# Patient Record
Sex: MALE | Race: BLACK OR AFRICAN AMERICAN | NOT HISPANIC OR LATINO
[De-identification: names, ages, dates, MRNs, and addresses within clinical notes are randomized per-mention and may not be internally consistent; named-entity substitution may affect disease eponyms.]

---

## 2017-06-13 ENCOUNTER — APPOINTMENT (OUTPATIENT)
Dept: VASCULAR SURGERY | Facility: CLINIC | Age: 77
End: 2017-06-13

## 2017-06-13 VITALS
BODY MASS INDEX: 23.35 KG/M2 | WEIGHT: 180 LBS | HEIGHT: 73.5 IN | DIASTOLIC BLOOD PRESSURE: 68 MMHG | SYSTOLIC BLOOD PRESSURE: 106 MMHG | HEART RATE: 77 BPM | TEMPERATURE: 97.7 F

## 2017-06-13 RX ORDER — PNV NO.95/FERROUS FUM/FOLIC AC 28MG-0.8MG
TABLET ORAL
Refills: 0 | Status: ACTIVE | COMMUNITY

## 2017-08-18 ENCOUNTER — APPOINTMENT (OUTPATIENT)
Dept: VASCULAR SURGERY | Facility: CLINIC | Age: 77
End: 2017-08-18
Payer: MEDICARE

## 2017-08-18 VITALS
WEIGHT: 180 LBS | DIASTOLIC BLOOD PRESSURE: 74 MMHG | SYSTOLIC BLOOD PRESSURE: 118 MMHG | BODY MASS INDEX: 23.86 KG/M2 | HEIGHT: 73 IN | TEMPERATURE: 98.3 F | HEART RATE: 80 BPM

## 2017-08-18 PROCEDURE — 99406 BEHAV CHNG SMOKING 3-10 MIN: CPT

## 2017-08-18 PROCEDURE — 99214 OFFICE O/P EST MOD 30 MIN: CPT

## 2018-07-27 ENCOUNTER — APPOINTMENT (OUTPATIENT)
Dept: VASCULAR SURGERY | Facility: CLINIC | Age: 78
End: 2018-07-27
Payer: MEDICARE

## 2018-07-27 VITALS
BODY MASS INDEX: 22.53 KG/M2 | HEART RATE: 87 BPM | HEIGHT: 73 IN | SYSTOLIC BLOOD PRESSURE: 139 MMHG | TEMPERATURE: 97.4 F | DIASTOLIC BLOOD PRESSURE: 75 MMHG | WEIGHT: 170 LBS

## 2018-07-27 DIAGNOSIS — R09.89 OTHER SPECIFIED SYMPTOMS AND SIGNS INVOLVING THE CIRCULATORY AND RESPIRATORY SYSTEMS: ICD-10-CM

## 2018-07-27 PROCEDURE — 93923 UPR/LXTR ART STDY 3+ LVLS: CPT

## 2018-07-27 PROCEDURE — 99406 BEHAV CHNG SMOKING 3-10 MIN: CPT

## 2018-07-27 PROCEDURE — 99214 OFFICE O/P EST MOD 30 MIN: CPT

## 2018-07-27 RX ORDER — COLESEVELAM HYDROCHLORIDE 625 MG/1
625 TABLET, FILM COATED ORAL
Qty: 30 | Refills: 0 | Status: ACTIVE | COMMUNITY
Start: 2017-11-06

## 2019-08-02 ENCOUNTER — APPOINTMENT (OUTPATIENT)
Dept: VASCULAR SURGERY | Facility: CLINIC | Age: 79
End: 2019-08-02

## 2019-10-30 ENCOUNTER — APPOINTMENT (OUTPATIENT)
Dept: SURGERY | Facility: CLINIC | Age: 79
End: 2019-10-30
Payer: MEDICARE

## 2019-10-30 VITALS
OXYGEN SATURATION: 94 % | HEART RATE: 69 BPM | WEIGHT: 164 LBS | DIASTOLIC BLOOD PRESSURE: 84 MMHG | HEIGHT: 72 IN | BODY MASS INDEX: 22.21 KG/M2 | TEMPERATURE: 97.4 F | SYSTOLIC BLOOD PRESSURE: 153 MMHG

## 2019-10-30 DIAGNOSIS — Z87.09 PERSONAL HISTORY OF OTHER DISEASES OF THE RESPIRATORY SYSTEM: ICD-10-CM

## 2019-10-30 DIAGNOSIS — K40.90 UNILATERAL INGUINAL HERNIA, W/OUT OBSTRUCTION OR GANGRENE, NOT SPECIFIED AS RECURRENT: ICD-10-CM

## 2019-10-30 PROCEDURE — 99203 OFFICE O/P NEW LOW 30 MIN: CPT

## 2019-11-21 ENCOUNTER — OUTPATIENT (OUTPATIENT)
Dept: OUTPATIENT SERVICES | Facility: HOSPITAL | Age: 79
LOS: 1 days | Discharge: HOME | End: 2019-11-21
Payer: MEDICARE

## 2019-11-21 VITALS
SYSTOLIC BLOOD PRESSURE: 133 MMHG | WEIGHT: 161.6 LBS | HEIGHT: 72 IN | TEMPERATURE: 98 F | RESPIRATION RATE: 18 BRPM | DIASTOLIC BLOOD PRESSURE: 84 MMHG | OXYGEN SATURATION: 95 % | HEART RATE: 83 BPM

## 2019-11-21 DIAGNOSIS — Z98.890 OTHER SPECIFIED POSTPROCEDURAL STATES: Chronic | ICD-10-CM

## 2019-11-21 DIAGNOSIS — Z98.49 CATARACT EXTRACTION STATUS, UNSPECIFIED EYE: Chronic | ICD-10-CM

## 2019-11-21 DIAGNOSIS — Z29.9 ENCOUNTER FOR PROPHYLACTIC MEASURES, UNSPECIFIED: ICD-10-CM

## 2019-11-21 DIAGNOSIS — Z90.49 ACQUIRED ABSENCE OF OTHER SPECIFIED PARTS OF DIGESTIVE TRACT: Chronic | ICD-10-CM

## 2019-11-21 DIAGNOSIS — Z01.818 ENCOUNTER FOR OTHER PREPROCEDURAL EXAMINATION: ICD-10-CM

## 2019-11-21 DIAGNOSIS — K40.90 UNILATERAL INGUINAL HERNIA, WITHOUT OBSTRUCTION OR GANGRENE, NOT SPECIFIED AS RECURRENT: ICD-10-CM

## 2019-11-21 LAB
ANION GAP SERPL CALC-SCNC: 6 MMOL/L — SIGNIFICANT CHANGE UP (ref 5–17)
APTT BLD: 31.7 SEC — SIGNIFICANT CHANGE UP (ref 27.5–36.3)
BUN SERPL-MCNC: 17 MG/DL — SIGNIFICANT CHANGE UP (ref 7–23)
CALCIUM SERPL-MCNC: 9.7 MG/DL — SIGNIFICANT CHANGE UP (ref 8.5–10.1)
CHLORIDE SERPL-SCNC: 106 MMOL/L — SIGNIFICANT CHANGE UP (ref 96–108)
CO2 SERPL-SCNC: 33 MMOL/L — HIGH (ref 22–31)
CREAT SERPL-MCNC: 1.16 MG/DL — SIGNIFICANT CHANGE UP (ref 0.5–1.3)
GLUCOSE SERPL-MCNC: 70 MG/DL — SIGNIFICANT CHANGE UP (ref 70–99)
HCT VFR BLD CALC: 49.9 % — SIGNIFICANT CHANGE UP (ref 39–50)
HGB BLD-MCNC: 15.4 G/DL — SIGNIFICANT CHANGE UP (ref 13–17)
INR BLD: 1.06 RATIO — SIGNIFICANT CHANGE UP (ref 0.88–1.16)
MCHC RBC-ENTMCNC: 30.6 PG — SIGNIFICANT CHANGE UP (ref 27–34)
MCHC RBC-ENTMCNC: 30.9 GM/DL — LOW (ref 32–36)
MCV RBC AUTO: 99 FL — SIGNIFICANT CHANGE UP (ref 80–100)
NRBC # BLD: 0 /100 WBCS — SIGNIFICANT CHANGE UP (ref 0–0)
PLATELET # BLD AUTO: 215 K/UL — SIGNIFICANT CHANGE UP (ref 150–400)
POTASSIUM SERPL-MCNC: 4.3 MMOL/L — SIGNIFICANT CHANGE UP (ref 3.5–5.3)
POTASSIUM SERPL-SCNC: 4.3 MMOL/L — SIGNIFICANT CHANGE UP (ref 3.5–5.3)
PROTHROM AB SERPL-ACNC: 11.9 SEC — SIGNIFICANT CHANGE UP (ref 10–12.9)
RBC # BLD: 5.04 M/UL — SIGNIFICANT CHANGE UP (ref 4.2–5.8)
RBC # FLD: 15.5 % — HIGH (ref 10.3–14.5)
SODIUM SERPL-SCNC: 145 MMOL/L — SIGNIFICANT CHANGE UP (ref 135–145)
WBC # BLD: 5.35 K/UL — SIGNIFICANT CHANGE UP (ref 3.8–10.5)
WBC # FLD AUTO: 5.35 K/UL — SIGNIFICANT CHANGE UP (ref 3.8–10.5)

## 2019-11-21 PROCEDURE — 93010 ELECTROCARDIOGRAM REPORT: CPT

## 2019-11-21 NOTE — H&P PST ADULT - NSICDXPASTSURGICALHX_GEN_ALL_CORE_FT
PAST SURGICAL HISTORY:  S/P cataract surgery right  eye  9/2019    S/P laparoscopic cholecystectomy     S/P left inguinal hernia repair 1979

## 2019-11-21 NOTE — H&P PST ADULT - NSANTHOSAYNRD_GEN_A_CORE
No. SANTIAGO screening performed.  STOP BANG Legend: 0-2 = LOW Risk; 3-4 = INTERMEDIATE Risk; 5-8 = HIGH Risk

## 2019-11-21 NOTE — H&P PST ADULT - HISTORY OF PRESENT ILLNESS
This is a 79 y/o male c/o right groin swollen, seen PCP for evaluation revealed + right inguinal hernia, he presents today for right inguinal hernia with mesh This is a 79 y/o male c/o right groin swollen, seen PCP for evaluation revealed + right inguinal hernia, he presents today for right inguinal hernia repair  with mesh

## 2019-11-21 NOTE — H&P PST ADULT - NSICDXPROBLEM_GEN_ALL_CORE_FT
PROBLEM DIAGNOSES  Problem: Unilateral inguinal hernia without obstruction or gangrene  Assessment and Plan: right inguinal hernia repair with mesh    Problem: Need for prophylactic measure  Assessment and Plan: The Caprini score indicates this patient is at risk for a VTE event (score 3-5).  Most surgical patients in this group would benefit from pharmacologic prophylaxis.  The surgical team will determine the balance between VTE risk and bleeding risk

## 2019-11-21 NOTE — H&P PST ADULT - NSICDXPASTMEDICALHX_GEN_ALL_CORE_FT
PAST MEDICAL HISTORY:  Emphysema of lung     Hypertension on no med, monitored by MD    Nodule of left lung monitored by MD , had CT and biopsy 7/2019  inconclusived, will have next CT 1/2020 PAST MEDICAL HISTORY:  Emphysema of lung     Hypertension on no med, monitored by MD    Nodule of left lung monitored by MD , had CT and biopsy 7/2019  inconclusive,  will have next CT 1/2020    Vitamin D deficiency

## 2019-11-21 NOTE — H&P PST ADULT - ASSESSMENT
unilateral inguinal hernia , without obstruction or gangrene, not specified as recurrent  CAPRINI VTE 2.0 SCORE [CLOT updated 2019]    AGE RELATED RISK FACTORS                                                       MOBILITY RELATED FACTORS  [ ] Age 41-60 years                                            (1 Point)                    [ ] Bed rest                                                        (1 Point)  [ ] Age: 61-74 years                                           (2 Points)                  [ ] Plaster cast                                                   (2 Points)  [3 ] Age= 75 years                                              (3 Points)                    [ ] Bed bound for more than 72 hours                 (2 Points)    DISEASE RELATED RISK FACTORS                                               GENDER SPECIFIC FACTORS  [ ] Edema in the lower extremities                       (1 Point)              [ ] Pregnancy                                                     (1 Point)  [ ] Varicose veins                                               (1 Point)                     [ ] Post-partum < 6 weeks                                   (1 Point)             [ ] BMI > 25 Kg/m2                                            (1 Point)                     [ ] Hormonal therapy  or oral contraception          (1 Point)                 [ ] Sepsis (in the previous month)                        (1 Point)               [ ] History of pregnancy complications                 (1 point)  [ ] Pneumonia or serious lung disease                                               [ ] Unexplained or recurrent                     (1 Point)           (in the previous month)                               (1 Point)  [ ] Abnormal pulmonary function test                     (1 Point)                 SURGERY RELATED RISK FACTORS  [ ] Acute myocardial infarction                              (1 Point)               [ ]  Section                                             (1 Point)  [ ] Congestive heart failure (in the previous month)  (1 Point)      [ ] Minor surgery                                                  (1 Point)   [ ] Inflammatory bowel disease                             (1 Point)               [ ] Arthroscopic surgery                                        (2 Points)  [ ] Central venous access                                      (2 Points)                [x ] General surgery lasting more than 45 minutes (2 points)  [ ] Malignancy- Present or previous                   (2 Points)                [ ] Elective arthroplasty                                         (5 points)    [ ] Stroke (in the previous month)                          (5 Points)                                                                                                                                                           HEMATOLOGY RELATED FACTORS                                                 TRAUMA RELATED RISK FACTORS  [ ] Prior episodes of VTE                                     (3 Points)                [ ] Fracture of the hip, pelvis, or leg                       (5 Points)  [ ] Positive family history for VTE                         (3 Points)             [ ] Acute spinal cord injury (in the previous month)  (5 Points)  [ ] Prothrombin 20345 A                                     (3 Points)               [ ] Paralysis  (less than 1 month)                             (5 Points)  [ ] Factor V Leiden                                             (3 Points)                  [ ] Multiple Trauma within 1 month                        (5 Points)  [ ] Lupus anticoagulants                                     (3 Points)                                                           [ ] Anticardiolipin antibodies                               (3 Points)                                                       [ ] High homocysteine in the blood                      (3 Points)                                             [ ] Other congenital or acquired thrombophilia      (3 Points)                                                [ ] Heparin induced thrombocytopenia                  (3 Points)                                     Total Score [      5    ]

## 2019-11-27 PROBLEM — E55.9 VITAMIN D DEFICIENCY, UNSPECIFIED: Chronic | Status: ACTIVE | Noted: 2019-11-21

## 2019-12-02 ENCOUNTER — TRANSCRIPTION ENCOUNTER (OUTPATIENT)
Age: 79
End: 2019-12-02

## 2019-12-03 ENCOUNTER — OUTPATIENT (OUTPATIENT)
Dept: OUTPATIENT SERVICES | Facility: HOSPITAL | Age: 79
LOS: 1 days | Discharge: HOME | End: 2019-12-03
Payer: MEDICARE

## 2019-12-03 ENCOUNTER — APPOINTMENT (OUTPATIENT)
Dept: SURGERY | Facility: HOSPITAL | Age: 79
End: 2019-12-03

## 2019-12-03 VITALS
DIASTOLIC BLOOD PRESSURE: 87 MMHG | HEIGHT: 72 IN | HEART RATE: 63 BPM | SYSTOLIC BLOOD PRESSURE: 147 MMHG | RESPIRATION RATE: 18 BRPM | OXYGEN SATURATION: 98 % | WEIGHT: 162.04 LBS | TEMPERATURE: 97 F

## 2019-12-03 VITALS
OXYGEN SATURATION: 96 % | TEMPERATURE: 97 F | RESPIRATION RATE: 918 BRPM | HEART RATE: 61 BPM | DIASTOLIC BLOOD PRESSURE: 73 MMHG | SYSTOLIC BLOOD PRESSURE: 163 MMHG

## 2019-12-03 DIAGNOSIS — Z98.49 CATARACT EXTRACTION STATUS, UNSPECIFIED EYE: Chronic | ICD-10-CM

## 2019-12-03 DIAGNOSIS — Z98.890 OTHER SPECIFIED POSTPROCEDURAL STATES: Chronic | ICD-10-CM

## 2019-12-03 DIAGNOSIS — Z90.49 ACQUIRED ABSENCE OF OTHER SPECIFIED PARTS OF DIGESTIVE TRACT: Chronic | ICD-10-CM

## 2019-12-03 PROCEDURE — 49505 PRP I/HERN INIT REDUC >5 YR: CPT

## 2019-12-03 PROCEDURE — 49505 PRP I/HERN INIT REDUC >5 YR: CPT | Mod: AS

## 2019-12-03 RX ORDER — HYDROMORPHONE HYDROCHLORIDE 2 MG/ML
0.25 INJECTION INTRAMUSCULAR; INTRAVENOUS; SUBCUTANEOUS
Refills: 0 | Status: DISCONTINUED | OUTPATIENT
Start: 2019-12-03 | End: 2019-12-03

## 2019-12-03 RX ORDER — ONDANSETRON 8 MG/1
4 TABLET, FILM COATED ORAL ONCE
Refills: 0 | Status: DISCONTINUED | OUTPATIENT
Start: 2019-12-03 | End: 2019-12-03

## 2019-12-03 RX ORDER — SODIUM CHLORIDE 9 MG/ML
1000 INJECTION, SOLUTION INTRAVENOUS
Refills: 0 | Status: DISCONTINUED | OUTPATIENT
Start: 2019-12-03 | End: 2019-12-03

## 2019-12-03 RX ORDER — ACETAMINOPHEN 500 MG
1000 TABLET ORAL ONCE
Refills: 0 | Status: COMPLETED | OUTPATIENT
Start: 2019-12-03 | End: 2019-12-03

## 2019-12-03 RX ORDER — HYDROMORPHONE HYDROCHLORIDE 2 MG/ML
0.5 INJECTION INTRAMUSCULAR; INTRAVENOUS; SUBCUTANEOUS
Refills: 0 | Status: DISCONTINUED | OUTPATIENT
Start: 2019-12-03 | End: 2019-12-03

## 2019-12-03 RX ADMIN — Medication 1000 MILLIGRAM(S): at 14:33

## 2019-12-03 RX ADMIN — Medication 400 MILLIGRAM(S): at 14:33

## 2019-12-03 RX ADMIN — SODIUM CHLORIDE 100 MILLILITER(S): 9 INJECTION, SOLUTION INTRAVENOUS at 14:33

## 2019-12-03 NOTE — ASU PATIENT PROFILE, ADULT - PSH
S/P cataract surgery  right  eye  9/2019  S/P laparoscopic cholecystectomy    S/P left inguinal hernia repair  1979

## 2019-12-03 NOTE — ASU PATIENT PROFILE, ADULT - PMH
Emphysema of lung    Hypertension  on no med, monitored by MD  Nodule of left lung  monitored by MD , had CT and biopsy 7/2019  inconclusive,  will have next CT 1/2020  Vitamin D deficiency

## 2019-12-03 NOTE — ASU DISCHARGE PLAN (ADULT/PEDIATRIC) - CALL YOUR DOCTOR IF YOU HAVE ANY OF THE FOLLOWING:
Wound/Surgical Site with redness, or foul smelling discharge or pus/Fever greater than (need to indicate Fahrenheit or Celsius)/Bleeding that does not stop/Nausea and vomiting that does not stop/Swelling that gets worse/Unable to urinate

## 2019-12-05 DIAGNOSIS — D17.6 BENIGN LIPOMATOUS NEOPLASM OF SPERMATIC CORD: ICD-10-CM

## 2019-12-05 DIAGNOSIS — Z72.0 TOBACCO USE: ICD-10-CM

## 2019-12-05 DIAGNOSIS — K40.90 UNILATERAL INGUINAL HERNIA, WITHOUT OBSTRUCTION OR GANGRENE, NOT SPECIFIED AS RECURRENT: ICD-10-CM

## 2019-12-05 DIAGNOSIS — J43.9 EMPHYSEMA, UNSPECIFIED: ICD-10-CM

## 2019-12-05 DIAGNOSIS — Z88.0 ALLERGY STATUS TO PENICILLIN: ICD-10-CM

## 2019-12-11 ENCOUNTER — APPOINTMENT (OUTPATIENT)
Dept: SURGERY | Facility: CLINIC | Age: 79
End: 2019-12-11
Payer: MEDICARE

## 2019-12-11 PROCEDURE — 99024 POSTOP FOLLOW-UP VISIT: CPT

## 2020-01-08 ENCOUNTER — APPOINTMENT (OUTPATIENT)
Dept: SURGERY | Facility: CLINIC | Age: 80
End: 2020-01-08
Payer: MEDICARE

## 2020-01-08 VITALS
HEIGHT: 72 IN | HEART RATE: 77 BPM | DIASTOLIC BLOOD PRESSURE: 70 MMHG | SYSTOLIC BLOOD PRESSURE: 137 MMHG | TEMPERATURE: 97.4 F | OXYGEN SATURATION: 90 % | BODY MASS INDEX: 22.25 KG/M2 | WEIGHT: 164.25 LBS

## 2020-01-08 DIAGNOSIS — Z09 ENCOUNTER FOR FOLLOW-UP EXAMINATION AFTER COMPLETED TREATMENT FOR CONDITIONS OTHER THAN MALIGNANT NEOPLASM: ICD-10-CM

## 2020-01-08 PROCEDURE — 99024 POSTOP FOLLOW-UP VISIT: CPT | Mod: PD

## 2020-01-10 ENCOUNTER — INPATIENT (INPATIENT)
Facility: HOSPITAL | Age: 80
LOS: 4 days | Discharge: HOME | End: 2020-01-15
Attending: INTERNAL MEDICINE | Admitting: INTERNAL MEDICINE
Payer: MEDICARE

## 2020-01-10 VITALS
DIASTOLIC BLOOD PRESSURE: 75 MMHG | HEART RATE: 93 BPM | SYSTOLIC BLOOD PRESSURE: 142 MMHG | TEMPERATURE: 99 F | RESPIRATION RATE: 16 BRPM | OXYGEN SATURATION: 91 %

## 2020-01-10 DIAGNOSIS — J93.9 PNEUMOTHORAX, UNSPECIFIED: ICD-10-CM

## 2020-01-10 DIAGNOSIS — Z98.49 CATARACT EXTRACTION STATUS, UNSPECIFIED EYE: Chronic | ICD-10-CM

## 2020-01-10 DIAGNOSIS — Z90.49 ACQUIRED ABSENCE OF OTHER SPECIFIED PARTS OF DIGESTIVE TRACT: Chronic | ICD-10-CM

## 2020-01-10 DIAGNOSIS — Z98.890 OTHER SPECIFIED POSTPROCEDURAL STATES: Chronic | ICD-10-CM

## 2020-01-10 LAB
ALBUMIN SERPL ELPH-MCNC: 4.4 G/DL — SIGNIFICANT CHANGE UP (ref 3.3–5)
ALP SERPL-CCNC: 62 U/L — SIGNIFICANT CHANGE UP (ref 40–120)
ALT FLD-CCNC: 9 U/L — SIGNIFICANT CHANGE UP (ref 4–41)
ANION GAP SERPL CALC-SCNC: 15 MMO/L — HIGH (ref 7–14)
APTT BLD: 35.9 SEC — SIGNIFICANT CHANGE UP (ref 27.5–36.3)
AST SERPL-CCNC: 21 U/L — SIGNIFICANT CHANGE UP (ref 4–40)
BASOPHILS # BLD AUTO: 0.03 K/UL — SIGNIFICANT CHANGE UP (ref 0–0.2)
BASOPHILS NFR BLD AUTO: 0.5 % — SIGNIFICANT CHANGE UP (ref 0–2)
BILIRUB SERPL-MCNC: 0.6 MG/DL — SIGNIFICANT CHANGE UP (ref 0.2–1.2)
BUN SERPL-MCNC: 21 MG/DL — SIGNIFICANT CHANGE UP (ref 7–23)
CALCIUM SERPL-MCNC: 9.8 MG/DL — SIGNIFICANT CHANGE UP (ref 8.4–10.5)
CHLORIDE SERPL-SCNC: 103 MMOL/L — SIGNIFICANT CHANGE UP (ref 98–107)
CO2 SERPL-SCNC: 27 MMOL/L — SIGNIFICANT CHANGE UP (ref 22–31)
CREAT SERPL-MCNC: 1.08 MG/DL — SIGNIFICANT CHANGE UP (ref 0.5–1.3)
EOSINOPHIL # BLD AUTO: 0.08 K/UL — SIGNIFICANT CHANGE UP (ref 0–0.5)
EOSINOPHIL NFR BLD AUTO: 1.3 % — SIGNIFICANT CHANGE UP (ref 0–6)
GLUCOSE SERPL-MCNC: 90 MG/DL — SIGNIFICANT CHANGE UP (ref 70–99)
HCT VFR BLD CALC: 48.7 % — SIGNIFICANT CHANGE UP (ref 39–50)
HGB BLD-MCNC: 14.7 G/DL — SIGNIFICANT CHANGE UP (ref 13–17)
IMM GRANULOCYTES NFR BLD AUTO: 0.2 % — SIGNIFICANT CHANGE UP (ref 0–1.5)
INR BLD: 1.11 — SIGNIFICANT CHANGE UP (ref 0.88–1.17)
LYMPHOCYTES # BLD AUTO: 1.55 K/UL — SIGNIFICANT CHANGE UP (ref 1–3.3)
LYMPHOCYTES # BLD AUTO: 25.9 % — SIGNIFICANT CHANGE UP (ref 13–44)
MAGNESIUM SERPL-MCNC: 2.2 MG/DL — SIGNIFICANT CHANGE UP (ref 1.6–2.6)
MCHC RBC-ENTMCNC: 30.2 % — LOW (ref 32–36)
MCHC RBC-ENTMCNC: 30.6 PG — SIGNIFICANT CHANGE UP (ref 27–34)
MCV RBC AUTO: 101.5 FL — HIGH (ref 80–100)
MONOCYTES # BLD AUTO: 0.83 K/UL — SIGNIFICANT CHANGE UP (ref 0–0.9)
MONOCYTES NFR BLD AUTO: 13.9 % — SIGNIFICANT CHANGE UP (ref 2–14)
NEUTROPHILS # BLD AUTO: 3.49 K/UL — SIGNIFICANT CHANGE UP (ref 1.8–7.4)
NEUTROPHILS NFR BLD AUTO: 58.2 % — SIGNIFICANT CHANGE UP (ref 43–77)
NRBC # FLD: 0 K/UL — SIGNIFICANT CHANGE UP (ref 0–0)
PHOSPHATE SERPL-MCNC: 3 MG/DL — SIGNIFICANT CHANGE UP (ref 2.5–4.5)
PLATELET # BLD AUTO: 199 K/UL — SIGNIFICANT CHANGE UP (ref 150–400)
PMV BLD: 11.6 FL — SIGNIFICANT CHANGE UP (ref 7–13)
POTASSIUM SERPL-MCNC: 4.3 MMOL/L — SIGNIFICANT CHANGE UP (ref 3.5–5.3)
POTASSIUM SERPL-SCNC: 4.3 MMOL/L — SIGNIFICANT CHANGE UP (ref 3.5–5.3)
PROT SERPL-MCNC: 8.7 G/DL — HIGH (ref 6–8.3)
PROTHROM AB SERPL-ACNC: 12.7 SEC — SIGNIFICANT CHANGE UP (ref 9.8–13.1)
RBC # BLD: 4.8 M/UL — SIGNIFICANT CHANGE UP (ref 4.2–5.8)
RBC # FLD: 16 % — HIGH (ref 10.3–14.5)
SODIUM SERPL-SCNC: 145 MMOL/L — SIGNIFICANT CHANGE UP (ref 135–145)
WBC # BLD: 5.99 K/UL — SIGNIFICANT CHANGE UP (ref 3.8–10.5)
WBC # FLD AUTO: 5.99 K/UL — SIGNIFICANT CHANGE UP (ref 3.8–10.5)

## 2020-01-10 PROCEDURE — 71046 X-RAY EXAM CHEST 2 VIEWS: CPT | Mod: 26

## 2020-01-10 RX ORDER — INFLUENZA VIRUS VACCINE 15; 15; 15; 15 UG/.5ML; UG/.5ML; UG/.5ML; UG/.5ML
0.5 SUSPENSION INTRAMUSCULAR ONCE
Refills: 0 | Status: DISCONTINUED | OUTPATIENT
Start: 2020-01-10 | End: 2020-01-11

## 2020-01-10 NOTE — ED ADULT NURSE NOTE - CHIEF COMPLAINT QUOTE
A&OX3 c/o for known left sided pneumothorax, pt had a ct scan yesterday as a follow up for emphysema, reports sob, respirations equal and non labored sating 99% denies n/v cp chills

## 2020-01-10 NOTE — ED PROVIDER NOTE - OBJECTIVE STATEMENT
78yo m pmh COPD, HTN, left lung nodule p/w pneumothorax on outpt CT. PT reports some chronic shortness of breath which is slightly worse than usual. 78yo m pmh COPD, HTN, left lung nodule p/w pneumothorax on outpt CT. PT reports some chronic shortness of breath which is slightly worse than usual. Pt w/ chonic cough. no fevers or chills. Pt follows w/ Ash and was getting CT as part of lung nodule screening

## 2020-01-10 NOTE — ED PROVIDER NOTE - CLINICAL SUMMARY MEDICAL DECISION MAKING FREE TEXT BOX
pt with pneumo CT , stable vitals, saturating 94 on RA, will consult pulm for pigtail cath, reassess.

## 2020-01-10 NOTE — ED ADULT NURSE NOTE - OBJECTIVE STATEMENT
received pt in bed A and OX 3 in NAD resting comfortably, breathing is even and unlabored, pt communicates with full and complete sentences, lung sounds clear B/l but diminished in left lower lobes.  pt is on monitor with NSR noted VS as documented, O2 NC at 2 LPM tolerating well. IV initiated labs sent, reports given to the floor for further care.

## 2020-01-10 NOTE — ED PROVIDER NOTE - NS ED ROS FT
Gen: No fever, normal appetite  Eyes: No eye irritation or discharge  ENT: No ear pain, congestion, sore throat  Resp: cough, SOB, known pneumo   Cardiovascular: No chest pain or palpitation  Gastroenteric: No nausea/vomiting, diarrhea, constipation  :  No change in urine output; no dysuria  MS: No joint or muscle pain  Skin: No rashes  Neuro: No headache; no abnormal movements  Remainder negative, except as per the HPI

## 2020-01-10 NOTE — ED PROVIDER NOTE - PHYSICAL EXAMINATION
GEN - NAD; well appearing; A+O x3   HEAD - NC/AT     EYES - EOMI, no conjunctival pallor, no scleral icterus  ENT -   mucous membranes  moist , no discharge      NECK - Neck supple  PULM - absent breath sounds on right upper lobe.   COR -  RRR, S1 S2, no murmurs  ABD - , ND, NT, soft, no guarding, no rebound, no masses    BACK - no CVA tenderness, nontender spine     EXTREMS - no edema, no deformity, warm and well perfused    SKIN - no rash or bruising      NEUROLOGIC - alert, sensation nl, motor 5/5 RUE/LUE/RLE/LLE

## 2020-01-10 NOTE — ED ADULT NURSE NOTE - NSIMPLEMENTINTERV_GEN_ALL_ED
Implemented All Fall with Harm Risk Interventions:  [place] to call system. Call bell, personal items and telephone within reach. Instruct patient to call for assistance. Room bathroom lighting operational. Non-slip footwear when patient is off stretcher. Physically safe environment: no spills, clutter or unnecessary equipment. Stretcher in lowest position, wheels locked, appropriate side rails in place. Provide visual cue, wrist band, yellow gown, etc. Monitor gait and stability. Monitor for mental status changes and reorient to person, place, and time. Review medications for side effects contributing to fall risk. Reinforce activity limits and safety measures with patient and family. Provide visual clues: red socks.

## 2020-01-10 NOTE — ED ADULT TRIAGE NOTE - CHIEF COMPLAINT QUOTE
A&OX3 c/o for known left sided pneumothorax, pt had a ct scan yesterday as a follow up for emphysema, reports sob, denies n/v cp chills A&OX3 c/o for known left sided pneumothorax, pt had a ct scan yesterday as a follow up for emphysema, reports sob, respirations equal and non labored sating 99% denies n/v cp chills

## 2020-01-11 DIAGNOSIS — J43.9 EMPHYSEMA, UNSPECIFIED: ICD-10-CM

## 2020-01-11 DIAGNOSIS — Z00.00 ENCOUNTER FOR GENERAL ADULT MEDICAL EXAMINATION WITHOUT ABNORMAL FINDINGS: ICD-10-CM

## 2020-01-11 DIAGNOSIS — E53.8 DEFICIENCY OF OTHER SPECIFIED B GROUP VITAMINS: ICD-10-CM

## 2020-01-11 DIAGNOSIS — Z02.9 ENCOUNTER FOR ADMINISTRATIVE EXAMINATIONS, UNSPECIFIED: ICD-10-CM

## 2020-01-11 DIAGNOSIS — I10 ESSENTIAL (PRIMARY) HYPERTENSION: ICD-10-CM

## 2020-01-11 DIAGNOSIS — E13.9 OTHER SPECIFIED DIABETES MELLITUS WITHOUT COMPLICATIONS: ICD-10-CM

## 2020-01-11 DIAGNOSIS — E55.9 VITAMIN D DEFICIENCY, UNSPECIFIED: ICD-10-CM

## 2020-01-11 DIAGNOSIS — R91.1 SOLITARY PULMONARY NODULE: ICD-10-CM

## 2020-01-11 DIAGNOSIS — J93.9 PNEUMOTHORAX, UNSPECIFIED: ICD-10-CM

## 2020-01-11 DIAGNOSIS — N32.89 OTHER SPECIFIED DISORDERS OF BLADDER: ICD-10-CM

## 2020-01-11 DIAGNOSIS — Z29.9 ENCOUNTER FOR PROPHYLACTIC MEASURES, UNSPECIFIED: ICD-10-CM

## 2020-01-11 DIAGNOSIS — F17.200 NICOTINE DEPENDENCE, UNSPECIFIED, UNCOMPLICATED: ICD-10-CM

## 2020-01-11 LAB
ANION GAP SERPL CALC-SCNC: 11 MMO/L — SIGNIFICANT CHANGE UP (ref 7–14)
BASOPHILS # BLD AUTO: 0.03 K/UL — SIGNIFICANT CHANGE UP (ref 0–0.2)
BASOPHILS NFR BLD AUTO: 0.6 % — SIGNIFICANT CHANGE UP (ref 0–2)
BUN SERPL-MCNC: 16 MG/DL — SIGNIFICANT CHANGE UP (ref 7–23)
CALCIUM SERPL-MCNC: 8.8 MG/DL — SIGNIFICANT CHANGE UP (ref 8.4–10.5)
CHLORIDE SERPL-SCNC: 105 MMOL/L — SIGNIFICANT CHANGE UP (ref 98–107)
CO2 SERPL-SCNC: 24 MMOL/L — SIGNIFICANT CHANGE UP (ref 22–31)
CREAT SERPL-MCNC: 0.84 MG/DL — SIGNIFICANT CHANGE UP (ref 0.5–1.3)
EOSINOPHIL # BLD AUTO: 0.15 K/UL — SIGNIFICANT CHANGE UP (ref 0–0.5)
EOSINOPHIL NFR BLD AUTO: 2.9 % — SIGNIFICANT CHANGE UP (ref 0–6)
GLUCOSE BLDC GLUCOMTR-MCNC: 122 MG/DL — HIGH (ref 70–99)
GLUCOSE BLDC GLUCOMTR-MCNC: 82 MG/DL — SIGNIFICANT CHANGE UP (ref 70–99)
GLUCOSE SERPL-MCNC: 66 MG/DL — LOW (ref 70–99)
HCT VFR BLD CALC: 45.7 % — SIGNIFICANT CHANGE UP (ref 39–50)
HGB BLD-MCNC: 14.1 G/DL — SIGNIFICANT CHANGE UP (ref 13–17)
IMM GRANULOCYTES NFR BLD AUTO: 0.2 % — SIGNIFICANT CHANGE UP (ref 0–1.5)
LYMPHOCYTES # BLD AUTO: 1.82 K/UL — SIGNIFICANT CHANGE UP (ref 1–3.3)
LYMPHOCYTES # BLD AUTO: 35 % — SIGNIFICANT CHANGE UP (ref 13–44)
MAGNESIUM SERPL-MCNC: 2 MG/DL — SIGNIFICANT CHANGE UP (ref 1.6–2.6)
MCHC RBC-ENTMCNC: 30.9 % — LOW (ref 32–36)
MCHC RBC-ENTMCNC: 31.1 PG — SIGNIFICANT CHANGE UP (ref 27–34)
MCV RBC AUTO: 100.9 FL — HIGH (ref 80–100)
MONOCYTES # BLD AUTO: 0.71 K/UL — SIGNIFICANT CHANGE UP (ref 0–0.9)
MONOCYTES NFR BLD AUTO: 13.7 % — SIGNIFICANT CHANGE UP (ref 2–14)
NEUTROPHILS # BLD AUTO: 2.48 K/UL — SIGNIFICANT CHANGE UP (ref 1.8–7.4)
NEUTROPHILS NFR BLD AUTO: 47.6 % — SIGNIFICANT CHANGE UP (ref 43–77)
NRBC # FLD: 0 K/UL — SIGNIFICANT CHANGE UP (ref 0–0)
PLATELET # BLD AUTO: 162 K/UL — SIGNIFICANT CHANGE UP (ref 150–400)
PMV BLD: 12.5 FL — SIGNIFICANT CHANGE UP (ref 7–13)
POTASSIUM SERPL-MCNC: 3.7 MMOL/L — SIGNIFICANT CHANGE UP (ref 3.5–5.3)
POTASSIUM SERPL-SCNC: 3.7 MMOL/L — SIGNIFICANT CHANGE UP (ref 3.5–5.3)
RBC # BLD: 4.53 M/UL — SIGNIFICANT CHANGE UP (ref 4.2–5.8)
RBC # FLD: 15.7 % — HIGH (ref 10.3–14.5)
SODIUM SERPL-SCNC: 140 MMOL/L — SIGNIFICANT CHANGE UP (ref 135–145)
VIT B12 SERPL-MCNC: 465 PG/ML — SIGNIFICANT CHANGE UP (ref 200–900)
WBC # BLD: 5.2 K/UL — SIGNIFICANT CHANGE UP (ref 3.8–10.5)
WBC # FLD AUTO: 5.2 K/UL — SIGNIFICANT CHANGE UP (ref 3.8–10.5)

## 2020-01-11 PROCEDURE — 99223 1ST HOSP IP/OBS HIGH 75: CPT

## 2020-01-11 PROCEDURE — 71045 X-RAY EXAM CHEST 1 VIEW: CPT | Mod: 26

## 2020-01-11 PROCEDURE — 32551 INSERTION OF CHEST TUBE: CPT | Mod: GC

## 2020-01-11 PROCEDURE — 99233 SBSQ HOSP IP/OBS HIGH 50: CPT | Mod: GC,25

## 2020-01-11 PROCEDURE — 71260 CT THORAX DX C+: CPT | Mod: 26

## 2020-01-11 PROCEDURE — 74177 CT ABD & PELVIS W/CONTRAST: CPT | Mod: 26

## 2020-01-11 PROCEDURE — 93010 ELECTROCARDIOGRAM REPORT: CPT

## 2020-01-11 RX ORDER — CHOLECALCIFEROL (VITAMIN D3) 125 MCG
2000 CAPSULE ORAL DAILY
Refills: 0 | Status: DISCONTINUED | OUTPATIENT
Start: 2020-01-11 | End: 2020-01-15

## 2020-01-11 RX ORDER — LIDOCAINE HCL 20 MG/ML
30 VIAL (ML) INJECTION ONCE
Refills: 0 | Status: DISCONTINUED | OUTPATIENT
Start: 2020-01-11 | End: 2020-01-15

## 2020-01-11 RX ORDER — SODIUM CHLORIDE 9 MG/ML
1000 INJECTION INTRAMUSCULAR; INTRAVENOUS; SUBCUTANEOUS
Refills: 0 | Status: DISCONTINUED | OUTPATIENT
Start: 2020-01-11 | End: 2020-01-11

## 2020-01-11 RX ORDER — NICOTINE POLACRILEX 2 MG
1 GUM BUCCAL DAILY
Refills: 0 | Status: DISCONTINUED | OUTPATIENT
Start: 2020-01-11 | End: 2020-01-15

## 2020-01-11 RX ORDER — AMLODIPINE BESYLATE 2.5 MG/1
5 TABLET ORAL DAILY
Refills: 0 | Status: DISCONTINUED | OUTPATIENT
Start: 2020-01-11 | End: 2020-01-15

## 2020-01-11 RX ORDER — PREGABALIN 225 MG/1
1000 CAPSULE ORAL DAILY
Refills: 0 | Status: DISCONTINUED | OUTPATIENT
Start: 2020-01-11 | End: 2020-01-15

## 2020-01-11 RX ORDER — ENOXAPARIN SODIUM 100 MG/ML
40 INJECTION SUBCUTANEOUS DAILY
Refills: 0 | Status: DISCONTINUED | OUTPATIENT
Start: 2020-01-11 | End: 2020-01-15

## 2020-01-11 RX ORDER — BUDESONIDE AND FORMOTEROL FUMARATE DIHYDRATE 160; 4.5 UG/1; UG/1
2 AEROSOL RESPIRATORY (INHALATION)
Refills: 0 | Status: DISCONTINUED | OUTPATIENT
Start: 2020-01-11 | End: 2020-01-15

## 2020-01-11 RX ORDER — IPRATROPIUM/ALBUTEROL SULFATE 18-103MCG
3 AEROSOL WITH ADAPTER (GRAM) INHALATION EVERY 6 HOURS
Refills: 0 | Status: DISCONTINUED | OUTPATIENT
Start: 2020-01-11 | End: 2020-01-14

## 2020-01-11 RX ORDER — INFLUENZA VIRUS VACCINE 15; 15; 15; 15 UG/.5ML; UG/.5ML; UG/.5ML; UG/.5ML
0.5 SUSPENSION INTRAMUSCULAR ONCE
Refills: 0 | Status: COMPLETED | OUTPATIENT
Start: 2020-01-11 | End: 2020-01-11

## 2020-01-11 RX ADMIN — SODIUM CHLORIDE 100 MILLILITER(S): 9 INJECTION INTRAMUSCULAR; INTRAVENOUS; SUBCUTANEOUS at 01:04

## 2020-01-11 RX ADMIN — Medication 3 MILLILITER(S): at 23:03

## 2020-01-11 RX ADMIN — AMLODIPINE BESYLATE 5 MILLIGRAM(S): 2.5 TABLET ORAL at 05:54

## 2020-01-11 RX ADMIN — Medication 3 MILLILITER(S): at 16:13

## 2020-01-11 RX ADMIN — Medication 3 MILLILITER(S): at 10:18

## 2020-01-11 RX ADMIN — Medication 2000 UNIT(S): at 14:59

## 2020-01-11 RX ADMIN — Medication 1 PATCH: at 22:19

## 2020-01-11 RX ADMIN — PREGABALIN 1000 MICROGRAM(S): 225 CAPSULE ORAL at 14:59

## 2020-01-11 RX ADMIN — BUDESONIDE AND FORMOTEROL FUMARATE DIHYDRATE 2 PUFF(S): 160; 4.5 AEROSOL RESPIRATORY (INHALATION) at 23:03

## 2020-01-11 RX ADMIN — Medication 3 MILLILITER(S): at 03:22

## 2020-01-11 NOTE — H&P ADULT - NSHPLABSRESULTS_GEN_ALL_CORE
LABS:                        14.7   5.99  )-----------( 199      ( 10 Robert 2020 19:56 )             48.7     01-10    145  |  103  |  21  ----------------------------<  90  4.3   |  27  |  1.08    Ca    9.8      10 Robert 2020 19:56  Phos  3.0     01-10  Mg     2.2     01-10    TPro  8.7<H>  /  Alb  4.4  /  TBili  0.6  /  DBili  x   /  AST  21  /  ALT  9   /  AlkPhos  62  01-10      PT/INR - ( 10 Robert 2020 19:56 )   PT: 12.7 SEC;   INR: 1.11          PTT - ( 10 Robert 2020 19:56 )  PTT:35.9 SEC      EKG:    RADIOLOGY & ADDITIONAL TESTS:  < from: Xray Chest 2 Views PA/Lat (01.10.20 @ 19:43) >    INTERPRETATION:  Multiple lung masses. small right pneumothorax.

## 2020-01-11 NOTE — H&P ADULT - PROBLEM SELECTOR PLAN 1
sating 91% on RA, supplemental O2 2L, titrate to O2 sat>92%  cont to monitor O2 sats and lung exam  currently hemodynamically stable  will repeat CT chest w/ contrast   Pulmonary consult in AM (please call)  if desaturates or develops worsening hypoxia/respiratory distress or tension pneumothorax, then consult CT surgery for chest tube

## 2020-01-11 NOTE — H&P ADULT - NSHPSOCIALHISTORY_GEN_ALL_CORE
Substance abuse: denies  Tobacco: current smoker, 0.2 ppd cigarettes x50 years, not motivated to quit  Alcohol use: 1-2 drinks wine/liquor (vodka) monthly

## 2020-01-11 NOTE — H&P ADULT - PROBLEM SELECTOR PLAN 9
Transition of Care Status:  1. Name of PCP Dr. Michelet Chu [phone]  2. PCP contacted on Admission: [ ] Y    [x ] N office closed  3. PCP contacted at Discharge: [ ] Y      [ ] N     [ ] N/A  4. Post-Discharge Appointment date and location:   5. Summary of Handoff Given to PCP:

## 2020-01-11 NOTE — H&P ADULT - HISTORY OF PRESENT ILLNESS
Patient is a 79 year old male with h/o COPD, HTN, DM-2, hld, left lung nodule s/p CT and lung biopsy 7/2019 (non-diagnostic/inconclusive per pt d/t "not enough tissue"), who had surveillance CT chest on Wed (1/9), and was found incidentally to have pneumothorax. Patient states that he was advised to come to the hospital due to CT findings. He noticed increased dyspnea from baseline. He was taking out his garbage this morning and felt SOB, typically can walk two blocks before getting SOB. In the ED, pt was sating 91% on RA, hemodynamically stable.   Pt has chronic cough, but denies chest pain/fever/chills/purulent sputum.  He follows with pulmonary Dr. Ramírez Aleman and was getting CT as part of lung nodule screening.

## 2020-01-11 NOTE — PROCEDURE NOTE - NSPROCDETAILS_GEN_ALL_CORE
Seldinger technique/secured in place/sterile dressing applied/dressing applied/supine position/thoracostomy tube placed percutaneously

## 2020-01-11 NOTE — H&P ADULT - PROBLEM SELECTOR PLAN 2
h/o left lung nodule, s/p biopsy 7/2019, nondiagnostic/inconclusive per pt d/t not enough tissue, f/u Dr. Aleman (pulmonologist)  CXR  (1/10) showed multiple lungmasses. small right pneumothorax.  Pt is a long time smoker, concern for malignancy, will get CT chest and try to get outpt CT chest film/reading (?[facility]Rad[facility] [phone])  Pulmonary consult in AM

## 2020-01-11 NOTE — PROGRESS NOTE ADULT - SUBJECTIVE AND OBJECTIVE BOX
CHIEF COMPLAINT:    SUBJECTIVE:     [ ] All other systems negative  [ ] Unable to assess ROS because ________    OBJECTIVE:  ICU Vital Signs Last 24 Hrs  T(C): 36.8 (11 Jan 2020 05:49), Max: 37 (10 Robert 2020 18:02)  T(F): 98.3 (11 Jan 2020 05:49), Max: 98.6 (10 Robert 2020 18:02)  HR: 71 (11 Jan 2020 05:49) (65 - 93)  BP: 133/77 (11 Jan 2020 05:49) (133/77 - 166/79)  BP(mean): --  ABP: --  ABP(mean): --  RR: 16 (11 Jan 2020 05:49) (16 - 18)  SpO2: 93% (11 Jan 2020 05:49) (91% - 96%)        01-10 @ 07:01  -  01-11 @ 07:00  --------------------------------------------------------  IN: 700 mL / OUT: 300 mL / NET: 400 mL      CAPILLARY BLOOD GLUCOSE          PHYSICAL EXAM:  General:   HEENT:   Lymph Nodes:  Neck:   Respiratory:   Cardiovascular:   Abdomen:   Extremities:   Skin:   Neurological:  Psychiatry:    HOSPITAL MEDICATIONS:  MEDICATIONS  (STANDING):  albuterol/ipratropium for Nebulization 3 milliLiter(s) Nebulizer every 6 hours  amLODIPine   Tablet 5 milliGRAM(s) Oral daily  cholecalciferol 2000 Unit(s) Oral daily  cyanocobalamin 1000 MICROGram(s) Oral daily  enoxaparin Injectable 40 milliGRAM(s) SubCutaneous daily  influenza   Vaccine 0.5 milliLiter(s) IntraMuscular once    MEDICATIONS  (PRN):      LABS:                        14.7   5.99  )-----------( 199      ( 10 Robert 2020 19:56 )             48.7     01-11    140  |  105  |  16  ----------------------------<  66<L>  3.7   |  24  |  0.84    Ca    8.8      11 Jan 2020 06:45  Phos  3.0     01-10  Mg     2.0     01-11    TPro  8.7<H>  /  Alb  4.4  /  TBili  0.6  /  DBili  x   /  AST  21  /  ALT  9   /  AlkPhos  62  01-10    PT/INR - ( 10 Robert 2020 19:56 )   PT: 12.7 SEC;   INR: 1.11          PTT - ( 10 Robert 2020 19:56 )  PTT:35.9 SEC          MICROBIOLOGY:     RADIOLOGY:  [ ] Reviewed and interpreted by me    PULMONARY FUNCTION TESTS:    EKG: CHIEF COMPLAINT: Patient is a 79y old  Male who presents with a chief complaint of SOB f/w PTX (11 Jan 2020 08:04)    SUBJECTIVE:   No interval events overnight. Patient notes mild SOB, otherwise denies fever, chills, chest pain, palpitation, wheezing, dyspnea, abdominal pain, N/V/D/C.   [ x] All other systems negative    OBJECTIVE:  ICU Vital Signs Last 24 Hrs  T(C): 36.8 (11 Jan 2020 05:49), Max: 37 (10 Robert 2020 18:02)  T(F): 98.3 (11 Jan 2020 05:49), Max: 98.6 (10 Robert 2020 18:02)  HR: 71 (11 Jan 2020 05:49) (65 - 93)  BP: 133/77 (11 Jan 2020 05:49) (133/77 - 166/79)  BP(mean): --  ABP: --  ABP(mean): --  RR: 16 (11 Jan 2020 05:49) (16 - 18)  SpO2: 93% (11 Jan 2020 05:49) (91% - 96%)    01-10 @ 07:01  -  01-11 @ 07:00  --------------------------------------------------------  IN: 700 mL / OUT: 300 mL / NET: 400 mL    CAPILLARY BLOOD GLUCOSE    PHYSICAL EXAM:  General: NAD   Cards: S1/S2, no murmurs   Pulm: Diminished right sided lung sounds. CTA left sided lung sounds.   Abdomen: Soft, NTND. BS (+)   Extremities: No pedal edema. AV of BL upper and lower extremities.  Neurology: AOx3, CN 2-12 intact. Sensation intact.     HOSPITAL MEDICATIONS:  MEDICATIONS  (STANDING):  albuterol/ipratropium for Nebulization 3 milliLiter(s) Nebulizer every 6 hours  amLODIPine   Tablet 5 milliGRAM(s) Oral daily  cholecalciferol 2000 Unit(s) Oral daily  cyanocobalamin 1000 MICROGram(s) Oral daily  enoxaparin Injectable 40 milliGRAM(s) SubCutaneous daily  influenza   Vaccine 0.5 milliLiter(s) IntraMuscular once    MEDICATIONS  (PRN):    LABS:                        14.7   5.99  )-----------( 199      ( 10 Robert 2020 19:56 )             48.7     01-11    140  |  105  |  16  ----------------------------<  66<L>  3.7   |  24  |  0.84    Ca    8.8      11 Jan 2020 06:45  Phos  3.0     01-10  Mg     2.0     01-11    TPro  8.7<H>  /  Alb  4.4  /  TBili  0.6  /  DBili  x   /  AST  21  /  ALT  9   /  AlkPhos  62  01-10    PT/INR - ( 10 Robert 2020 19:56 )   PT: 12.7 SEC;   INR: 1.11        PTT - ( 10 Robert 2020 19:56 )  PTT:35.9 SEC    MICROBIOLOGY:     RADIOLOGY:  [ ] Reviewed and interpreted by me    PULMONARY FUNCTION TESTS:    EKG:

## 2020-01-11 NOTE — H&P ADULT - NSICDXPASTMEDICALHX_GEN_ALL_CORE_FT
PAST MEDICAL HISTORY:  Emphysema of lung     Hypertension on no med, monitored by MD    Nodule of left lung monitored by MD , had CT and biopsy 7/2019  inconclusive,  will have next CT 1/2020    Vitamin D deficiency

## 2020-01-11 NOTE — H&P ADULT - NSHPPHYSICALEXAM_GEN_ALL_CORE
Vital Signs Last 24 Hrs  T(C): 36.6 (10 Roebrt 2020 21:38), Max: 37 (10 Robert 2020 18:02)  T(F): 97.9 (10 Robert 2020 21:38), Max: 98.6 (10 Robert 2020 18:02)  HR: 72 (10 Robert 2020 21:38) (71 - 93)  BP: 158/76 (10 Robert 2020 21:38) (142/75 - 166/79)  BP(mean): --  RR: 18 (10 Robert 2020 19:58) (16 - 18)  SpO2: 91% (10 Robert 2020 21:38) (91% - 94%)  CAPILLARY BLOOD GLUCOSE        I&O's Summary  PHYSICAL EXAM:  GENERAL: NAD, well-developed  HEAD:  Atraumatic, Normocephalic  EYES: EOMI, PERRLA, conjunctiva and sclera clear  NECK: Supple, No JVD  CHEST/LUNG: mainly clear, decreased BS left lung  HEART: Regular rate and rhythm; No murmurs, rubs, or gallops  ABDOMEN: Soft, Nontender, Nondistended; Bowel sounds present  EXTREMITIES:  2+ Peripheral Pulses, No clubbing, cyanosis, or edema  PSYCH: AAOx3  NEUROLOGY: non-focal  SKIN: No rashes or lesions

## 2020-01-11 NOTE — H&P ADULT - PROBLEM SELECTOR PLAN 5
check A1c, humalog ISS  Not on meds check A1c, appears controlled, hold off ISS for now  Not on meds

## 2020-01-11 NOTE — H&P ADULT - NSHPOUTPATIENTPROVIDERS_GEN_ALL_CORE
PCP Dr. Michelet Chu [phone], pulmonary Dr. Ramírez Aleman [phone] PCP Dr. Michelet Chu, pulmonologist Dr. Ramírez Aleman

## 2020-01-11 NOTE — PROGRESS NOTE ADULT - ASSESSMENT
Mr. Gomez is a 80 YO M with PMHx of COPD, HTN, DM2, HLD, Left Lung Nodule s/p CT and Lung Biopsy 7/2019 (non-diagnostic/ inconclusive per patient d/t "not enough tissue"), s/p surveillance CT CHEST on Wednesday (1/9) and was found incidentally to have R PTX. Patient states that he was advised to come to the hospital due to CT findings. He noticed increased dyspnea from baseline. In the ED, patient was sating 91% on RA, hemodynamically stable; admitted to RCU. Mr. Gomez is a 78 YO M with PMHx of COPD, HTN, DM2, HLD, Left Lung Nodule s/p CT and Lung Biopsy 7/2019 (non-diagnostic/ inconclusive per patient d/t "not enough tissue"), s/p surveillance CT CHEST on Wednesday (1/9) and was found incidentally to have R PTX. Patient states that he was advised to come to the hospital due to CT findings. He noticed increased dyspnea from baseline. In the ED, patient was sating 91% on RA, hemodynamically stable; admitted to RCU. CT CHEST found with unchanged right sided PTX in house. s/p R PTC placement 1/11 to LWS for now.   # R Spontaneous PTX   - Outpatient CT CHEST on 1/9 with R PTX and bullous emphysematous changes.   - CT CHEST inpatient unchanged R PTX. No signs of tension.   - s/p R PTC 1/11 to LWS for now.   - Continue on O2 supplementation.   - QD CXR to monitor resolution of PTX     # Pulmonary and Bladder Mass  - CHEST CHEST w/ BL pulmonary nodular opacities/ masses likely with metastatic disease.   - CT AP with hyperattenuating mass within dome of the bladder that protrudes into the blader lumen 3.6 x 3.4 cm consistent for possible bladder neoplasm.   - Patient s/p L Lung Nodule in 7/2019 noted to be inconclusive second to poor tissue sample. Patient is a long time smoker with concern for lung nodule to be cancerous.   - Urology consult recommended outpatient follow up with Private Urologist.     # Current Smoker   - Nicotine patch   - Smoking cessation counselling.     # OTHER HISTORY   - HTN: COntinue on Norvasc in house.   - DM2: Hold off on ISS for now. Monitor as diet modifications outpatient. Check A1C.     # DVT PPX with Lovenox QD     Alva Cotton PA-C   Department of Medicine   [facility]   In House Pager [phone]/ [phone]

## 2020-01-11 NOTE — H&P ADULT - NSHPREVIEWOFSYSTEMS_GEN_ALL_CORE
CONSTITUTIONAL: No fever, weight loss, or fatigue  EYES: No eye pain, visual disturbances, or discharge  ENMT:  No difficulty hearing, tinnitus, vertigo; No sinus or throat pain  NECK: No pain or stiffness  BREASTS: No pain, masses, or nipple discharge  RESPIRATORY: + cough, no wheezing, chills or hemoptysis; + shortness of breath  CARDIOVASCULAR: No chest pain, palpitations, dizziness, or leg swelling  GASTROINTESTINAL: No abdominal or epigastric pain. No nausea, vomiting, or hematemesis; No diarrhea or constipation. No melena or hematochezia.  GENITOURINARY: No dysuria, frequency, hematuria, or incontinence  NEUROLOGICAL: No headaches, memory loss, loss of strength, numbness, or tremors  SKIN: No itching, burning, rashes, or lesions   LYMPH NODES: No enlarged glands  ENDOCRINE: No heat or cold intolerance; No hair loss  MUSCULOSKELETAL: No muscle or back pain  PSYCHIATRIC: No depression, anxiety, mood swings, or difficulty sleeping  HEME/LYMPH: No easy bruising, or bleeding gums  ALLERGY AND IMMUNOLOGIC: No hives or eczema

## 2020-01-12 LAB
AFP-TM SERPL-MCNC: 4.7 NG/ML — SIGNIFICANT CHANGE UP
ANION GAP SERPL CALC-SCNC: 10 MMO/L — SIGNIFICANT CHANGE UP (ref 7–14)
BUN SERPL-MCNC: 12 MG/DL — SIGNIFICANT CHANGE UP (ref 7–23)
CALCIUM SERPL-MCNC: 9.3 MG/DL — SIGNIFICANT CHANGE UP (ref 8.4–10.5)
CEA SERPL-MCNC: 3.7 NG/ML — SIGNIFICANT CHANGE UP (ref 1–3.8)
CHLORIDE SERPL-SCNC: 101 MMOL/L — SIGNIFICANT CHANGE UP (ref 98–107)
CO2 SERPL-SCNC: 24 MMOL/L — SIGNIFICANT CHANGE UP (ref 22–31)
CREAT SERPL-MCNC: 0.82 MG/DL — SIGNIFICANT CHANGE UP (ref 0.5–1.3)
GLUCOSE SERPL-MCNC: 88 MG/DL — SIGNIFICANT CHANGE UP (ref 70–99)
HBA1C BLD-MCNC: 5.3 % — SIGNIFICANT CHANGE UP (ref 4–5.6)
HCT VFR BLD CALC: 48.3 % — SIGNIFICANT CHANGE UP (ref 39–50)
HGB BLD-MCNC: 15.1 G/DL — SIGNIFICANT CHANGE UP (ref 13–17)
MAGNESIUM SERPL-MCNC: 2 MG/DL — SIGNIFICANT CHANGE UP (ref 1.6–2.6)
MCHC RBC-ENTMCNC: 31.3 % — LOW (ref 32–36)
MCHC RBC-ENTMCNC: 31.5 PG — SIGNIFICANT CHANGE UP (ref 27–34)
MCV RBC AUTO: 100.6 FL — HIGH (ref 80–100)
NRBC # FLD: 0 K/UL — SIGNIFICANT CHANGE UP (ref 0–0)
PHOSPHATE SERPL-MCNC: 3.3 MG/DL — SIGNIFICANT CHANGE UP (ref 2.5–4.5)
PLATELET # BLD AUTO: 182 K/UL — SIGNIFICANT CHANGE UP (ref 150–400)
PMV BLD: 11.1 FL — SIGNIFICANT CHANGE UP (ref 7–13)
POTASSIUM SERPL-MCNC: 4.3 MMOL/L — SIGNIFICANT CHANGE UP (ref 3.5–5.3)
POTASSIUM SERPL-SCNC: 4.3 MMOL/L — SIGNIFICANT CHANGE UP (ref 3.5–5.3)
PSA FLD-MCNC: 1.52 NG/ML — SIGNIFICANT CHANGE UP (ref 0–4)
RBC # BLD: 4.8 M/UL — SIGNIFICANT CHANGE UP (ref 4.2–5.8)
RBC # FLD: 15.1 % — HIGH (ref 10.3–14.5)
SODIUM SERPL-SCNC: 135 MMOL/L — SIGNIFICANT CHANGE UP (ref 135–145)
WBC # BLD: 6.33 K/UL — SIGNIFICANT CHANGE UP (ref 3.8–10.5)
WBC # FLD AUTO: 6.33 K/UL — SIGNIFICANT CHANGE UP (ref 3.8–10.5)

## 2020-01-12 PROCEDURE — 71045 X-RAY EXAM CHEST 1 VIEW: CPT | Mod: 26

## 2020-01-12 PROCEDURE — 99233 SBSQ HOSP IP/OBS HIGH 50: CPT | Mod: GC

## 2020-01-12 RX ORDER — ACETAMINOPHEN 500 MG
1000 TABLET ORAL ONCE
Refills: 0 | Status: COMPLETED | OUTPATIENT
Start: 2020-01-12 | End: 2020-01-12

## 2020-01-12 RX ADMIN — Medication 3 MILLILITER(S): at 04:11

## 2020-01-12 RX ADMIN — Medication 1 PATCH: at 22:57

## 2020-01-12 RX ADMIN — Medication 1 PATCH: at 20:39

## 2020-01-12 RX ADMIN — PREGABALIN 1000 MICROGRAM(S): 225 CAPSULE ORAL at 12:37

## 2020-01-12 RX ADMIN — ENOXAPARIN SODIUM 40 MILLIGRAM(S): 100 INJECTION SUBCUTANEOUS at 12:37

## 2020-01-12 RX ADMIN — Medication 1 PATCH: at 08:59

## 2020-01-12 RX ADMIN — Medication 3 MILLILITER(S): at 15:29

## 2020-01-12 RX ADMIN — Medication 400 MILLIGRAM(S): at 21:21

## 2020-01-12 RX ADMIN — Medication 1 PATCH: at 12:37

## 2020-01-12 RX ADMIN — BUDESONIDE AND FORMOTEROL FUMARATE DIHYDRATE 2 PUFF(S): 160; 4.5 AEROSOL RESPIRATORY (INHALATION) at 22:02

## 2020-01-12 RX ADMIN — Medication 1000 MILLIGRAM(S): at 21:51

## 2020-01-12 RX ADMIN — AMLODIPINE BESYLATE 5 MILLIGRAM(S): 2.5 TABLET ORAL at 05:32

## 2020-01-12 RX ADMIN — Medication 2000 UNIT(S): at 12:37

## 2020-01-12 RX ADMIN — BUDESONIDE AND FORMOTEROL FUMARATE DIHYDRATE 2 PUFF(S): 160; 4.5 AEROSOL RESPIRATORY (INHALATION) at 10:23

## 2020-01-12 RX ADMIN — Medication 3 MILLILITER(S): at 22:00

## 2020-01-12 RX ADMIN — Medication 3 MILLILITER(S): at 10:23

## 2020-01-12 NOTE — PROGRESS NOTE ADULT - SUBJECTIVE AND OBJECTIVE BOX
CHIEF COMPLAINT: Patient is a 79y old  Male who presents with a chief complaint of SOB f/w PTX (11 Jan 2020 08:04)    SUBJECTIVE:   No interval events overnight.   [ ] All other systems negative  [ ] Unable to assess ROS because ________    OBJECTIVE:  ICU Vital Signs Last 24 Hrs  T(C): 36.8 (12 Jan 2020 05:31), Max: 36.9 (11 Jan 2020 21:58)  T(F): 98.3 (12 Jan 2020 05:31), Max: 98.5 (11 Jan 2020 21:58)  HR: 75 (12 Jan 2020 05:31) (58 - 75)  BP: 134/66 (12 Jan 2020 05:31) (131/74 - 137/77)  BP(mean): --  ABP: --  ABP(mean): --  RR: 16 (12 Jan 2020 05:31) (16 - 18)  SpO2: 96% (12 Jan 2020 05:31) (96% - 98%)    01-11 @ 07:01  -  01-12 @ 07:00  --------------------------------------------------------  IN: 0 mL / OUT: 200 mL / NET: -200 mL    CAPILLARY BLOOD GLUCOSE  POCT Blood Glucose.: 122 mg/dL (11 Jan 2020 17:28)    PHYSICAL EXAM:  General:   HEENT:   Lymph Nodes:  Neck:   Respiratory:   Cardiovascular:   Abdomen:   Extremities:   Skin:   Neurological:  Psychiatry:    HOSPITAL MEDICATIONS:  MEDICATIONS  (STANDING):  albuterol/ipratropium for Nebulization 3 milliLiter(s) Nebulizer every 6 hours  amLODIPine   Tablet 5 milliGRAM(s) Oral daily  budesonide 160 MICROgram(s)/formoterol 4.5 MICROgram(s) Inhaler 2 Puff(s) Inhalation two times a day  cholecalciferol 2000 Unit(s) Oral daily  cyanocobalamin 1000 MICROGram(s) Oral daily  enoxaparin Injectable 40 milliGRAM(s) SubCutaneous daily  influenza  Vaccine (HIGH DOSE) 0.5 milliLiter(s) IntraMuscular once  lidocaine 1% Injectable 30 milliLiter(s) Local Injection once  nicotine -  14 mG/24Hr(s) Patch 1 patch Transdermal daily    MEDICATIONS  (PRN):    LABS:                        15.1   6.33  )-----------( 182      ( 12 Jan 2020 05:25 )             48.3     01-12    135  |  101  |  12  ----------------------------<  88  4.3   |  24  |  0.82    Ca    9.3      12 Jan 2020 05:25  Phos  3.3     01-12  Mg     2.0     01-12    TPro  8.7<H>  /  Alb  4.4  /  TBili  0.6  /  DBili  x   /  AST  21  /  ALT  9   /  AlkPhos  62  01-10    PT/INR - ( 10 Robert 2020 19:56 )   PT: 12.7 SEC;   INR: 1.11        PTT - ( 10 Robert 2020 19:56 )  PTT:35.9 SEC    MICROBIOLOGY:     RADIOLOGY:  [ ] Reviewed and interpreted by me    PULMONARY FUNCTION TESTS:    EKG: CHIEF COMPLAINT: Patient is a 79y old  Male who presents with a chief complaint of SOB f/w PTX (11 Jan 2020 08:04)    SUBJECTIVE:   No interval events overnight. Seen by bedside during AM rounds with no complaints. He denies fever, chills, chest pain, palpitation, SOB, wheezing, dyspnea, abdominal pain, N/V/D/C.   [ x] All other systems negative    OBJECTIVE:  ICU Vital Signs Last 24 Hrs  T(C): 36.8 (12 Jan 2020 05:31), Max: 36.9 (11 Jan 2020 21:58)  T(F): 98.3 (12 Jan 2020 05:31), Max: 98.5 (11 Jan 2020 21:58)  HR: 75 (12 Jan 2020 05:31) (58 - 75)  BP: 134/66 (12 Jan 2020 05:31) (131/74 - 137/77)  BP(mean): --  ABP: --  ABP(mean): --  RR: 16 (12 Jan 2020 05:31) (16 - 18)  SpO2: 96% (12 Jan 2020 05:31) (96% - 98%)    01-11 @ 07:01  -  01-12 @ 07:00  --------------------------------------------------------  IN: 0 mL / OUT: 200 mL / NET: -200 mL    CAPILLARY BLOOD GLUCOSE  POCT Blood Glucose.: 122 mg/dL (11 Jan 2020 17:28)    PHYSICAL EXAM:  General: NAD   Cards: S1/S2, no murmurs   Pulm: CTA bilaterally. No wheezes. (+) R Anterior PTC to LWS stable. Clean dry and intact.   Abdomen: Soft, NTND. BS (+)   Extremities: No pedal edema. AV of BL upper and lower extremities.  Neurology: AOx3, CN 2-12 intact. Sensation intact.     HOSPITAL MEDICATIONS:  MEDICATIONS  (STANDING):  albuterol/ipratropium for Nebulization 3 milliLiter(s) Nebulizer every 6 hours  amLODIPine   Tablet 5 milliGRAM(s) Oral daily  budesonide 160 MICROgram(s)/formoterol 4.5 MICROgram(s) Inhaler 2 Puff(s) Inhalation two times a day  cholecalciferol 2000 Unit(s) Oral daily  cyanocobalamin 1000 MICROGram(s) Oral daily  enoxaparin Injectable 40 milliGRAM(s) SubCutaneous daily  influenza  Vaccine (HIGH DOSE) 0.5 milliLiter(s) IntraMuscular once  lidocaine 1% Injectable 30 milliLiter(s) Local Injection once  nicotine -  14 mG/24Hr(s) Patch 1 patch Transdermal daily    MEDICATIONS  (PRN):    LABS:                        15.1   6.33  )-----------( 182      ( 12 Jan 2020 05:25 )             48.3     01-12    135  |  101  |  12  ----------------------------<  88  4.3   |  24  |  0.82    Ca    9.3      12 Jan 2020 05:25  Phos  3.3     01-12  Mg     2.0     01-12    TPro  8.7<H>  /  Alb  4.4  /  TBili  0.6  /  DBili  x   /  AST  21  /  ALT  9   /  AlkPhos  62  01-10    PT/INR - ( 10 Robert 2020 19:56 )   PT: 12.7 SEC;   INR: 1.11        PTT - ( 10 Robert 2020 19:56 )  PTT:35.9 SEC    MICROBIOLOGY:     RADIOLOGY:  [ ] Reviewed and interpreted by me    PULMONARY FUNCTION TESTS:    EKG:

## 2020-01-12 NOTE — PROGRESS NOTE ADULT - ASSESSMENT
Mr. Gomez is a 80 YO M with PMHx of COPD, HTN, DM2, HLD, Left Lung Nodule s/p CT and Lung Biopsy 7/2019 (non-diagnostic/ inconclusive per patient d/t "not enough tissue"), s/p surveillance CT CHEST on Wednesday (1/9) and was found incidentally to have R PTX. Patient states that he was advised to come to the hospital due to CT findings. He noticed increased dyspnea from baseline. In the ED, patient was sating 91% on RA, hemodynamically stable; admitted to RCU. CT CHEST found with unchanged right sided PTX in house. s/p R PTC placement 1/11 to LWS for now.   # R Spontaneous PTX   - Outpatient CT CHEST on 1/9 with R PTX and bullous emphysematous changes.   - CT CHEST inpatient unchanged R PTX. No signs of tension.   - s/p R PTC 1/11 to LWS with post CXR showing re-aeration of right lung.   - Continue on O2 supplementation.   - QD CXR to monitor resolution of PTX     # Pulmonary and Bladder Mass  - CHEST CHEST w/ BL pulmonary nodular opacities/ masses likely with metastatic disease.   - CT AP with hyperattenuating mass within dome of the bladder that protrudes into the blader lumen 3.6 x 3.4 cm consistent for possible bladder neoplasm.   - Patient s/p L Lung Nodule in 7/2019 noted to be inconclusive second to poor tissue sample. Patient is a long time smoker with concern for lung nodule to be cancerous.   - No hematuria. Urology consult recommended outpatient follow up with Private Urologist.     # Current Smoker   - Nicotine patch   - Smoking cessation counselling.     # OTHER HISTORY   - HTN: COntinue on Norvasc in house.   - DM2: Hold off on ISS for now. Monitor as diet modifications outpatient. Check A1C.     # DVT PPX with Lovenox QD     Alva Cotton PA-C   Department of Medicine   [facility]   In House Pager [phone]/ [phone] Mr. Gomez is a 80 YO M with PMHx of COPD, HTN, DM2, HLD, Left Lung Nodule s/p CT and Lung Biopsy 7/2019 (non-diagnostic/ inconclusive per patient d/t "not enough tissue"), s/p surveillance CT CHEST on Wednesday (1/9) and was found incidentally to have R PTX. Patient states that he was advised to come to the hospital due to CT findings. He noticed increased dyspnea from baseline. In the ED, patient was sating 91% on RA, hemodynamically stable; admitted to RCU. CT CHEST found with unchanged right sided PTX in house. s/p R PTC placement 1/11 to LWS for now.   # R Spontaneous PTX   - Outpatient CT CHEST on 1/9 with R PTX and bullous emphysematous changes.   - CT CHEST inpatient unchanged R PTX. No signs of tension.   - s/p R PTC 1/11 to LWS changed to H20 seal 1/12.   - Continue on O2 supplementation.   - QD CXR to monitor resolution of PTX     # Pulmonary and Bladder Mass  - CHEST CHEST w/ BL pulmonary nodular opacities/ masses likely with metastatic disease.   - CT AP with hyperattenuating mass within dome of the bladder that protrudes into the blader lumen 3.6 x 3.4 cm consistent for possible bladder neoplasm.   - Patient s/p L Lung Nodule in 7/2019 noted to be inconclusive second to poor tissue sample. Patient is a long time smoker with concern for lung nodule to be cancerous.   - No hematuria. Urology consult recommended outpatient follow up with Private Urologist.     # Current Smoker   - Nicotine patch   - Smoking cessation counselling.     # OTHER HISTORY   - HTN: COntinue on Norvasc in house.   - DM2: Hold off on ISS for now. Monitor as diet modifications outpatient. Check A1C.     # DVT PPX with Lovenox QD     Alva Cotton PA-C   Department of Medicine   [facility]   In House Pager [phone]/ [phone]

## 2020-01-13 DIAGNOSIS — R31.9 HEMATURIA, UNSPECIFIED: ICD-10-CM

## 2020-01-13 PROCEDURE — 99233 SBSQ HOSP IP/OBS HIGH 50: CPT | Mod: GC

## 2020-01-13 PROCEDURE — 71045 X-RAY EXAM CHEST 1 VIEW: CPT | Mod: 26

## 2020-01-13 RX ORDER — ACETAMINOPHEN 500 MG
650 TABLET ORAL EVERY 6 HOURS
Refills: 0 | Status: DISCONTINUED | OUTPATIENT
Start: 2020-01-13 | End: 2020-01-15

## 2020-01-13 RX ADMIN — Medication 3 MILLILITER(S): at 09:44

## 2020-01-13 RX ADMIN — Medication 2000 UNIT(S): at 12:36

## 2020-01-13 RX ADMIN — Medication 3 MILLILITER(S): at 03:13

## 2020-01-13 RX ADMIN — Medication 650 MILLIGRAM(S): at 22:00

## 2020-01-13 RX ADMIN — BUDESONIDE AND FORMOTEROL FUMARATE DIHYDRATE 2 PUFF(S): 160; 4.5 AEROSOL RESPIRATORY (INHALATION) at 22:55

## 2020-01-13 RX ADMIN — Medication 1 PATCH: at 06:48

## 2020-01-13 RX ADMIN — AMLODIPINE BESYLATE 5 MILLIGRAM(S): 2.5 TABLET ORAL at 06:09

## 2020-01-13 RX ADMIN — Medication 1 PATCH: at 12:36

## 2020-01-13 RX ADMIN — Medication 3 MILLILITER(S): at 22:55

## 2020-01-13 RX ADMIN — PREGABALIN 1000 MICROGRAM(S): 225 CAPSULE ORAL at 12:36

## 2020-01-13 RX ADMIN — ENOXAPARIN SODIUM 40 MILLIGRAM(S): 100 INJECTION SUBCUTANEOUS at 12:36

## 2020-01-13 RX ADMIN — Medication 3 MILLILITER(S): at 15:50

## 2020-01-13 RX ADMIN — BUDESONIDE AND FORMOTEROL FUMARATE DIHYDRATE 2 PUFF(S): 160; 4.5 AEROSOL RESPIRATORY (INHALATION) at 09:44

## 2020-01-13 RX ADMIN — Medication 650 MILLIGRAM(S): at 21:25

## 2020-01-13 RX ADMIN — Medication 1 PATCH: at 21:17

## 2020-01-13 RX ADMIN — Medication 1 PATCH: at 12:37

## 2020-01-13 NOTE — PROGRESS NOTE ADULT - SUBJECTIVE AND OBJECTIVE BOX
CHIEF COMPLAINT: Patient is a 79y old  Male who presents with a chief complaint of SOB (12 Jan 2020 07:59)    SUBJECTIVE:   No interval events overnight.   [ ] All other systems negative  [ ] Unable to assess ROS because ________    OBJECTIVE:  ICU Vital Signs Last 24 Hrs  T(C): 36.8 (13 Jan 2020 06:06), Max: 36.8 (12 Jan 2020 21:19)  T(F): 98.3 (13 Jan 2020 06:06), Max: 98.3 (12 Jan 2020 21:19)  HR: 68 (13 Jan 2020 06:06) (65 - 84)  BP: 125/65 (13 Jan 2020 06:06) (117/72 - 136/65)  BP(mean): --  ABP: --  ABP(mean): --  RR: 16 (13 Jan 2020 06:06) (16 - 16)  SpO2: 95% (13 Jan 2020 06:06) (95% - 99%)        01-12 @ 07:01  -  01-13 @ 07:00  --------------------------------------------------------  IN: 960 mL / OUT: 1142 mL / NET: -182 mL    CAPILLARY BLOOD GLUCOSE  POCT Blood Glucose.: 122 mg/dL (11 Jan 2020 17:28)    PHYSICAL EXAM:  General:   HEENT:   Lymph Nodes:  Neck:   Respiratory:   Cardiovascular:   Abdomen:   Extremities:   Skin:   Neurological:  Psychiatry:    HOSPITAL MEDICATIONS:  MEDICATIONS  (STANDING):  albuterol/ipratropium for Nebulization 3 milliLiter(s) Nebulizer every 6 hours  amLODIPine   Tablet 5 milliGRAM(s) Oral daily  budesonide 160 MICROgram(s)/formoterol 4.5 MICROgram(s) Inhaler 2 Puff(s) Inhalation two times a day  cholecalciferol 2000 Unit(s) Oral daily  cyanocobalamin 1000 MICROGram(s) Oral daily  enoxaparin Injectable 40 milliGRAM(s) SubCutaneous daily  influenza  Vaccine (HIGH DOSE) 0.5 milliLiter(s) IntraMuscular once  lidocaine 1% Injectable 30 milliLiter(s) Local Injection once  nicotine -  14 mG/24Hr(s) Patch 1 patch Transdermal daily    MEDICATIONS  (PRN):    LABS:                        15.1   6.33  )-----------( 182      ( 12 Jan 2020 05:25 )             48.3     01-12    135  |  101  |  12  ----------------------------<  88  4.3   |  24  |  0.82    Ca    9.3      12 Jan 2020 05:25  Phos  3.3     01-12  Mg     2.0     01-12    MICROBIOLOGY:     RADIOLOGY:  [ ] Reviewed and interpreted by me    PULMONARY FUNCTION TESTS:    EKG: CHIEF COMPLAINT: Patient is a 79y old  Male who presents with a chief complaint of SOB (12 Jan 2020 07:59)    SUBJECTIVE:   No interval events overnight. Patient seen on AM rounds and denies fever, chills, chest pain, palpitation, SOB, wheezing, dyspnea, abdominal pain, N/V/D/C.   [ x] All other systems negative    OBJECTIVE:  ICU Vital Signs Last 24 Hrs  T(C): 36.8 (13 Jan 2020 06:06), Max: 36.8 (12 Jan 2020 21:19)  T(F): 98.3 (13 Jan 2020 06:06), Max: 98.3 (12 Jan 2020 21:19)  HR: 68 (13 Jan 2020 06:06) (65 - 84)  BP: 125/65 (13 Jan 2020 06:06) (117/72 - 136/65)  BP(mean): --  ABP: --  ABP(mean): --  RR: 16 (13 Jan 2020 06:06) (16 - 16)  SpO2: 95% (13 Jan 2020 06:06) (95% - 99%)    01-12 @ 07:01  -  01-13 @ 07:00  --------------------------------------------------------  IN: 960 mL / OUT: 1142 mL / NET: -182 mL    CAPILLARY BLOOD GLUCOSE  POCT Blood Glucose.: 122 mg/dL (11 Jan 2020 17:28)    PHYSICAL EXAM:  General: NAD   Cards: S1/S2, no murmurs   Pulm: CTA bilaterally. No wheezes. (+) R Anterior PTC to H20 stable. Clean dry and intact.   Abdomen: Soft, NTND. BS (+)   Extremities: No pedal edema. AV of BL upper and lower extremities.  Neurology: AOx3, CN 2-12 intact. Sensation intact.     HOSPITAL MEDICATIONS:  MEDICATIONS  (STANDING):  albuterol/ipratropium for Nebulization 3 milliLiter(s) Nebulizer every 6 hours  amLODIPine   Tablet 5 milliGRAM(s) Oral daily  budesonide 160 MICROgram(s)/formoterol 4.5 MICROgram(s) Inhaler 2 Puff(s) Inhalation two times a day  cholecalciferol 2000 Unit(s) Oral daily  cyanocobalamin 1000 MICROGram(s) Oral daily  enoxaparin Injectable 40 milliGRAM(s) SubCutaneous daily  influenza  Vaccine (HIGH DOSE) 0.5 milliLiter(s) IntraMuscular once  lidocaine 1% Injectable 30 milliLiter(s) Local Injection once  nicotine -  14 mG/24Hr(s) Patch 1 patch Transdermal daily    MEDICATIONS  (PRN):    LABS:                        15.1   6.33  )-----------( 182      ( 12 Jan 2020 05:25 )             48.3     01-12    135  |  101  |  12  ----------------------------<  88  4.3   |  24  |  0.82    Ca    9.3      12 Jan 2020 05:25  Phos  3.3     01-12  Mg     2.0     01-12    MICROBIOLOGY:     RADIOLOGY:  [ ] Reviewed and interpreted by me    PULMONARY FUNCTION TESTS:    EKG:

## 2020-01-13 NOTE — PROGRESS NOTE ADULT - ASSESSMENT
Mr. Gomez is a 78 YO M with PMHx of COPD, HTN, DM2, HLD, Left Lung Nodule s/p CT and Lung Biopsy 7/2019 (non-diagnostic/ inconclusive per patient d/t "not enough tissue"), s/p surveillance CT CHEST on Wednesday (1/9) and was found incidentally to have R PTX. Patient states that he was advised to come to the hospital due to CT findings. He noticed increased dyspnea from baseline. In the ED, patient was sating 91% on RA, hemodynamically stable; admitted to RCU. CT CHEST found with unchanged right sided PTX in house. s/p R PTC placement 1/11 to LWS for now.   # R Spontaneous PTX   - Outpatient CT CHEST on 1/9 with R PTX and bullous emphysematous changes.   - CT CHEST inpatient unchanged R PTX. No signs of tension.   - s/p R PTC 1/11 to LWS changed to H20 seal 1/12.   - Continue on O2 supplementation.   - QD CXR to monitor resolution of PTX     # Pulmonary and Bladder Mass  - CHEST CHEST w/ BL pulmonary nodular opacities/ masses likely with metastatic disease.   - CT AP with hyperattenuating mass within dome of the bladder that protrudes into the blader lumen 3.6 x 3.4 cm consistent for possible bladder neoplasm.   - Patient s/p L Lung Nodule in 7/2019 noted to be inconclusive second to poor tissue sample. Patient is a long time smoker with concern for lung nodule to be cancerous.   - No hematuria. Urology consult recommended outpatient follow up with Private Urologist.     # Current Smoker   - Nicotine patch   - Smoking cessation counselling.     # OTHER HISTORY   - HTN: COntinue on Norvasc in house.   - DM2: Hold off on ISS for now. Monitor as diet modifications outpatient. Check A1C.     # DVT PPX with Lovenox QD     Alva Cotton PA-C   Department of Medicine   [facility]   In House Pager [phone]/ [phone] Mr. Gomez is a 78 YO M with PMHx of COPD, HTN, DM2, HLD, Left Lung Nodule s/p CT and Lung Biopsy 7/2019 (non-diagnostic/ inconclusive per patient d/t "not enough tissue"), s/p surveillance CT CHEST on Wednesday (1/9) and was found incidentally to have R PTX. Patient states that he was advised to come to the hospital due to CT findings. He noticed increased dyspnea from baseline. In the ED, patient was sating 91% on RA, hemodynamically stable; admitted to RCU. CT CHEST found with unchanged right sided PTX in house. s/p R PTC placement 1/11 to LWS for now.   # R Spontaneous PTX   - Outpatient CT CHEST on 1/9 with R PTX and bullous emphysematous changes.   - CT CHEST inpatient unchanged R PTX. No signs of tension.   - s/p R PTC 1/11 to LWS changed to H20 seal 1/12 then clamped 1/13.   - Continue on O2 supplementation.   - QD CXR to monitor resolution of PTX     # Hematuria with Bladder Mass   - CT AP with hyperattenuating mass within dome of the bladder that protrudes into the blader lumen 3.6 x 3.4 cm consistent for possible bladder neoplasm.   - Initially no hematuria and Urology consult recommended outpatient follow up with Private Urologist.   - Monitor hematuria. IF continues and large quantity/ unresolved, then reconsult Urology.     # BL Lung Mass  - CHEST CHEST w/ BL pulmonary nodular opacities/ masses likely with metastatic disease.   - Patient s/p L Lung Nodule in 7/2019 noted to be inconclusive second to poor tissue sample. Patient is a long time smoker with concern for lung nodule to be cancerous.   - Wife to bring in old CT LUNG imaging for comparison. Unlikely for biopsy during this is admission, but bx likely to be planned for outpatient.     # Current Smoker   - Nicotine patch   - Smoking cessation counselling.     # OTHER HISTORY   - HTN: COntinue on Norvasc in house.   - DM2: Hold off on ISS for now. Monitor as diet modifications outpatient. Check A1C.     # DVT PPX with Lovenox QD     Alva Cotton PA-C   Department of Medicine   [facility]   In House Pager [phone]/ [phone]

## 2020-01-14 ENCOUNTER — TRANSCRIPTION ENCOUNTER (OUTPATIENT)
Age: 80
End: 2020-01-14

## 2020-01-14 PROCEDURE — 99233 SBSQ HOSP IP/OBS HIGH 50: CPT | Mod: GC

## 2020-01-14 PROCEDURE — 71045 X-RAY EXAM CHEST 1 VIEW: CPT | Mod: 26,77

## 2020-01-14 PROCEDURE — 71045 X-RAY EXAM CHEST 1 VIEW: CPT | Mod: 26

## 2020-01-14 RX ORDER — ALBUTEROL 90 UG/1
2.5 AEROSOL, METERED ORAL EVERY 6 HOURS
Refills: 0 | Status: DISCONTINUED | OUTPATIENT
Start: 2020-01-14 | End: 2020-01-15

## 2020-01-14 RX ORDER — TIOTROPIUM BROMIDE 18 UG/1
1 CAPSULE ORAL; RESPIRATORY (INHALATION) DAILY
Refills: 0 | Status: DISCONTINUED | OUTPATIENT
Start: 2020-01-14 | End: 2020-01-15

## 2020-01-14 RX ORDER — CHLORHEXIDINE GLUCONATE 213 G/1000ML
1 SOLUTION TOPICAL DAILY
Refills: 0 | Status: DISCONTINUED | OUTPATIENT
Start: 2020-01-14 | End: 2020-01-15

## 2020-01-14 RX ADMIN — BUDESONIDE AND FORMOTEROL FUMARATE DIHYDRATE 2 PUFF(S): 160; 4.5 AEROSOL RESPIRATORY (INHALATION) at 09:50

## 2020-01-14 RX ADMIN — BUDESONIDE AND FORMOTEROL FUMARATE DIHYDRATE 2 PUFF(S): 160; 4.5 AEROSOL RESPIRATORY (INHALATION) at 21:17

## 2020-01-14 RX ADMIN — TIOTROPIUM BROMIDE 1 CAPSULE(S): 18 CAPSULE ORAL; RESPIRATORY (INHALATION) at 16:17

## 2020-01-14 RX ADMIN — Medication 2000 UNIT(S): at 11:54

## 2020-01-14 RX ADMIN — Medication 1 PATCH: at 07:00

## 2020-01-14 RX ADMIN — Medication 1 PATCH: at 19:00

## 2020-01-14 RX ADMIN — Medication 3 MILLILITER(S): at 04:02

## 2020-01-14 RX ADMIN — AMLODIPINE BESYLATE 5 MILLIGRAM(S): 2.5 TABLET ORAL at 05:12

## 2020-01-14 RX ADMIN — ENOXAPARIN SODIUM 40 MILLIGRAM(S): 100 INJECTION SUBCUTANEOUS at 11:54

## 2020-01-14 RX ADMIN — Medication 1 PATCH: at 11:57

## 2020-01-14 RX ADMIN — PREGABALIN 1000 MICROGRAM(S): 225 CAPSULE ORAL at 11:54

## 2020-01-14 RX ADMIN — Medication 1 PATCH: at 11:54

## 2020-01-14 NOTE — DISCHARGE NOTE PROVIDER - HOSPITAL COURSE
Mr. Gomez is a 80 YO M with PMHx of COPD, HTN, DM2, HLD, Left Lung Nodule s/p CT and Lung Biopsy 7/2019 (non-diagnostic/ inconclusive per patient d/t "not enough tissue"), s/p surveillance CT CHEST on Wednesday (1/9) and was found incidentally to have R PTX. Patient states that he was advised to come to the hospital due to CT findings. He noticed increased dyspnea from baseline. In the ED, patient was sating 91% on RA, hemodynamically stable; admitted to RCU. CT CHEST found with unchanged right sided PTX in house. s/p R PTC placement 1/11 to LWS for now.         # R Spontaneous PTX     - Outpatient CT CHEST on 1/9 with R PTX and bullous emphysematous changes.     - CT CHEST inpatient unchanged R PTX. No signs of tension.     - s/p R PTC 1/11 to LWS changed to H20 seal 1/12 then clamped 1/13.     - Chest tube removed 1/14        # Hematuria with Bladder Mass     - CT AP with hyperattenuating mass within dome of the bladder that protrudes into the blader lumen 3.6 x 3.4 cm consistent for possible bladder neoplasm.     - Initially no hematuria and Urology consult recommended outpatient follow up with Private Urologist.         # BL Lung Mass    - CHEST CHEST w/ BL pulmonary nodular opacities/masses likely with metastatic disease.     - Patient s/p L Lung Nodule in 7/2019 noted to be inconclusive second to poor tissue sample. - Patient is a long time smoker with concern for lung nodule to be cancerous.         # Current Smoker     - Nicotine patch     - Smoking cessation counselling.         - HTN: Continue on Norvasc in house.     - DM2: Hold off on ISS for now. Monitor as diet modifications outpatient.        Discussed case with  __________________, patient medically stable for discharge to home. Mr. Gomez is a 80 YO M with PMHx of COPD, HTN, DM2, HLD, Left Lung Nodule s/p CT and Lung Biopsy 7/2019 (non-diagnostic/ inconclusive per patient d/t "not enough tissue"), s/p surveillance CT CHEST on Wednesday (1/9) and was found incidentally to have R PTX. Patient states that he was advised to come to the hospital due to CT findings.     In the ED chest tube was placed on 1/11- on suction, then weaned to clamped, then removed on 1/14.     Post removal CXR stable.    Bladder mass noted on CT - pt is aware - outpatient follow up.    Lung masses also noted - pt states that he previously had biopsy done, negative - further outpatient follow up.    Current smoker - smoking cessation encouraged.    Pt requires home o2, but is refusing at this time - states that he feels well, and does not go out often to require o2 upon ambulation.    Outpatient follow up with private pulmonary.        Discussed case with Dr. Lisker, patient medically stable for discharge to home.

## 2020-01-14 NOTE — PROGRESS NOTE ADULT - SUBJECTIVE AND OBJECTIVE BOX
PULMONARY PROGRESS NOTE    Chart and meds reviewed.  Patient seen and examined.    CC: Patient is a 79y old  Male who presents with a chief complaint of sob, pneumothorax (14 Jan 2020 09:35)    Interval History: none overnight     Review of systems:  General: denies fevers, chills, or sweats  Cardiovascular: denies chest pain or heart palpitations  Respiratory: denies shortness of breath/increased work of breathing  Abdomen: denies abdominal pain, denies N/V/C/D  Remaining review of systems negative     MEDICATIONS  (STANDING):  amLODIPine   Tablet 5 milliGRAM(s) Oral daily  budesonide 160 MICROgram(s)/formoterol 4.5 MICROgram(s) Inhaler 2 Puff(s) Inhalation two times a day  chlorhexidine 4% Liquid 1 Application(s) Topical daily  cholecalciferol 2000 Unit(s) Oral daily  cyanocobalamin 1000 MICROGram(s) Oral daily  enoxaparin Injectable 40 milliGRAM(s) SubCutaneous daily  influenza  Vaccine (HIGH DOSE) 0.5 milliLiter(s) IntraMuscular once  lidocaine 1% Injectable 30 milliLiter(s) Local Injection once  nicotine -  14 mG/24Hr(s) Patch 1 patch Transdermal daily  tiotropium 18 MICROgram(s) Capsule 1 Capsule(s) Inhalation daily    MEDICATIONS  (PRN):  acetaminophen   Tablet .. 650 milliGRAM(s) Oral every 6 hours PRN Mild Pain (1 - 3), Moderate Pain (4 - 6)  ALBUTerol    0.083% 2.5 milliGRAM(s) Nebulizer every 6 hours PRN Shortness of Breath and/or Wheezing      VITALS SIGNS:  T(F): 98.3 (01-14-20 @ 13:11), Max: 98.3 (01-14-20 @ 05:10)  HR: 90 (01-14-20 @ 13:11) (68 - 90)  BP: 115/62 (01-14-20 @ 13:11) (115/62 - 124/76)  RR: 17 (01-14-20 @ 13:11) (16 - 17)  SpO2: 95% (01-14-20 @ 13:11) (95% - 98%)  Wt(kg): --    I&O's Summary    13 Jan 2020 07:01  -  14 Jan 2020 07:00  --------------------------------------------------------  IN: 760 mL / OUT: 1058 mL / NET: -298 mL        CAPILLARY BLOOD GLUCOSE            LABS:                                                      CULTURES:

## 2020-01-14 NOTE — DISCHARGE NOTE PROVIDER - NSFOLLOWUPCLINICS_GEN_ALL_ED_FT
[facility] Pulmonolgy and Sleep Medicine  Pulmonology  [address], [address]  [place] [address]  Phone: [phone]  Fax:   Follow Up Time:     [facility] Specialty Clinics  Urology  [address] [place] [address] [place] [address]  Phone: [phone]  Fax:   Follow Up Time:

## 2020-01-14 NOTE — DISCHARGE NOTE PROVIDER - NSDCFUADDAPPT_GEN_ALL_CORE_FT
Please follow up with Dr. Aleman within 1 week of discharge from the hospital.     Please follow up with your primary care provider within 1 week of discharge from the hospital. If you do not have a primary care provider please follow up with the [facility], call [phone]

## 2020-01-14 NOTE — DISCHARGE NOTE PROVIDER - CARE PROVIDER_API CALL
Ramírez Aleman)  Internal Medicine; Pulmonary Disease  [address]  [place] [address]  Phone: [phone]  Fax: [phone]  Follow Up Time:

## 2020-01-14 NOTE — DISCHARGE NOTE PROVIDER - NSDCCPCAREPLAN_GEN_ALL_CORE_FT
PRINCIPAL DISCHARGE DIAGNOSIS  Diagnosis: Pneumothorax  Assessment and Plan of Treatment: - Outpatient CT CHEST on 1/9 with R PTX and bullous emphysematous changes.   - CT CHEST inpatient unchanged R PTX. No signs of tension.   - s/p R PTC 1/11 to LWS changed to H20 seal 1/12 then clamped 1/13.   - Chest tube removed 1/14      SECONDARY DISCHARGE DIAGNOSES  Diagnosis: Hematuria  Assessment and Plan of Treatment: Monitor for blood in your urine. Urology conulted in hospital. Please follow up with urology as outpatient for further workup and management.    Diagnosis: Diabetes 1.5, managed as type 2  Assessment and Plan of Treatment: Continue your medication regimen and a consistent carbohydrate diet (Meaning eating the same amount of carbohydrates at the same time each day). Monitor blood glucose levels throughout the day before meals and at bedtime. Record blood sugars and bring to outpatient providers appointment in order to be reviewed by your doctor for management modifications. Monitor for signs/symptoms of low blood glucose, such as, dizziness, altered mental status, or cool/clammy skin. In addition, monitor for signs/symptoms of high blood glucose, such as, feeling hot, dry, fatigued, or with increased thirst/urination. Make regular podiatry appointments in order to have feet checked for wounds and uncontrolled toe nail growth to prevent infections, as well as, appointments with an ophthalmologist to monitor your vision.    Diagnosis: Nodule of left lung  Assessment and Plan of Treatment: - CHEST CHEST w/ BL pulmonary nodular opacities/masses. These finding need further workup.   - Patient s/p L Lung Nodule in 7/2019 noted to be inconclusive second to poor tissue sample.   - Patient is a long time smoker with concern for lung nodule to be cancerous.    Diagnosis: Smoking  Assessment and Plan of Treatment: Please abstain from smoking in order to optimize your health and prevent adverse events.    Diagnosis: Hypertension  Assessment and Plan of Treatment: Continue blood pressure medication regimen as directed. Monitor for any visual changes, headaches or dizziness.  Monitor blood pressure regularly.  Follow up with your PCP for further management for high blood pressure. PRINCIPAL DISCHARGE DIAGNOSIS  Diagnosis: Pneumothorax  Assessment and Plan of Treatment: Required chest tube to right side - now removed. Pneumothorax resolved.  Continue to monitor for any worsening shortness of breath.  Please follow up with your pulmonologist or your primary care doctor within 1 week of discharge.  You may also call the clinic for an appointment if you choose.      SECONDARY DISCHARGE DIAGNOSES  Diagnosis: Nodule of left lung  Assessment and Plan of Treatment: Bilateral pulmonary nodular opacities/masses. These finding need further workup.   You previously reportedly had a biopsy that was negative - you will need further treatment and management.  Please follow up with your pulmonologist/ primary care doctor.    Diagnosis: Smoking  Assessment and Plan of Treatment: Please abstain from smoking in order to optimize your health and prevent adverse events.  Nicotene patches are availalbe over the counter.    Diagnosis: Diabetes 1.5, managed as type 2  Assessment and Plan of Treatment: Your a1c is 5.3 - diabetes controlled.  Continue your consistent carbohydrate diet (Meaning eating the same amount of carbohydrates at the same time each day). Monitor for signs/symptoms of low blood glucose, such as, dizziness, altered mental status, or cool/clammy skin. In addition, monitor for signs/symptoms of high blood glucose, such as, feeling hot, dry, fatigued, or with increased thirst/urination. Make regular podiatry appointments in order to have feet checked for wounds and uncontrolled toe nail growth to prevent infections, as well as, appointments with an ophthalmologist to monitor your vision.    Diagnosis: Hematuria  Assessment and Plan of Treatment: Monitor for blood in your urine.  Also noted with bladder mass - no inpatient workup as per urology at this time.  Please follow up with urology as outpatient for further workup and management.    Diagnosis: Hypertension  Assessment and Plan of Treatment: Continue blood pressure medication regimen as directed. Monitor for any visual changes, headaches or dizziness.  Monitor blood pressure regularly.  Follow up with your PCP for further management for high blood pressure.

## 2020-01-14 NOTE — PROGRESS NOTE ADULT - ASSESSMENT
Mr. Gomez is a 80 YO M with PMHx of COPD, HTN, DM2, HLD, Left Lung Nodule s/p CT and Lung Biopsy 7/2019 (non-diagnostic/ inconclusive per patient d/t "not enough tissue"), s/p surveillance CT CHEST on Wednesday (1/9) and was found incidentally to have R PTX. Patient states that he was advised to come to the hospital due to CT findings. He noticed increased dyspnea from baseline. In the ED, patient was sating 91% on RA, hemodynamically stable; admitted to RCU. CT CHEST found with unchanged right sided PTX in house. s/p R PTC placement 1/11 to LWS for now.   # R Spontaneous PTX   - Outpatient CT CHEST on 1/9 with R PTX and bullous emphysematous changes.   - CT CHEST inpatient unchanged R PTX. No signs of tension.   - s/p R PTC 1/11 to LWS changed to H20 seal 1/12 then clamped 1/13.   - Continue on O2 supplementation.   - QD CXR to monitor resolution of PTX     # Hematuria with Bladder Mass   - CT AP with hyperattenuating mass within dome of the bladder that protrudes into the blader lumen 3.6 x 3.4 cm consistent for possible bladder neoplasm.   - Initially no hematuria and Urology consult recommended outpatient follow up with Private Urologist.   - Monitor hematuria. IF continues and large quantity/ unresolved, then reconsult Urology.     # BL Lung Mass  - CHEST CHEST w/ BL pulmonary nodular opacities/ masses likely with metastatic disease.   - Patient s/p L Lung Nodule in 7/2019 noted to be inconclusive second to poor tissue sample. Patient is a long time smoker with concern for lung nodule to be cancerous.   - Wife to bring in old CT LUNG imaging for comparison. Unlikely for biopsy during this is admission, but bx likely to be planned for outpatient.     # Current Smoker   - Nicotine patch   - Smoking cessation counselling.     # OTHER HISTORY   - HTN: COntinue on Norvasc in house.   - DM2: Hold off on ISS for now. Monitor as diet modifications outpatient. Check A1C.     # DVT PPX with Lovenox QD     Alva Cotton PA-C   Department of Medicine   [facility]   In House Pager [phone]/ [phone] Mr. Gomez is a 78 YO M with PMHx of COPD, HTN, DM2, HLD, Left Lung Nodule s/p CT and Lung Biopsy 7/2019 (non-diagnostic/ inconclusive per patient d/t "not enough tissue"), s/p surveillance CT CHEST on Wednesday (1/9) and was found incidentally to have R PTX. Patient states that he was advised to come to the hospital due to CT findings. He noticed increased dyspnea from baseline. In the ED, patient was sating 91% on RA, hemodynamically stable; admitted to RCU. CT CHEST found with unchanged right sided PTX in house. s/p R PTC placement 1/11 to LWS for now.   # R Spontaneous PTX   - Outpatient CT CHEST on 1/9 with R PTX and bullous emphysematous changes.   - CT CHEST inpatient unchanged R PTX. No signs of tension.   - s/p R PTC 1/11 to LWS changed to H20 seal 1/12 then clamped 1/13.   - Continue on O2 supplementation.   - QD CXR to monitor resolution of PTX   - Chest tube removed 1/14    # Hematuria with Bladder Mass   - CT AP with hyperattenuating mass within dome of the bladder that protrudes into the blader lumen 3.6 x 3.4 cm consistent for possible bladder neoplasm.   - Initially no hematuria and Urology consult recommended outpatient follow up with Private Urologist.   - Monitor hematuria. IF continues and large quantity/ unresolved, then reconsult Urology.     # BL Lung Mass  - CHEST CHEST w/ BL pulmonary nodular opacities/ masses likely with metastatic disease.   - Patient s/p L Lung Nodule in 7/2019 noted to be inconclusive second to poor tissue sample. Patient is a long time smoker with concern for lung nodule to be cancerous.   - Wife to bring in old CT LUNG imaging for comparison. Unlikely for biopsy during this is admission, but bx likely to be planned for outpatient.     # Current Smoker   - Nicotine patch   - Smoking cessation counselling.     # OTHER HISTORY   - HTN: COntinue on Norvasc in house.   - DM2: Hold off on ISS for now. Monitor as diet modifications outpatient. Check A1C.     # DVT PPX with Lovenox QD     Demi Noriega PA-C   Department of Medicine   [facility]   In House Pager [phone]

## 2020-01-14 NOTE — DISCHARGE NOTE PROVIDER - NSDCMRMEDTOKEN_GEN_ALL_CORE_FT
Incruse Ellipta 62.5 mcg/inh inhalation powder: 1  inhaled once a day  oxyCODONE-acetaminophen 5 mg-325 mg oral tablet: 1-2 tab(s) orally every 4 to 6 hours, As Needed -for severe pain MDD:6 tabs  Vitamin B-12 1000 mcg oral tablet: 1 tab(s) orally once a day  Vitamin D3 2000 intl units oral capsule: 1 cap(s) orally once a day  Welchol 625 mg oral tablet: 1 tab(s) orally once a day, As Needed albuterol 90 mcg/inh inhalation aerosol: 2 puff(s) inhaled every 4 hours, As Needed -for shortness of breath and/or wheezing   amLODIPine 5 mg oral tablet: 1 tab(s) orally once a day  budesonide-formoterol 160 mcg-4.5 mcg/inh inhalation aerosol: 2 puff(s) inhaled 2 times a day   Incruse Ellipta 62.5 mcg/inh inhalation powder: 1  inhaled once a day  nicotine 14 mg/24 hr transdermal film, extended release: 1 patch transdermal once a day   Vitamin B-12 1000 mcg oral tablet: 1 tab(s) orally once a day  Vitamin D3 2000 intl units oral capsule: 1 cap(s) orally once a day  Welchol 625 mg oral tablet: 1 tab(s) orally once a day, As Needed

## 2020-01-15 ENCOUNTER — TRANSCRIPTION ENCOUNTER (OUTPATIENT)
Age: 80
End: 2020-01-15

## 2020-01-15 VITALS
TEMPERATURE: 98 F | DIASTOLIC BLOOD PRESSURE: 75 MMHG | SYSTOLIC BLOOD PRESSURE: 111 MMHG | HEART RATE: 91 BPM | OXYGEN SATURATION: 92 % | RESPIRATION RATE: 18 BRPM

## 2020-01-15 PROCEDURE — 99238 HOSP IP/OBS DSCHRG MGMT 30/<: CPT

## 2020-01-15 RX ORDER — NICOTINE POLACRILEX 2 MG
1 GUM BUCCAL
Qty: 1 | Refills: 0
Start: 2020-01-15

## 2020-01-15 RX ORDER — AMLODIPINE BESYLATE 2.5 MG/1
1 TABLET ORAL
Qty: 30 | Refills: 0
Start: 2020-01-15 | End: 2020-02-13

## 2020-01-15 RX ORDER — BUDESONIDE AND FORMOTEROL FUMARATE DIHYDRATE 160; 4.5 UG/1; UG/1
2 AEROSOL RESPIRATORY (INHALATION)
Qty: 1 | Refills: 0
Start: 2020-01-15

## 2020-01-15 RX ORDER — ALBUTEROL 90 UG/1
2 AEROSOL, METERED ORAL
Qty: 1 | Refills: 0
Start: 2020-01-15 | End: 2020-02-13

## 2020-01-15 RX ADMIN — Medication 1 PATCH: at 11:08

## 2020-01-15 RX ADMIN — CHLORHEXIDINE GLUCONATE 1 APPLICATION(S): 213 SOLUTION TOPICAL at 11:07

## 2020-01-15 RX ADMIN — Medication 1 PATCH: at 11:07

## 2020-01-15 RX ADMIN — AMLODIPINE BESYLATE 5 MILLIGRAM(S): 2.5 TABLET ORAL at 06:16

## 2020-01-15 RX ADMIN — PREGABALIN 1000 MICROGRAM(S): 225 CAPSULE ORAL at 11:07

## 2020-01-15 RX ADMIN — ENOXAPARIN SODIUM 40 MILLIGRAM(S): 100 INJECTION SUBCUTANEOUS at 11:07

## 2020-01-15 RX ADMIN — Medication 2000 UNIT(S): at 11:07

## 2020-01-15 RX ADMIN — Medication 1 PATCH: at 07:21

## 2020-01-15 RX ADMIN — TIOTROPIUM BROMIDE 1 CAPSULE(S): 18 CAPSULE ORAL; RESPIRATORY (INHALATION) at 09:46

## 2020-01-15 RX ADMIN — BUDESONIDE AND FORMOTEROL FUMARATE DIHYDRATE 2 PUFF(S): 160; 4.5 AEROSOL RESPIRATORY (INHALATION) at 09:46

## 2020-01-15 NOTE — PROGRESS NOTE ADULT - SUBJECTIVE AND OBJECTIVE BOX
CHIEF COMPLAINT: Patient is a 79y old  Male who presents with a chief complaint of sob, pneumothorax (14 Jan 2020 17:41)    Interval Events:      REVIEW OF SYSTEMS:  Constitutional:   Eyes:  ENT:  CV:  Resp:  GI:  :  MSK:  Integumentary:  Neurological:  Psychiatric:  Endocrine:  Hematologic/Lymphatic:  Allergic/Immunologic:  [ ] All other systems negative  [ ] Unable to assess ROS because ________      OBJECTIVE:  ICU Vital Signs Last 24 Hrs  T(C): 37.1 (15 Robert 2020 06:14), Max: 37.1 (15 Robert 2020 06:14)  T(F): 98.7 (15 Robert 2020 06:14), Max: 98.7 (15 Robert 2020 06:14)  HR: 68 (15 Robert 2020 06:14) (68 - 90)  BP: 138/78 (15 Robert 2020 06:14) (115/62 - 138/78)  BP(mean): --  ABP: --  ABP(mean): --  RR: 16 (15 Robert 2020 06:14) (16 - 17)  SpO2: 96% (15 Robert 2020 06:14) (95% - 97%)    01-14 @ 07:01  -  01-15 @ 07:00  --------------------------------------------------------  IN: 0 mL / OUT: 100 mL / NET: -100 mL    HOSPITAL MEDICATIONS:  MEDICATIONS  (STANDING):  amLODIPine   Tablet 5 milliGRAM(s) Oral daily  budesonide 160 MICROgram(s)/formoterol 4.5 MICROgram(s) Inhaler 2 Puff(s) Inhalation two times a day  chlorhexidine 4% Liquid 1 Application(s) Topical daily  cholecalciferol 2000 Unit(s) Oral daily  cyanocobalamin 1000 MICROGram(s) Oral daily  enoxaparin Injectable 40 milliGRAM(s) SubCutaneous daily  influenza  Vaccine (HIGH DOSE) 0.5 milliLiter(s) IntraMuscular once  lidocaine 1% Injectable 30 milliLiter(s) Local Injection once  nicotine -  14 mG/24Hr(s) Patch 1 patch Transdermal daily  tiotropium 18 MICROgram(s) Capsule 1 Capsule(s) Inhalation daily    MEDICATIONS  (PRN):  acetaminophen   Tablet .. 650 milliGRAM(s) Oral every 6 hours PRN Mild Pain (1 - 3), Moderate Pain (4 - 6)  ALBUTerol    0.083% 2.5 milliGRAM(s) Nebulizer every 6 hours PRN Shortness of Breath and/or Wheezing      LABS:                    MICROBIOLOGY:     RADIOLOGY:  [ ] Reviewed and interpreted by me    PULMONARY FUNCTION TESTS:    EKG: CHIEF COMPLAINT: Patient is a 79y old  Male who presents with a chief complaint of sob, pneumothorax (14 Jan 2020 17:41)    Interval Events: none overnight - chest tube removed yesterday, CXR ok      REVIEW OF SYSTEMS:  Constitutional: no complaints   CV: denies    Resp: denies   GI: denies   [x] All other systems negative  [ ] Unable to assess ROS because ________      OBJECTIVE:  ICU Vital Signs Last 24 Hrs  T(C): 37.1 (15 Robert 2020 06:14), Max: 37.1 (15 Robert 2020 06:14)  T(F): 98.7 (15 Robert 2020 06:14), Max: 98.7 (15 Robert 2020 06:14)  HR: 68 (15 Robert 2020 06:14) (68 - 90)  BP: 138/78 (15 Robert 2020 06:14) (115/62 - 138/78)  BP(mean): --  ABP: --  ABP(mean): --  RR: 16 (15 Robert 2020 06:14) (16 - 17)  SpO2: 96% (15 Robert 2020 06:14) (95% - 97%)    01-14 @ 07:01  -  01-15 @ 07:00  --------------------------------------------------------  IN: 0 mL / OUT: 100 mL / NET: -100 mL    HOSPITAL MEDICATIONS:  MEDICATIONS  (STANDING):  amLODIPine   Tablet 5 milliGRAM(s) Oral daily  budesonide 160 MICROgram(s)/formoterol 4.5 MICROgram(s) Inhaler 2 Puff(s) Inhalation two times a day  chlorhexidine 4% Liquid 1 Application(s) Topical daily  cholecalciferol 2000 Unit(s) Oral daily  cyanocobalamin 1000 MICROGram(s) Oral daily  enoxaparin Injectable 40 milliGRAM(s) SubCutaneous daily  influenza  Vaccine (HIGH DOSE) 0.5 milliLiter(s) IntraMuscular once  lidocaine 1% Injectable 30 milliLiter(s) Local Injection once  nicotine -  14 mG/24Hr(s) Patch 1 patch Transdermal daily  tiotropium 18 MICROgram(s) Capsule 1 Capsule(s) Inhalation daily    MEDICATIONS  (PRN):  acetaminophen   Tablet .. 650 milliGRAM(s) Oral every 6 hours PRN Mild Pain (1 - 3), Moderate Pain (4 - 6)  ALBUTerol    0.083% 2.5 milliGRAM(s) Nebulizer every 6 hours PRN Shortness of Breath and/or Wheezing

## 2020-01-15 NOTE — PROGRESS NOTE ADULT - PROBLEM SELECTOR PLAN 1
- outpatient CT chest on 1/9 with right side ptx and bullous emphysematous changes  - inpatient CT chest unchanged   - s/p right chest tube 1/11, now removed since 1/14  - post CXR stable   - cont to monitor

## 2020-01-15 NOTE — PROGRESS NOTE ADULT - PROBLEM SELECTOR PLAN 3
- CT chest with BL pulmonary nodular opacities/ masses likely with metastatic disease  - s/p L lung nodule in 7/2019 noted to be inconclusive second to poor tissue sample  - outpatient follow up for repeat bx

## 2020-01-15 NOTE — DISCHARGE NOTE NURSING/CASE MANAGEMENT/SOCIAL WORK - PATIENT PORTAL LINK FT
You can access the FollowMyHealth Patient Portal offered by [facility] by registering at the following website: [email or web address]. By joining [employer]’s FollowMyHealth portal, you will also be able to view your health information using other applications (apps) compatible with our system.

## 2020-01-15 NOTE — CHART NOTE - NSCHARTNOTEFT_GEN_A_CORE
Pt with history of COPD - room air at rest saturation 90%.  Room air ambulation 88% - improves to 94% on 2L oxygen - will require home o2.

## 2020-01-15 NOTE — PROGRESS NOTE ADULT - ATTENDING COMMENTS
severe emphysema, smoker, known left sided lung mass (?previous non-diag ct guided bx), sent in for admission for an incidentally noted right sided PTX seen on CT done to follow up the left sided masses. Pt asymptomatic at the time  This am, chest tube to water seal. no leak  Clamped, f/u CXR
CXR stable  will pull chest tube  importance of f/u with his pulm and pmd stressed
Pt seen and examined. 79 M current heavy smoker with extensive medical history as above including severe bullous emphysema, now with acute resp failure with hypoxia and enlarging R pneumothorax with development of tension. Emergent R chest placed at bedside for decompression. Currently with chest tube to low wall suction. FUP CXR. Now with stable hemodynamics. CT chest/ abd/ pelvis with b/l lung masses suspicious for mets along with a large bladder mass. Urology evalv for cystoscopy/ bx. Pt and family updated in detail regarding the plan of care at bedside.
Pt seen and examined. R chest tube placed for tension pneumothorax yesterday. Lung reinflated with residual apical PTX on chest x-ray. Thoracic POCUS showing presence of lung sliding at the lung apex. Place chest tube to water seal today. Repeat CXR in am. B/L lung masses on CT chest. Likely metastatic disease. Seen by urology, no plan for inpt cystoscopy/ bladder mass Bx. Pt and wife updated on plan of care at bedside.
CXR stable  No PTX  stable for d/c  pt desaturates to 88% ra with ambulation  home O2 recommended, pt however refuses  he will follow up with his pulmologist as an outpt

## 2020-01-15 NOTE — PROGRESS NOTE ADULT - PROBLEM SELECTOR PLAN 4
- CTAP with hyperattenuating mass within dome of the bladder that protrudes into the blader lumen 3.6 x 3.4 cm consistent for possible bladder neoplasm  - also with hematuria - cont to monitor

## 2020-01-15 NOTE — DISCHARGE NOTE NURSING/CASE MANAGEMENT/SOCIAL WORK - NSDCFUADDAPPT_GEN_ALL_CORE_FT
Please follow up with Dr. Aleman within 1 week of discharge from the hospital.     Please follow up with your primary care provider within 1 week of discharge from the hospital. If you do not have a primary care provider please follow up with the [facility], call [phone]

## 2020-01-15 NOTE — DISCHARGE NOTE NURSING/CASE MANAGEMENT/SOCIAL WORK - NSDCPEEMAIL_GEN_ALL_CORE
[facility] email [email or web address]

## 2020-01-15 NOTE — PROGRESS NOTE ADULT - PROBLEM SELECTOR PLAN 2
- longtime hx of COPD 2/2 active smoker  - cont nebs, symbicort, spiriva  - supplemental o2 as tolerated - will require home o2

## 2020-01-15 NOTE — PROGRESS NOTE ADULT - ASSESSMENT
Mr. Gomez is a 80 YO M with PMHx of COPD, HTN, DM2, HLD, Left Lung Nodule s/p CT and Lung Biopsy 7/2019 (non-diagnostic/ inconclusive per patient d/t "not enough tissue"), s/p surveillance CT CHEST on Wednesday (1/9) and was found incidentally to have R PTX.

## 2020-01-15 NOTE — DISCHARGE NOTE NURSING/CASE MANAGEMENT/SOCIAL WORK - NSDCPEWEB_GEN_ALL_CORE
[place] website --- [email or web address] website --- [email or web address]

## 2020-03-19 NOTE — DISCHARGE NOTE NURSING/CASE MANAGEMENT/SOCIAL WORK - HAS THE PATIENT USED TOBACCO IN THE PAST 30 DAYS?
Health Maintenance Due   Topic Date Due   • Shingles Vaccine (1 of 2) 08/29/2004   • Colorectal Cancer Screening-Fecal Occult Blood  09/24/2016   • Osteoporosis Screening  08/29/2019   • Medicare Wellness 65+  08/29/2019       Patient is due for topics as listed above but is not proceeding with Immunization(s) Shingles, Colorectal Cancer Screening: iFOBT and MWV (Medicare Wellness Visit) at this time.          Yes

## 2021-04-26 NOTE — H&P ADULT - PROBLEM SELECTOR PLAN 7
2021  EMPLOYEE INFORMATION: EMPLOYER INFORMATION:   NAME: Jt PHILLIPS    : 1999 [phone]    DATE OF INJURY/EVENT: 2021            Location: [facility]   Treating Provider: MARCELO Mendoza  Time In:  8:33 AM Time Out:  9:44 AM      DIAGNOSIS:   1. Crushing injury of finger of left hand      STATUS: This injury is determined to be WORK RELATED.    RETURN TO WORK:Employee may return to work with restrictions.                RESTRICTIONS:  Restrictions are to be followed at work and at home.  Restrictions are in effect until next follow-up visit.    1.  Not to lift above chest height  2.  Lift only close to the body  3.  Not to use Left hand, otherwise may move left arm as tolerated.  4.  Discontinue any activities that increase symptomatology.  5. Keep wound clean, dry, and covered while in the workplace. Do not submerge wound bed in water.        TREATMENT PLAN:   Medications for this injury/condition:  1.  Naproxen (Aleve) 1 tablet every 12 hours by mouth with food as needed for pain.  Patient is advised to use routinely over the next week to gain control of pain and then may transition to as needed.         Referral/Consult: None  Diagnostic Testing: XR HAND 3+ VIEW LEFT       Instructions:  1. Buddy tape is applied to the middle and fourth digits to brace fracture of the distal tip of the fourth finger.  Please keep these on at all times as the area heals to ensure proper realignment. Rest and elevate the affected painful area.   2. Continue to monitor for signs of infection: redness, warmth, tenderness, pain, stiffness in the wounded extremity.  These are signs care must be sought in the clinic.   3. Tetanus booster vaccination is given to patient today at conclusion of today's of  fice visit.       NEXT RETURN VISIT:   May 3rd, 2021 at 1530  Thank you for the privilege of providing medical care for this injury/condition.  If there are  any questions, please call the occupational health clinic at Dept: [phone].    Electronically signed on 4/26/2021 at 9:44 AM by:   MARCELO Mendoza   [facility]    The physician below agrees with the plan and restrictions placed on the patient by the provider above.  Austin Go MD         c/w Vit B12, check B12 level

## 2021-04-28 NOTE — H&P ADULT - ASSESSMENT
CC:  Chandler Lawrence is here today for a complete physical exam office visit.      Medications: currently is not taking any medications    Refills needed today?  NO    denies Latex allergy or sensitivity    Tobacco history: verified    Advance Directives:  discussed and patient declined    Health Maintenance Due   Topic Date Due   • Depression Screening  Never done   • COVID-19 Vaccine (1) Never done       Patient is up to date, no discussion needed.                 79M with COPD, HTN, DM-2, hld, left lung nodule s/p CT and lung biopsy 7/2019 (non-diagnostic/inconclusive per pt d/t "not enough tissue"), who had surveillance CT chest on Wed (1/9), had CT on Wed and found to have pneumothorax, admitted for increasing dyspnea and pneumothorax

## 2021-05-12 ENCOUNTER — APPOINTMENT (OUTPATIENT)
Dept: ORTHOPEDIC SURGERY | Facility: CLINIC | Age: 81
End: 2021-05-12

## 2021-09-17 ENCOUNTER — APPOINTMENT (OUTPATIENT)
Dept: CT IMAGING | Facility: IMAGING CENTER | Age: 81
End: 2021-09-17
Payer: MEDICARE

## 2021-09-17 ENCOUNTER — OUTPATIENT (OUTPATIENT)
Dept: OUTPATIENT SERVICES | Facility: HOSPITAL | Age: 81
LOS: 1 days | End: 2021-09-17
Payer: MEDICARE

## 2021-09-17 DIAGNOSIS — C67.9 MALIGNANT NEOPLASM OF BLADDER, UNSPECIFIED: ICD-10-CM

## 2021-09-17 DIAGNOSIS — Z90.49 ACQUIRED ABSENCE OF OTHER SPECIFIED PARTS OF DIGESTIVE TRACT: Chronic | ICD-10-CM

## 2021-09-17 DIAGNOSIS — K59.00 CONSTIPATION, UNSPECIFIED: ICD-10-CM

## 2021-09-17 DIAGNOSIS — Z98.890 OTHER SPECIFIED POSTPROCEDURAL STATES: Chronic | ICD-10-CM

## 2021-09-17 DIAGNOSIS — Z98.49 CATARACT EXTRACTION STATUS, UNSPECIFIED EYE: Chronic | ICD-10-CM

## 2021-09-17 PROCEDURE — 71260 CT THORAX DX C+: CPT | Mod: 26,MH

## 2021-09-17 PROCEDURE — 71260 CT THORAX DX C+: CPT | Mod: MH

## 2021-09-17 PROCEDURE — 74178 CT ABD&PLV WO CNTR FLWD CNTR: CPT | Mod: 26,MH

## 2021-09-17 PROCEDURE — 82565 ASSAY OF CREATININE: CPT

## 2021-09-17 PROCEDURE — 74178 CT ABD&PLV WO CNTR FLWD CNTR: CPT | Mod: MH

## 2021-11-14 ENCOUNTER — INPATIENT (INPATIENT)
Facility: HOSPITAL | Age: 81
LOS: 3 days | Discharge: HOME HEALTH | End: 2021-11-18
Attending: INTERNAL MEDICINE | Admitting: INTERNAL MEDICINE
Payer: MEDICARE

## 2021-11-14 VITALS
DIASTOLIC BLOOD PRESSURE: 63 MMHG | TEMPERATURE: 99 F | HEART RATE: 119 BPM | HEIGHT: 72 IN | OXYGEN SATURATION: 72 % | RESPIRATION RATE: 24 BRPM | SYSTOLIC BLOOD PRESSURE: 119 MMHG

## 2021-11-14 DIAGNOSIS — Z90.49 ACQUIRED ABSENCE OF OTHER SPECIFIED PARTS OF DIGESTIVE TRACT: Chronic | ICD-10-CM

## 2021-11-14 DIAGNOSIS — Z98.49 CATARACT EXTRACTION STATUS, UNSPECIFIED EYE: Chronic | ICD-10-CM

## 2021-11-14 DIAGNOSIS — Z98.890 OTHER SPECIFIED POSTPROCEDURAL STATES: Chronic | ICD-10-CM

## 2021-11-14 PROCEDURE — 71045 X-RAY EXAM CHEST 1 VIEW: CPT | Mod: 26

## 2021-11-14 PROCEDURE — 99291 CRITICAL CARE FIRST HOUR: CPT | Mod: CS

## 2021-11-14 RX ORDER — IPRATROPIUM/ALBUTEROL SULFATE 18-103MCG
3 AEROSOL WITH ADAPTER (GRAM) INHALATION ONCE
Refills: 0 | Status: COMPLETED | OUTPATIENT
Start: 2021-11-14 | End: 2021-11-14

## 2021-11-14 RX ORDER — SODIUM CHLORIDE 9 MG/ML
500 INJECTION INTRAMUSCULAR; INTRAVENOUS; SUBCUTANEOUS ONCE
Refills: 0 | Status: COMPLETED | OUTPATIENT
Start: 2021-11-14 | End: 2021-11-14

## 2021-11-14 RX ORDER — VANCOMYCIN HCL 1 G
1000 VIAL (EA) INTRAVENOUS ONCE
Refills: 0 | Status: COMPLETED | OUTPATIENT
Start: 2021-11-14 | End: 2021-11-14

## 2021-11-14 RX ORDER — CEFEPIME 1 G/1
2000 INJECTION, POWDER, FOR SOLUTION INTRAMUSCULAR; INTRAVENOUS ONCE
Refills: 0 | Status: COMPLETED | OUTPATIENT
Start: 2021-11-14 | End: 2021-11-14

## 2021-11-14 RX ADMIN — Medication 250 MILLIGRAM(S): at 23:49

## 2021-11-14 RX ADMIN — SODIUM CHLORIDE 500 MILLILITER(S): 9 INJECTION INTRAMUSCULAR; INTRAVENOUS; SUBCUTANEOUS at 23:49

## 2021-11-14 NOTE — ED PROVIDER NOTE - PHYSICAL EXAMINATION
Gen: In mild distress. A&Ox4. Non-toxic appearing.  HEENT: Normocephalic and atraumatic. PERRL, EOMI, no nasal discharge, mucous membranes dry, no scleral icterus.  CV: NSR. Tachycardic to 100s. S1/S2, no M/R/G. No significant lower extremity edema. Radial and DP pulses present and symmetrical. Capillary refill less than 2 seconds.  Resp: Increased effort and rate. CTAB, no rales, rhonchi, or wheezes. O2 sat on RA was 72%. Placed on high flow, now satting 96%.   GI: Abdomen soft, non-distended, non tender to palpation. No masses appreciated. Bowel sounds present.  MSK: No open wounds and no bruising. No CVAT bilaterally.  Neuro: Following commands, speaking in full sentences, moving extremities spontaneously  Psych: Appropriate mood, cooperative

## 2021-11-14 NOTE — ED PROVIDER NOTE - OBJECTIVE STATEMENT
The patient is a 80y Male with pmhx of bladder cancer, HTN, DM2, emphysema, left lung nodule, HLD complaining of sob. In triage, O2 sat on RA was 72%. O2 sat was 83% on 4L NC. Pt switched to NRB at 15L/min 100% O2. Oxygenation improved. Pt's last chemo treatment for bladder cancer was two days ago (11/12/21). Pt states that he didn't notice any adverse reactions or symptoms after his chemo treatment. Pt says he started to feel SOB two days ago, has progressively worsened. Denies any cp, abd pain, leg swelling, n/v/d. Per wife, pt was febrile earlier this week with Tmax of 101F. Respiratory at bedside switched pt to high flow O2 as pt was still desatting on NRB. Wife - Sotero [phone]. Allergic to PCN and Cefobid. The patient is a 80y Male with pmhx of bladder cancer, HTN, DM2, emphysema, left lung nodule, HLD complaining of sob. In triage, O2 sat on RA was 72%. O2 sat was 83% on 4L NC. Pt switched to NRB at 15L/min 100% O2. Oxygenation improved. Pt's last chemo treatment for bladder cancer was two days ago (11/12/21). Pt states that he didn't notice any adverse reactions or symptoms after his chemo treatment. Pt says he started to feel SOB two days ago, has progressively worsened. Denies any cp, abd pain, leg swelling, n/v/d. Per wife, pt was febrile earlier this week with Tmax of 101F. Respiratory at bedside switched pt to high flow O2 as pt was desatting on NRB. Wife - Sotero [phone]. Allergic to PCN and Cefobid.

## 2021-11-14 NOTE — ED PROVIDER NOTE - NS ED ROS FT
GENERAL: +fever  EYES: No change in vision  HEENT: +facial swelling  CARDIAC: No chest pain  PULMONARY: +SOB  GI: No abdominal pain, no nausea or no vomiting, no diarrhea or constipation  : No changes in urination  SKIN: No rashes  NEURO: No headache, no numbness  MSK: No joint pain  Otherwise as HPI or negative.

## 2021-11-14 NOTE — ED PROVIDER NOTE - CLINICAL SUMMARY MEDICAL DECISION MAKING FREE TEXT BOX
Daniel Levin MD (PGY-2): The patient is a 80y Male with pmhx of bladder cancer, HTN, DM2, emphysema, left lung nodule, HLD complaining of sob. SOB and hypoxia most likely due to sepsis, PE, or COPD exacerbation. Sepsis workup. IVF, abx, labs, DuoNeb, ekg, cxr, cta chest. Will continue to monitor pt on high flow, will escalate to bipap as needed. Pt will be admitted to Medicine.

## 2021-11-14 NOTE — ED ADULT NURSE NOTE - CHIEF COMPLAINT QUOTE
hem-onc pt on chemo -sob    pt has hx of bladder cancer.. last chemo was friday.  c/o facial swelling and sob since friday.  diet controlled diabetes (type 2)- no meds.  wife- renate [phone]. o2 sat on ra 72%.. placed on o2 via nc at 4l.. o2 sat 94%

## 2021-11-14 NOTE — ED PROVIDER NOTE - ATTENDING CONTRIBUTION TO CARE
81 YO M with PMHx of COPD, HTN, DM2, HLD, Left Lung Nodule s/p CT and Lung Biopsy 7/2019, bladder ca on chemo, last chemo 2 days ago pw fevers, sob, cough, no chest pain, calin bd pain, no nausea, vomiting,+ leg swelling, ho dvt not on ac  pt arrives in no distress but sats 81% on NC, 100% on NRB 15L, lungs clear, tachy, febrile, will treat empirically, cxr, CTA, high marcel  admit  Upon my evaluation, this patient had a high probability of imminent or life-threatening deterioration due to _hypoxic resp failure________, which required my direct attention, intervention, and personal management.  The patient has a  medical condition that impairs one or more vital organ systems.  Frequent personal assessment and adjustment of medical interventions was performed.      I have personally provided _60__ minutes of critical care time exclusive of time spent on separately billable procedures. Time includes review of laboratory data, radiology results, discussion with consultants, patient and family; monitoring for potential decompensation, as well as time spent retrieving data and reviewing the chart and documenting the visit. Interventions were performed as documented above.

## 2021-11-14 NOTE — ED ADULT TRIAGE NOTE - CHIEF COMPLAINT QUOTE
hem-onc pt on chemo -sob    pt has hx of bladder cancer.. last chemo was friday.  c/o facial swelling and sob since friday.  diet controlled diabetes (type 2)- no meds.  wife- renate [phone]. o2 sat on ra 72%.. placed on o2 via nc at 4l.. o2 sat 94%

## 2021-11-14 NOTE — ED ADULT NURSE NOTE - OBJECTIVE STATEMENT
Patient presenting to  17. Patient complaining of SOB. Breathing labored and even. Patient AAOX4. No pallor or diaphoresis noted. Upon assessment oxygen saturation 83% on 4L NC. Patient switched to NRB at 15L/min 100% O2. Oxygenation improved. Lung sounds clear on auscultation. Patient medical history of bladder cancer. Last chemo treatment was Friday. Patient stated he noted no adverse reactions or symptoms after treatment. Patient explains that his started to feel SOB and that is why he came to the ER. Fever present at home 101- last week. Respiratory at bedside to switch to high flow O2. Patient currently febrile. Swelling noted to left lower extremity. #20g started to R forearm. Labs drawn and sent as per MD order. Medications administered as per MD order. Sinus tachycardia on monitor. Will continue to monitor.

## 2021-11-14 NOTE — ED ADULT NURSE NOTE - NSIMPLEMENTINTERV_GEN_ALL_ED
Implemented All Fall with Harm Risk Interventions:  [place] to call system. Call bell, personal items and telephone within reach. Instruct patient to call for assistance. Room bathroom lighting operational. Non-slip footwear when patient is off stretcher. Physically safe environment: no spills, clutter or unnecessary equipment. Stretcher in lowest position, wheels locked, appropriate side rails in place. Provide visual cue, wrist band, yellow gown, etc. Monitor gait and stability. Monitor for mental status changes and reorient to person, place, and time. Review medications for side effects contributing to fall risk. Reinforce activity limits and safety measures with patient and family. Provide visual clues: red socks.

## 2021-11-15 DIAGNOSIS — A41.9 SEPSIS, UNSPECIFIED ORGANISM: ICD-10-CM

## 2021-11-15 DIAGNOSIS — N17.9 ACUTE KIDNEY FAILURE, UNSPECIFIED: ICD-10-CM

## 2021-11-15 DIAGNOSIS — R06.00 DYSPNEA, UNSPECIFIED: ICD-10-CM

## 2021-11-15 DIAGNOSIS — D53.9 NUTRITIONAL ANEMIA, UNSPECIFIED: ICD-10-CM

## 2021-11-15 DIAGNOSIS — J96.01 ACUTE RESPIRATORY FAILURE WITH HYPOXIA: ICD-10-CM

## 2021-11-15 DIAGNOSIS — R77.8 OTHER SPECIFIED ABNORMALITIES OF PLASMA PROTEINS: ICD-10-CM

## 2021-11-15 DIAGNOSIS — E55.9 VITAMIN D DEFICIENCY, UNSPECIFIED: ICD-10-CM

## 2021-11-15 DIAGNOSIS — R09.89 OTHER SPECIFIED SYMPTOMS AND SIGNS INVOLVING THE CIRCULATORY AND RESPIRATORY SYSTEMS: ICD-10-CM

## 2021-11-15 DIAGNOSIS — Z51.5 ENCOUNTER FOR PALLIATIVE CARE: ICD-10-CM

## 2021-11-15 DIAGNOSIS — J43.9 EMPHYSEMA, UNSPECIFIED: ICD-10-CM

## 2021-11-15 DIAGNOSIS — Z29.9 ENCOUNTER FOR PROPHYLACTIC MEASURES, UNSPECIFIED: ICD-10-CM

## 2021-11-15 DIAGNOSIS — R79.89 OTHER SPECIFIED ABNORMAL FINDINGS OF BLOOD CHEMISTRY: ICD-10-CM

## 2021-11-15 DIAGNOSIS — C67.9 MALIGNANT NEOPLASM OF BLADDER, UNSPECIFIED: ICD-10-CM

## 2021-11-15 DIAGNOSIS — Z71.89 OTHER SPECIFIED COUNSELING: ICD-10-CM

## 2021-11-15 LAB
ALBUMIN SERPL ELPH-MCNC: 3.2 G/DL — LOW (ref 3.3–5)
ALP SERPL-CCNC: 62 U/L — SIGNIFICANT CHANGE UP (ref 40–120)
ALT FLD-CCNC: 18 U/L — SIGNIFICANT CHANGE UP (ref 4–41)
ANION GAP SERPL CALC-SCNC: 11 MMOL/L — SIGNIFICANT CHANGE UP (ref 7–14)
ANISOCYTOSIS BLD QL: SIGNIFICANT CHANGE UP
APPEARANCE UR: ABNORMAL
APTT BLD: 30.5 SEC — SIGNIFICANT CHANGE UP (ref 27–36.3)
AST SERPL-CCNC: 18 U/L — SIGNIFICANT CHANGE UP (ref 4–40)
B PERT DNA SPEC QL NAA+PROBE: SIGNIFICANT CHANGE UP
B PERT+PARAPERT DNA PNL SPEC NAA+PROBE: SIGNIFICANT CHANGE UP
BACTERIA # UR AUTO: NEGATIVE — SIGNIFICANT CHANGE UP
BASE EXCESS BLDV CALC-SCNC: 0.1 MMOL/L — SIGNIFICANT CHANGE UP (ref -2–3)
BASOPHILS # BLD AUTO: 0 K/UL — SIGNIFICANT CHANGE UP (ref 0–0.2)
BASOPHILS NFR BLD AUTO: 0 % — SIGNIFICANT CHANGE UP (ref 0–2)
BILIRUB SERPL-MCNC: 0.7 MG/DL — SIGNIFICANT CHANGE UP (ref 0.2–1.2)
BILIRUB UR-MCNC: NEGATIVE — SIGNIFICANT CHANGE UP
BLOOD GAS ARTERIAL COMPREHENSIVE RESULT: SIGNIFICANT CHANGE UP
BLOOD GAS VENOUS COMPREHENSIVE RESULT: SIGNIFICANT CHANGE UP
BORDETELLA PARAPERTUSSIS (RAPRVP): SIGNIFICANT CHANGE UP
BUN SERPL-MCNC: 27 MG/DL — HIGH (ref 7–23)
C PNEUM DNA SPEC QL NAA+PROBE: SIGNIFICANT CHANGE UP
CALCIUM SERPL-MCNC: 8.7 MG/DL — SIGNIFICANT CHANGE UP (ref 8.4–10.5)
CHLORIDE BLDV-SCNC: 104 MMOL/L — SIGNIFICANT CHANGE UP (ref 96–108)
CHLORIDE SERPL-SCNC: 103 MMOL/L — SIGNIFICANT CHANGE UP (ref 98–107)
CO2 BLDV-SCNC: 29.1 MMOL/L — HIGH (ref 22–26)
CO2 SERPL-SCNC: 25 MMOL/L — SIGNIFICANT CHANGE UP (ref 22–31)
COLOR SPEC: SIGNIFICANT CHANGE UP
CREAT SERPL-MCNC: 1.34 MG/DL — HIGH (ref 0.5–1.3)
DIFF PNL FLD: NEGATIVE — SIGNIFICANT CHANGE UP
EOSINOPHIL # BLD AUTO: 0 K/UL — SIGNIFICANT CHANGE UP (ref 0–0.5)
EOSINOPHIL NFR BLD AUTO: 0 % — SIGNIFICANT CHANGE UP (ref 0–6)
EPI CELLS # UR: 2 /HPF — SIGNIFICANT CHANGE UP (ref 0–5)
FLUAV SUBTYP SPEC NAA+PROBE: SIGNIFICANT CHANGE UP
FLUBV RNA SPEC QL NAA+PROBE: SIGNIFICANT CHANGE UP
GAS PNL BLDV: 136 MMOL/L — SIGNIFICANT CHANGE UP (ref 136–145)
GIANT PLATELETS BLD QL SMEAR: PRESENT — SIGNIFICANT CHANGE UP
GLUCOSE BLDV-MCNC: 105 MG/DL — HIGH (ref 70–99)
GLUCOSE SERPL-MCNC: 110 MG/DL — HIGH (ref 70–99)
GLUCOSE UR QL: NEGATIVE — SIGNIFICANT CHANGE UP
HADV DNA SPEC QL NAA+PROBE: SIGNIFICANT CHANGE UP
HCO3 BLDV-SCNC: 27 MMOL/L — SIGNIFICANT CHANGE UP (ref 22–29)
HCOV 229E RNA SPEC QL NAA+PROBE: SIGNIFICANT CHANGE UP
HCOV HKU1 RNA SPEC QL NAA+PROBE: SIGNIFICANT CHANGE UP
HCOV NL63 RNA SPEC QL NAA+PROBE: SIGNIFICANT CHANGE UP
HCOV OC43 RNA SPEC QL NAA+PROBE: SIGNIFICANT CHANGE UP
HCT VFR BLD CALC: 34.8 % — LOW (ref 39–50)
HCT VFR BLDA CALC: 31 % — LOW (ref 39–51)
HGB BLD CALC-MCNC: 10.4 G/DL — LOW (ref 13–17)
HGB BLD-MCNC: 10.4 G/DL — LOW (ref 13–17)
HMPV RNA SPEC QL NAA+PROBE: SIGNIFICANT CHANGE UP
HPIV1 RNA SPEC QL NAA+PROBE: SIGNIFICANT CHANGE UP
HPIV2 RNA SPEC QL NAA+PROBE: SIGNIFICANT CHANGE UP
HPIV3 RNA SPEC QL NAA+PROBE: SIGNIFICANT CHANGE UP
HPIV4 RNA SPEC QL NAA+PROBE: SIGNIFICANT CHANGE UP
IANC: 6.92 K/UL — SIGNIFICANT CHANGE UP (ref 1.5–8.5)
INR BLD: 1.42 RATIO — HIGH (ref 0.88–1.16)
KETONES UR-MCNC: NEGATIVE — SIGNIFICANT CHANGE UP
LACTATE BLDV-MCNC: 3.1 MMOL/L — HIGH (ref 0.5–2)
LACTATE BLDV-MCNC: 3.2 MMOL/L — HIGH (ref 0.5–2)
LEUKOCYTE ESTERASE UR-ACNC: ABNORMAL
LYMPHOCYTES # BLD AUTO: 0.33 K/UL — LOW (ref 1–3.3)
LYMPHOCYTES # BLD AUTO: 4.5 % — LOW (ref 13–44)
M PNEUMO DNA SPEC QL NAA+PROBE: SIGNIFICANT CHANGE UP
MACROCYTES BLD QL: SLIGHT — SIGNIFICANT CHANGE UP
MAGNESIUM SERPL-MCNC: 2.1 MG/DL — SIGNIFICANT CHANGE UP (ref 1.6–2.6)
MANUAL SMEAR VERIFICATION: SIGNIFICANT CHANGE UP
MCHC RBC-ENTMCNC: 29.9 GM/DL — LOW (ref 32–36)
MCHC RBC-ENTMCNC: 30.1 PG — SIGNIFICANT CHANGE UP (ref 27–34)
MCV RBC AUTO: 100.6 FL — HIGH (ref 80–100)
MONOCYTES # BLD AUTO: 0.07 K/UL — SIGNIFICANT CHANGE UP (ref 0–0.9)
MONOCYTES NFR BLD AUTO: 0.9 % — LOW (ref 2–14)
NEUTROPHILS # BLD AUTO: 6.9 K/UL — SIGNIFICANT CHANGE UP (ref 1.8–7.4)
NEUTROPHILS NFR BLD AUTO: 92.8 % — HIGH (ref 43–77)
NITRITE UR-MCNC: NEGATIVE — SIGNIFICANT CHANGE UP
NRBC # BLD: 3 /100 — HIGH (ref 0–0)
NT-PROBNP SERPL-SCNC: 2751 PG/ML — HIGH
PCO2 BLDV: 57 MMHG — HIGH (ref 42–55)
PH BLDV: 7.29 — LOW (ref 7.32–7.43)
PH UR: 5.5 — SIGNIFICANT CHANGE UP (ref 5–8)
PHOSPHATE SERPL-MCNC: 3.1 MG/DL — SIGNIFICANT CHANGE UP (ref 2.5–4.5)
PLAT MORPH BLD: ABNORMAL
PLATELET # BLD AUTO: 437 K/UL — HIGH (ref 150–400)
PLATELET COUNT - ESTIMATE: NORMAL — SIGNIFICANT CHANGE UP
PO2 BLDV: 36 MMHG — SIGNIFICANT CHANGE UP
POIKILOCYTOSIS BLD QL AUTO: SLIGHT — SIGNIFICANT CHANGE UP
POLYCHROMASIA BLD QL SMEAR: SLIGHT — SIGNIFICANT CHANGE UP
POTASSIUM BLDV-SCNC: 5.2 MMOL/L — HIGH (ref 3.5–5.1)
POTASSIUM SERPL-MCNC: 4.5 MMOL/L — SIGNIFICANT CHANGE UP (ref 3.5–5.3)
POTASSIUM SERPL-SCNC: 4.5 MMOL/L — SIGNIFICANT CHANGE UP (ref 3.5–5.3)
PROCALCITONIN SERPL-MCNC: 0.17 NG/ML — HIGH (ref 0.02–0.1)
PROT SERPL-MCNC: 7 G/DL — SIGNIFICANT CHANGE UP (ref 6–8.3)
PROT UR-MCNC: ABNORMAL
PROTHROM AB SERPL-ACNC: 15.9 SEC — HIGH (ref 10.6–13.6)
RAPID RVP RESULT: SIGNIFICANT CHANGE UP
RBC # BLD: 3.46 M/UL — LOW (ref 4.2–5.8)
RBC # FLD: 20.9 % — HIGH (ref 10.3–14.5)
RBC BLD AUTO: ABNORMAL
RBC CASTS # UR COMP ASSIST: 2 /HPF — SIGNIFICANT CHANGE UP (ref 0–4)
RSV RNA SPEC QL NAA+PROBE: SIGNIFICANT CHANGE UP
RV+EV RNA SPEC QL NAA+PROBE: SIGNIFICANT CHANGE UP
SAO2 % BLDV: 60.1 % — SIGNIFICANT CHANGE UP
SARS-COV-2 RNA SPEC QL NAA+PROBE: SIGNIFICANT CHANGE UP
SODIUM SERPL-SCNC: 139 MMOL/L — SIGNIFICANT CHANGE UP (ref 135–145)
SP GR SPEC: 1.01 — SIGNIFICANT CHANGE UP (ref 1–1.05)
TROPONIN T, HIGH SENSITIVITY RESULT: 42 NG/L — SIGNIFICANT CHANGE UP
TROPONIN T, HIGH SENSITIVITY RESULT: 52 NG/L — HIGH
UROBILINOGEN FLD QL: SIGNIFICANT CHANGE UP
VARIANT LYMPHS # BLD: 1.8 % — SIGNIFICANT CHANGE UP (ref 0–6)
WBC # BLD: 7.44 K/UL — SIGNIFICANT CHANGE UP (ref 3.8–10.5)
WBC # FLD AUTO: 7.44 K/UL — SIGNIFICANT CHANGE UP (ref 3.8–10.5)
WBC UR QL: 33 /HPF — HIGH (ref 0–5)

## 2021-11-15 PROCEDURE — 71275 CT ANGIOGRAPHY CHEST: CPT | Mod: 26,QQ

## 2021-11-15 PROCEDURE — 71045 X-RAY EXAM CHEST 1 VIEW: CPT | Mod: 26

## 2021-11-15 PROCEDURE — 93970 EXTREMITY STUDY: CPT | Mod: 26

## 2021-11-15 PROCEDURE — 99358 PROLONG SERVICE W/O CONTACT: CPT

## 2021-11-15 PROCEDURE — 99223 1ST HOSP IP/OBS HIGH 75: CPT

## 2021-11-15 PROCEDURE — 99223 1ST HOSP IP/OBS HIGH 75: CPT | Mod: GC

## 2021-11-15 RX ORDER — NICOTINE POLACRILEX 2 MG
1 GUM BUCCAL DAILY
Refills: 0 | Status: DISCONTINUED | OUTPATIENT
Start: 2021-11-15 | End: 2021-11-18

## 2021-11-15 RX ORDER — HEPARIN SODIUM 5000 [USP'U]/ML
5000 INJECTION INTRAVENOUS; SUBCUTANEOUS EVERY 8 HOURS
Refills: 0 | Status: DISCONTINUED | OUTPATIENT
Start: 2021-11-15 | End: 2021-11-17

## 2021-11-15 RX ORDER — AZTREONAM 2 G
1000 VIAL (EA) INJECTION ONCE
Refills: 0 | Status: COMPLETED | OUTPATIENT
Start: 2021-11-15 | End: 2021-11-15

## 2021-11-15 RX ORDER — BUDESONIDE AND FORMOTEROL FUMARATE DIHYDRATE 160; 4.5 UG/1; UG/1
2 AEROSOL RESPIRATORY (INHALATION)
Refills: 0 | Status: DISCONTINUED | OUTPATIENT
Start: 2021-11-15 | End: 2021-11-18

## 2021-11-15 RX ORDER — UMECLIDINIUM 62.5 UG/1
1 AEROSOL, POWDER ORAL
Qty: 0 | Refills: 0 | DISCHARGE

## 2021-11-15 RX ORDER — SODIUM CHLORIDE 9 MG/ML
500 INJECTION INTRAMUSCULAR; INTRAVENOUS; SUBCUTANEOUS ONCE
Refills: 0 | Status: COMPLETED | OUTPATIENT
Start: 2021-11-15 | End: 2021-11-15

## 2021-11-15 RX ORDER — IPRATROPIUM/ALBUTEROL SULFATE 18-103MCG
3 AEROSOL WITH ADAPTER (GRAM) INHALATION EVERY 6 HOURS
Refills: 0 | Status: DISCONTINUED | OUTPATIENT
Start: 2021-11-15 | End: 2021-11-16

## 2021-11-15 RX ORDER — FUROSEMIDE 40 MG
80 TABLET ORAL ONCE
Refills: 0 | Status: COMPLETED | OUTPATIENT
Start: 2021-11-15 | End: 2021-11-15

## 2021-11-15 RX ORDER — FUROSEMIDE 40 MG
40 TABLET ORAL
Qty: 500 | Refills: 0 | Status: DISCONTINUED | OUTPATIENT
Start: 2021-11-15 | End: 2021-11-15

## 2021-11-15 RX ORDER — PREGABALIN 225 MG/1
1000 CAPSULE ORAL DAILY
Refills: 0 | Status: DISCONTINUED | OUTPATIENT
Start: 2021-11-15 | End: 2021-11-18

## 2021-11-15 RX ORDER — CHOLECALCIFEROL (VITAMIN D3) 125 MCG
1 CAPSULE ORAL
Qty: 0 | Refills: 0 | DISCHARGE

## 2021-11-15 RX ORDER — CEFEPIME 1 G/1
1000 INJECTION, POWDER, FOR SOLUTION INTRAMUSCULAR; INTRAVENOUS EVERY 8 HOURS
Refills: 0 | Status: DISCONTINUED | OUTPATIENT
Start: 2021-11-15 | End: 2021-11-16

## 2021-11-15 RX ADMIN — Medication 3 MILLILITER(S): at 16:00

## 2021-11-15 RX ADMIN — SODIUM CHLORIDE 500 MILLILITER(S): 9 INJECTION INTRAMUSCULAR; INTRAVENOUS; SUBCUTANEOUS at 05:03

## 2021-11-15 RX ADMIN — CEFEPIME 100 MILLIGRAM(S): 1 INJECTION, POWDER, FOR SOLUTION INTRAMUSCULAR; INTRAVENOUS at 13:23

## 2021-11-15 RX ADMIN — Medication 3 MILLILITER(S): at 00:15

## 2021-11-15 RX ADMIN — SODIUM CHLORIDE 500 MILLILITER(S): 9 INJECTION INTRAMUSCULAR; INTRAVENOUS; SUBCUTANEOUS at 02:29

## 2021-11-15 RX ADMIN — CEFEPIME 100 MILLIGRAM(S): 1 INJECTION, POWDER, FOR SOLUTION INTRAMUSCULAR; INTRAVENOUS at 23:15

## 2021-11-15 RX ADMIN — BUDESONIDE AND FORMOTEROL FUMARATE DIHYDRATE 2 PUFF(S): 160; 4.5 AEROSOL RESPIRATORY (INHALATION) at 23:15

## 2021-11-15 RX ADMIN — Medication 50 MILLIGRAM(S): at 01:27

## 2021-11-15 RX ADMIN — Medication 3 MILLILITER(S): at 22:20

## 2021-11-15 RX ADMIN — HEPARIN SODIUM 5000 UNIT(S): 5000 INJECTION INTRAVENOUS; SUBCUTANEOUS at 23:17

## 2021-11-15 RX ADMIN — Medication 80 MILLIGRAM(S): at 09:45

## 2021-11-15 RX ADMIN — Medication 3 MILLILITER(S): at 00:39

## 2021-11-15 RX ADMIN — Medication 3 MILLILITER(S): at 00:47

## 2021-11-15 RX ADMIN — HEPARIN SODIUM 5000 UNIT(S): 5000 INJECTION INTRAVENOUS; SUBCUTANEOUS at 13:24

## 2021-11-15 NOTE — H&P ADULT - HISTORY OF PRESENT ILLNESS
Mr. Gomez is a 80 year old man with bladder cancer (Dx: 01/2020, recurrence in 06/2021 on chemo C6 D3 Carboplatin, Gemcitabine), COPD (not on home O2), DVT (was on eliquis up through 03/2021 but was reportedly resolved so d/c'd) and potentially other problems (though he does not endorse any others and we have no outside records available) who presents with 4 days of subjective weakness, lower extremity swelling, shortness of breath, and one fever.     In the ED,      On the floor, Mr. Gomez is a 80 year old man with bladder cancer (Dx: 01/2020, recurrence in 06/2021 on chemo C6 D3 Carboplatin, Gemcitabine), COPD (not on home O2), DVT (was on eliquis up through 03/2021 but was reportedly resolved so d/c'd) and potentially other problems (though he does not endorse any others and we have no outside records available) who presents with 4 days of subjective weakness, lower extremity swelling, shortness of breath, and one fever. He says that this began after his last chemotherapy treatment; he's never had this with chemotherapy before, though he has had some fevers afterward before.    In the ED,  VS: T 98.6 |  | /74 | RR 28 | O2 100% on 15L NRB --> 95% on HFNC at 60%/40LPM  PEx: Tachycardic, CTAB w/o w/r/r  Labs: Hgb 10.4, WBC 7.44 | Cr 1.34  Imaging: CTPA w/o central PE or RHS; calcified nodular opacities / masses | New 1.5cm x 1.3cm RUL Nodular Opacity  ECG: NSR  Micro: RVP (-) | UA with Sterile Pyuria  Interventions: Cefepime 2g, Aztreonam 1g, Vanc 1g  Impression: Acute Hypoxic Respiratory Failure, ?Sepsis    On the floor, he says that he has  Mr. Gomez is a 80 year old man with bladder cancer (Dx: 01/2020, recurrence in 06/2021 on chemo C6 D3 Carboplatin, Gemcitabine), COPD (not on home O2), DVT (was on eliquis up through 03/2021 but was reportedly resolved so d/c'd) and potentially other problems (though he does not endorse any others and we have no outside records available) who presents with 4 days of subjective weakness, lower extremity swelling, shortness of breath, and one fever (TMax 101, responded to tylenol). He says that this began after his last chemotherapy treatment; he's never had this shortness of breath with chemotherapy before, though he has had some fevers afterward before.    Regarding his COPD, he continues to smoke about 1 pack per two weeks. He is prescribed Advair but does not use it.     In the ED,  VS: T 98.6 |  | /74 | RR 28 | O2 100% on 15L NRB --> 95% on HFNC at 60%/40LPM  PEx: Tachycardic, CTAB w/o w/r/r  Labs: Hgb 10.4, WBC 7.44 | Cr 1.34  Imaging: CTPA w/o central PE or RHS; calcified nodular opacities / masses | New 1.5cm x 1.3cm RUL Nodular Opacity  ECG: NSR  Micro: RVP (-) | UA with Sterile Pyuria  Interventions: Cefepime 2g, Aztreonam 1g, Vanc 1g  Impression: Acute Hypoxic Respiratory Failure, ?Sepsis

## 2021-11-15 NOTE — CONSULT NOTE ADULT - SUBJECTIVE AND OBJECTIVE BOX
HPI:  Mr. Gomez is a 80 year old man with bladder cancer (Dx: 2020, recurrence in 2021 on chemo C6 D3 Carboplatin, Gemcitabine), COPD (not on home O2), DVT (was on eliquis up through 2021 but was reportedly resolved so d/c'd) and potentially other problems (though he does not endorse any others and we have no outside records available) who presents with 4 days of subjective weakness, lower extremity swelling, shortness of breath, and one fever. He says that this began after his last chemotherapy treatment; he's never had this with chemotherapy before, though he has had some fevers afterward before.    In the ED,  VS: T 98.6 |  | /74 | RR 28 | O2 100% on 15L NRB --> 95% on HFNC at 60%/40LPM  PEx: Tachycardic, CTAB w/o w/r/r  Labs: Hgb 10.4, WBC 7.44 | Cr 1.34  Imaging: CTPA w/o central PE or RHS; calcified nodular opacities / masses | New 1.5cm x 1.3cm RUL Nodular Opacity  ECG: NSR  Micro: RVP (-) | UA with Sterile Pyuria  Interventions: Cefepime 2g, Aztreonam 1g, Vanc 1g  Impression: Acute Hypoxic Respiratory Failure, ?Sepsis    On the floor, he says that he has  (15 Nov 2021 06:55)    PERTINENT PM/SXH:   Hypertension  Emphysema of lung  Nodule of left lung  Vitamin D deficiency  S/P laparoscopic cholecystectomy  S/P left inguinal hernia repair  S/P cataract surgery    FAMILY HISTORY:  Family hx of hypertension  FH: diabetes mellitus    ITEMS NOT CHECKED ARE NOT PRESENT    SOCIAL HISTORY:   Significant other/partner[x ]  Children[ x]  Evangelical/Spirituality: Christian  Substance hx:  [ ]   Tobacco hx:  [ x]   Alcohol hx: [ ]   Home Opioid hx:  [ ] I-Stop Reference No: [id number]  Living Situation: [x ]Home  [ ]Long term care  [ ]Rehab [ ]Other    ADVANCE DIRECTIVES:    DNR  MOLST  [x ]  Living Will  [ ]   DECISION MAKER(s):  [ ] Health Care Proxy(s)  [x ] Surrogate(s)  [ ] Guardian           Name(s): Phone Number(s):  Wife Sotero Gomez: [phone]    BASELINE (I)ADL(s) (prior to admission):  [place]: [x ]Total  [ ] Moderate [ ]Dependent    Allergies    allergic to cefobid, unknown reaction (Other)  penicillin (Rash)    Intolerances    MEDICATIONS  (STANDING):  albuterol/ipratropium for Nebulization 3 milliLiter(s) Nebulizer every 6 hours  budesonide 160 MICROgram(s)/formoterol 4.5 MICROgram(s) Inhaler 2 Puff(s) Inhalation two times a day  cefepime   IVPB 1000 milliGRAM(s) IV Intermittent every 8 hours  cyanocobalamin 1000 MICROGram(s) Oral daily  heparin   Injectable 5000 Unit(s) SubCutaneous every 8 hours    MEDICATIONS  (PRN):  nicotine -  14 mG/24Hr(s) Patch 1 patch Transdermal daily PRN For Tobacco Cravings only      PRESENT SYMPTOMS: [ ]Unable to obtain due to poor mentation   Source if other than patient:  [ ]Family   [ ]Team     Pain: [ ]yes [x ]no  QOL impact -   Location -                    Aggravating factors -  Quality -  Radiation -  Timing-  Severity (0-10 scale):  Minimal acceptable level (0-10 scale):     CPOT:    https://www.[facility].org/getattachment/[email or web address]/Critical-Care-Pain-Observation-Tool-(CPOT)      PAIN AD Score:     http://geriatrictoolkit.[facility]/cog/painad.pdf (press ctrl +  left click to view)    Dyspnea:                           [x ]Mild [ ]Moderate [ ]Severe  Anxiety:                             [ ]Mild [ ]Moderate [ ]Severe  Agitation:                          [ ]Mild [ ]Moderate [ ]Severe  Fatigue:                             [ ]Mild [ ]Moderate [ ]Severe  Nausea:                             [ ]Mild [ ]Moderate [ ]Severe  Loss of appetite:              [ ]Mild [ ]Moderate [ ]Severe  Constipation:                   [ ]Mild [ ]Moderate [ ]Severe  Diarrhea:                          [ ]Mild [ ]Moderate [ ]Severe    Other Symptoms:  [ x]All other review of systems negative     Palliative Performance Status Version 2:    70-80     %    http://[facility].org/files/news/palliative_performance_scale_ppsv2.pdf    PHYSICAL EXAM:  Vital Signs Last 24 Hrs  T(C): 36.8 (15 Nov 2021 13:03), Max: 37.4 (2021 22:42)  T(F): 98.3 (15 Nov 2021 13:03), Max: 99.3 (2021 22:42)  HR: 89 (15 Nov 2021 16:11) (87 - 119)  BP: 126/92 (15 Nov 2021 16:11) (112/79 - 130/73)  BP(mean): --  RR: 20 (15 Nov 2021 16:11) (16 - 28)  SpO2: 100% (15 Nov 2021 16:11) (72% - 100%)     I&O's Summary      GENERAL:  [ x]Alert  [ x]Oriented x  3 [ ]Lethargic  [ ]Cachexia  [ ]Unarousable  [ x]Verbal  [ ]Non-Verbal  [ x] No Distress  Behavioral:   [ ] Anxiety  [ ] Delirium [ ] Agitation [ x] Calm  [ ] Other  HEENT:  [x ]Normal  [ ] Temporal Wasting  [ ]Dry mouth   [ ]ET Tube/Trach  [ ]Oral lesions  [ ] Mucositis  PULMONARY:   [ ]Clear [ ]Tachypnea  [ ]Audible excessive secretions   [ ]Rhonchi        [ ]Right [ ]Left [ ]Bilateral  [ ]Crackles        [ ]Right [ ]Left [ ]Bilateral  [x ]Wheezing     [ ]Right [ ]Left [ x]Bilateral  [x ]Diminished breath sounds [ ]right [ ]left [x ]bilateral  CARDIOVASCULAR:    [ x]Regular [ ]Irregular [ ]Tachy  [ ]Gerald [ ]Murmur [ ]Other  GASTROINTESTINAL:  [ x]Soft  [ ]Distended   [ ]+BS  [x ]Non tender [ ]Tender  [ ]PEG [ ]OGT/ NGT  Last BM: unknown  GENITOURINARY:  [ x]Normal [ ] Incontinent   [ ]Oliguria/Anuria   [ ]Roberts  MUSCULOSKELETAL:   [ x]Normal   [ ]Weakness  [ ]Bed/Wheelchair bound [ ]Edema  [  ] amputation  [  ] contraction  NEUROLOGIC:   [ x]No focal deficits  [ ]Cognitive impairment  [ ]Dysphagia [ ]Dysarthria [ ]Paresis [ ]Other   SKIN: See Nursing Skin Assessment for further details  [x ]Normal    [ ]Rash  [ ]Pressure ulcer(s)       Present on admission [ ]y [ ]n   [  ]  Wound    [  ] hyperpigmentation    CRITICAL CARE:  [ ] Shock Present  [ ]Septic [ ]Cardiogenic [ ]Neurologic [ ]Hypovolemic  [ ]  Vasopressors [ ]  Inotropes   [x ]Respiratory failure present [ ]Mechanical ventilation [x ]Non-invasive ventilatory support- HFNC [ ]High flow    [ x]Acute  [ ]Chronic [x ]Hypoxic  [ ]Hypercarbic [ ]Other  [ ]Other organ failure     LABS:                        10.4   7.44  )-----------( 437      ( 15 Nov 2021 00:34 )             34.8   11-15    139  |  103  |  27<H>  ----------------------------<  110<H>  4.5   |  25  |  1.34<H>    Ca    8.7      15 Nov 2021 00:34  Phos  3.1     11-15  Mg     2.10     11-15    TPro  7.0  /  Alb  3.2<L>  /  TBili  0.7  /  DBili  x   /  AST  18  /  ALT  18  /  AlkPhos  62  11-15  PT/INR - ( 15 Nov 2021 00:34 )   PT: 15.9 sec;   INR: 1.42 ratio         PTT - ( 15 Nov 2021 00:34 )  PTT:30.5 sec  Urinalysis Basic - ( 15 Nov 2021 04:02 )    Color: Light Yellow / Appearance: Slightly Turbid / S.015 / pH: x  Gluc: x / Ketone: Negative  / Bili: Negative / Urobili: <2 mg/dL   Blood: x / Protein: Trace / Nitrite: Negative   Leuk Esterase: Moderate / RBC: 2 /HPF / WBC 33 /HPF   Sq Epi: x / Non Sq Epi: 2 /HPF / Bacteria: Negative    RADIOLOGY & ADDITIONAL STUDIES:  CTA Chest 11/15/2021  FINDINGS:  LUNGS AND AIRWAYS: Patent central airways. Moderate to severe emphysema. Again noted, postsurgical changes of the right lung. Redemonstrated severe emphysema. Mostly stable, nodular opacities and masses, some of which are calcified. For example, left lower lobe calcified mass measures 3.2 x 3.1 cm (2:7). New 1.5 x 1.3 cm right upper lobe nodular opacity (2:31). Minimally increased groundglass opacities since prior study. Compressive atelectasis of the right lung.  PLEURA: Small right pleural effusion.  MEDIASTINUM AND ARNEL: Subcentimeter lymph nodes without lymphadenopathy.  VESSELS: Suboptimal contrast opacification of the peripheral pulmonary arteries. No obvious central pulmonary embolus or secondary signs of right heart strain. Stable main pulmonary artery enlargement. Atherosclerosis. Normal caliber of the thoracic aorta. Bovine arch.  HEART: Normal heart size. Trace pericardial effusion.  CHEST WALL AND LOWER NECK: Anasarca. Bilateral gynecomastia.  VISUALIZED UPPER ABDOMEN: Hiatal hernia again noted. Cholecystectomy. Nonspecific bilateral perinephric stranding. Right renal cyst partially visualized.   BONES: Degenerative changes.  IMPRESSION:  Suboptimal evaluation of the peripheral pulmonary arteries. No central pulmonary embolus or secondary signs of right heart strain.  Mostly stable, nodular opacities and masses, some of which are calcified. New 1.5 x 1.3 cm right upper lobe nodular opacity.  Additional findings as described.    CXRAY 11/15/2021  IMPRESSION:  Emphysema, bilateral diffuse reticular opacities, nodules, and a partially calcified peripheral left lower lung mass appears similar to the prior exams.  Bibasilar platelike atelectasis.  Indeterminate sclerotic densities again project over the left humeral head.    PROTEIN CALORIE MALNUTRITION PRESENT: [ ]mild [ ]moderate [ ]severe [ ]underweight [ ]morbid obesity  https://www.andeal.org/vault/2440/web/files/ONC/Table_Clinical%20Characteristics%20to%20Document%20Malnutrition-White%20JV%20et%20al%2020.pdf    Height (cm): 182.9 (21 @ 22:42)    [ ]PPSV2 < or = to 30% [ ]significant weight loss  [ ]poor nutritional intake  [ ]anasarca      [ ]Artificial Nutrition      REFERRALS:   [ ]Chaplaincy  [ ]Hospice  [ ]Child Life  [ ]Social Work  [x ]Case management [ ]Holistic Therapy     ************************************************************************  PALLIATIVE MEDICINE COORDINATION OF CARE DOCUMENTATION  [x] Inpatient Consult  [ ] Other:  ************************************************************************    HPI:  Mr. Gomez is a 80 year old man with bladder cancer (Dx: 2020, recurrence in 2021 on chemo C6 D3 Carboplatin, Gemcitabine), COPD (not on home O2), DVT (was on eliquis up through 2021 but was reportedly resolved so d/c'd) and potentially other problems (though he does not endorse any others and we have no outside records available) who presents with 4 days of subjective weakness, lower extremity swelling, shortness of breath, and one fever. He says that this began after his last chemotherapy treatment; he's never had this with chemotherapy before, though he has had some fevers afterward before.    In the ED,  VS: T 98.6 |  | /74 | RR 28 | O2 100% on 15L NRB --> 95% on HFNC at 60%/40LPM  PEx: Tachycardic, CTAB w/o w/r/r  Labs: Hgb 10.4, WBC 7.44 | Cr 1.34  Imaging: CTPA w/o central PE or RHS; calcified nodular opacities / masses | New 1.5cm x 1.3cm RUL Nodular Opacity  ECG: NSR  Micro: RVP (-) | UA with Sterile Pyuria  Interventions: Cefepime 2g, Aztreonam 1g, Vanc 1g  Impression: Acute Hypoxic Respiratory Failure, ?Sepsis    On the floor, he says that he has  (15 Nov 2021 06:55)      ------------------------------------------------------------------------    MEDICATION REVIEW:  --- Pls refer to current medicatons in the body of this note   MEDICATIONS  (STANDING):  albuterol/ipratropium for Nebulization 3 milliLiter(s) Nebulizer every 6 hours  budesonide 160 MICROgram(s)/formoterol 4.5 MICROgram(s) Inhaler 2 Puff(s) Inhalation two times a day  cefepime   IVPB 1000 milliGRAM(s) IV Intermittent every 8 hours  cyanocobalamin 1000 MICROGram(s) Oral daily  heparin   Injectable 5000 Unit(s) SubCutaneous every 8 hours    MEDICATIONS  (PRN):  nicotine -  14 mG/24Hr(s) Patch 1 patch Transdermal daily PRN For Tobacco Cravings only    ISTOP REFERENCE:  --- PRN usage: The patient HAS NOT used PRN's in the last 24h.  ------------------------------------------------------------------------  COORDINATION OF CARE:  --- Palliative Care consulted for:  --- Patient assessed:   --- Patient previously seen by Palliative Care service: NO  ADVANCE CARE PLANNING  --- Code status:   --- MOLST reviewed in chart: NONE  --- HCP/ Surrogate:   --- GOC document found in Alpha: NONE  --- HCP/ Living will/ Other advanced directives in Alpha: NONE  ------------------------------------------------------------------------  CARE PROVIDER DOCUMENTATION:  --- SW/CM notes:  --- PT recs:  --- Sp/Sw recs:   --- Nutrition recs:  PLAN OF CARE  --- Known admissions in past year:  --- Current admit date:   --- LOS:  --- LACE score:   --- Current dispo plan: TO BE DETERMINED  ------------------------------------------------------------------------  --- Chart reviewed: 30 Minutes [including time used to gather, review and transfer data to this note]    Prolonged services rendered, as part of this patient's care provided by Palliative Medicine, include: i.chart review for provider and ancillary service documentation, ii.pertinent diagnostics including laboratory and imaging studies,iii. medication review including PRN use, iv. admission history including previous palliative care encounters and GOC notes, v.advance care planning documents including HCP and MOLST forms in Alpha. Part of Palliative Medicine extended evaluation and management also involves coordination of care with our IDT, the primary and consulting shane, and unit CM/SW and Hospice if eligible. Recommendations based on the information gathered and discussed are outline in the AP of our notes.    ************************************************************************   HPI:  Mr. Gomez is a 80 year old man with bladder cancer (Dx: 2020, recurrence in 2021 on chemo C6 D3 Carboplatin, Gemcitabine), COPD (not on home O2), DVT (was on eliquis up through 2021 but was reportedly resolved so d/c'd) and potentially other problems (though he does not endorse any others and we have no outside records available) who presents with 4 days of subjective weakness, lower extremity swelling, shortness of breath, and one fever. He says that this began after his last chemotherapy treatment; he's never had this with chemotherapy before, though he has had some fevers afterward before.    In the ED,  VS: T 98.6 |  | /74 | RR 28 | O2 100% on 15L NRB --> 95% on HFNC at 60%/40LPM  PEx: Tachycardic, CTAB w/o w/r/r  Labs: Hgb 10.4, WBC 7.44 | Cr 1.34  Imaging: CTPA w/o central PE or RHS; calcified nodular opacities / masses | New 1.5cm x 1.3cm RUL Nodular Opacity  ECG: NSR  Micro: RVP (-) | UA with Sterile Pyuria  Interventions: Cefepime 2g, Aztreonam 1g, Vanc 1g  Impression: Acute Hypoxic Respiratory Failure, ?Sepsis    On the floor, he says that he has  (15 Nov 2021 06:55)    PERTINENT PM/SXH:   Hypertension  Emphysema of lung  Nodule of left lung  Vitamin D deficiency  S/P laparoscopic cholecystectomy  S/P left inguinal hernia repair  S/P cataract surgery    FAMILY HISTORY:  Family hx of hypertension  FH: diabetes mellitus    ITEMS NOT CHECKED ARE NOT PRESENT    SOCIAL HISTORY:   Significant other/partner[x ]  Children[ x]  Zoroastrianism/Spirituality: Buddhism  Substance hx:  [ ]   Tobacco hx:  [ x]   Alcohol hx: [ ]   Home Opioid hx:  [ ] I-Stop Reference No: [id number]  Living Situation: [x ]Home  [ ]Long term care  [ ]Rehab [ ]Other    ADVANCE DIRECTIVES:    DNR  MOLST  [x ]  Living Will  [ ]   DECISION MAKER(s):  [ ] Health Care Proxy(s)  [x ] Surrogate(s)  [ ] Guardian           Name(s): Phone Number(s):  Wife Sotero Gomez: [phone]    BASELINE (I)ADL(s) (prior to admission):  [place]: [x ]Total  [ ] Moderate [ ]Dependent    Allergies    allergic to cefobid, unknown reaction (Other)  penicillin (Rash)    Intolerances    MEDICATIONS  (STANDING):  albuterol/ipratropium for Nebulization 3 milliLiter(s) Nebulizer every 6 hours  budesonide 160 MICROgram(s)/formoterol 4.5 MICROgram(s) Inhaler 2 Puff(s) Inhalation two times a day  cefepime   IVPB 1000 milliGRAM(s) IV Intermittent every 8 hours  cyanocobalamin 1000 MICROGram(s) Oral daily  heparin   Injectable 5000 Unit(s) SubCutaneous every 8 hours    MEDICATIONS  (PRN):  nicotine -  14 mG/24Hr(s) Patch 1 patch Transdermal daily PRN For Tobacco Cravings only      PRESENT SYMPTOMS: [ ]Unable to obtain due to poor mentation   Source if other than patient:  [ ]Family   [ ]Team     Pain: [ ]yes [x ]no  QOL impact -   Location -                    Aggravating factors -  Quality -  Radiation -  Timing-  Severity (0-10 scale):  Minimal acceptable level (0-10 scale):     CPOT:    https://www.[facility].org/getattachment/[email or web address]/Critical-Care-Pain-Observation-Tool-(CPOT)      PAIN AD Score:     http://geriatrictoolkit.[facility]/cog/painad.pdf (press ctrl +  left click to view)    Dyspnea:                           [x ]Mild [ ]Moderate [ ]Severe  Anxiety:                             [ ]Mild [ ]Moderate [ ]Severe  Agitation:                          [ ]Mild [ ]Moderate [ ]Severe  Fatigue:                             [ ]Mild [ ]Moderate [ ]Severe  Nausea:                             [ ]Mild [ ]Moderate [ ]Severe  Loss of appetite:              [ ]Mild [ ]Moderate [ ]Severe  Constipation:                   [ ]Mild [ ]Moderate [ ]Severe  Diarrhea:                          [ ]Mild [ ]Moderate [ ]Severe    Other Symptoms:  [ x]All other review of systems negative     Palliative Performance Status Version 2:    70-80     %    http://[email or web address]/files/news/palliative_performance_scale_[email or web address].pdf    PHYSICAL EXAM:  Vital Signs Last 24 Hrs  T(C): 36.8 (15 Nov 2021 13:03), Max: 37.4 (2021 22:42)  T(F): 98.3 (15 Nov 2021 13:03), Max: 99.3 (2021 22:42)  HR: 89 (15 Nov 2021 16:11) (87 - 119)  BP: 126/92 (15 Nov 2021 16:11) (112/79 - 130/73)  BP(mean): --  RR: 20 (15 Nov 2021 16:11) (16 - 28)  SpO2: 100% (15 Nov 2021 16:11) (72% - 100%)     I&O's Summary      GENERAL:  [ x]Alert  [ x]Oriented x  3 [ ]Lethargic  [ ]Cachexia  [ ]Unarousable  [ x]Verbal  [ ]Non-Verbal  [ x] No Distress  Behavioral:   [ ] Anxiety  [ ] Delirium [ ] Agitation [ x] Calm  [ ] Other  HEENT:  [x ]Normal  [ ] Temporal Wasting  [ ]Dry mouth   [ ]ET Tube/Trach  [ ]Oral lesions  [ ] Mucositis  PULMONARY:   [ ]Clear [ ]Tachypnea  [ ]Audible excessive secretions   [ ]Rhonchi        [ ]Right [ ]Left [ ]Bilateral  [ ]Crackles        [ ]Right [ ]Left [ ]Bilateral  [x ]Wheezing     [ ]Right [ ]Left [ x]Bilateral  [x ]Diminished breath sounds [ ]right [ ]left [x ]bilateral  CARDIOVASCULAR:    [ x]Regular [ ]Irregular [ ]Tachy  [ ]Gerald [ ]Murmur [ ]Other  GASTROINTESTINAL:  [ x]Soft  [ ]Distended   [ ]+BS  [x ]Non tender [ ]Tender  [ ]PEG [ ]OGT/ NGT  Last BM: unknown  GENITOURINARY:  [ x]Normal [ ] Incontinent   [ ]Oliguria/Anuria   [ ]Roberts  MUSCULOSKELETAL:   [ x]Normal   [ ]Weakness  [ ]Bed/Wheelchair bound [ ]Edema  [  ] amputation  [  ] contraction  NEUROLOGIC:   [ x]No focal deficits  [ ]Cognitive impairment  [ ]Dysphagia [ ]Dysarthria [ ]Paresis [ ]Other   SKIN: See Nursing Skin Assessment for further details  [x ]Normal    [ ]Rash  [ ]Pressure ulcer(s)       Present on admission [ ]y [ ]n   [  ]  Wound    [  ] hyperpigmentation    CRITICAL CARE:  [ ] Shock Present  [ ]Septic [ ]Cardiogenic [ ]Neurologic [ ]Hypovolemic  [ ]  Vasopressors [ ]  Inotropes   [x ]Respiratory failure present [ ]Mechanical ventilation [x ]Non-invasive ventilatory support- HFNC [ ]High flow    [ x]Acute  [ ]Chronic [x ]Hypoxic  [ ]Hypercarbic [ ]Other  [ ]Other organ failure     LABS:                        10.4   7.44  )-----------( 437      ( 15 Nov 2021 00:34 )             34.8   11-15    139  |  103  |  27<H>  ----------------------------<  110<H>  4.5   |  25  |  1.34<H>    Ca    8.7      15 Nov 2021 00:34  Phos  3.1     11-15  Mg     2.10     11-15    TPro  7.0  /  Alb  3.2<L>  /  TBili  0.7  /  DBili  x   /  AST  18  /  ALT  18  /  AlkPhos  62  11-15  PT/INR - ( 15 Nov 2021 00:34 )   PT: 15.9 sec;   INR: 1.42 ratio         PTT - ( 15 Nov 2021 00:34 )  PTT:30.5 sec  Urinalysis Basic - ( 15 Nov 2021 04:02 )    Color: Light Yellow / Appearance: Slightly Turbid / S.015 / pH: x  Gluc: x / Ketone: Negative  / Bili: Negative / Urobili: <2 mg/dL   Blood: x / Protein: Trace / Nitrite: Negative   Leuk Esterase: Moderate / RBC: 2 /HPF / WBC 33 /HPF   Sq Epi: x / Non Sq Epi: 2 /HPF / Bacteria: Negative    RADIOLOGY & ADDITIONAL STUDIES:  CTA Chest 11/15/2021  FINDINGS:  LUNGS AND AIRWAYS: Patent central airways. Moderate to severe emphysema. Again noted, postsurgical changes of the right lung. Redemonstrated severe emphysema. Mostly stable, nodular opacities and masses, some of which are calcified. For example, left lower lobe calcified mass measures 3.2 x 3.1 cm (2:7). New 1.5 x 1.3 cm right upper lobe nodular opacity (2:31). Minimally increased groundglass opacities since prior study. Compressive atelectasis of the right lung.  PLEURA: Small right pleural effusion.  MEDIASTINUM AND ARNEL: Subcentimeter lymph nodes without lymphadenopathy.  VESSELS: Suboptimal contrast opacification of the peripheral pulmonary arteries. No obvious central pulmonary embolus or secondary signs of right heart strain. Stable main pulmonary artery enlargement. Atherosclerosis. Normal caliber of the thoracic aorta. Bovine arch.  HEART: Normal heart size. Trace pericardial effusion.  CHEST WALL AND LOWER NECK: Anasarca. Bilateral gynecomastia.  VISUALIZED UPPER ABDOMEN: Hiatal hernia again noted. Cholecystectomy. Nonspecific bilateral perinephric stranding. Right renal cyst partially visualized.   BONES: Degenerative changes.  IMPRESSION:  Suboptimal evaluation of the peripheral pulmonary arteries. No central pulmonary embolus or secondary signs of right heart strain.  Mostly stable, nodular opacities and masses, some of which are calcified. New 1.5 x 1.3 cm right upper lobe nodular opacity.  Additional findings as described.    CXRAY 11/15/2021  IMPRESSION:  Emphysema, bilateral diffuse reticular opacities, nodules, and a partially calcified peripheral left lower lung mass appears similar to the prior exams.  Bibasilar platelike atelectasis.  Indeterminate sclerotic densities again project over the left humeral head.    PROTEIN CALORIE MALNUTRITION PRESENT: [ ]mild [ ]moderate [ ]severe [ ]underweight [ ]morbid obesity  https://www.andeal.org/vault/2440/web/files/ONC/Table_Clinical%20Characteristics%20to%20Document%20Malnutrition-White%20JV%20et%20al%2020.pdf    Height (cm): 182.9 (21 @ 22:42)    [ ]PPSV2 < or = to 30% [ ]significant weight loss  [ ]poor nutritional intake  [ ]anasarca      [ ]Artificial Nutrition      REFERRALS:   [ ]Chaplaincy  [ ]Hospice  [ ]Child Life  [ ]Social Work  [x ]Case management [ ]Holistic Therapy     ************************************************************************  PALLIATIVE MEDICINE COORDINATION OF CARE DOCUMENTATION  [x] Inpatient Consult  [ ] Other:  ************************************************************************    HPI:  Mr. Gomez is a 80 year old man with bladder cancer (Dx: 2020, recurrence in 2021 on chemo C6 D3 Carboplatin, Gemcitabine), COPD (not on home O2), DVT (was on eliquis up through 2021 but was reportedly resolved so d/c'd) and potentially other problems (though he does not endorse any others and we have no outside records available) who presents with 4 days of subjective weakness, lower extremity swelling, shortness of breath, and one fever. He says that this began after his last chemotherapy treatment; he's never had this with chemotherapy before, though he has had some fevers afterward before.    ------------------------------------------------------------------------    MEDICATION REVIEW:  --- Pls refer to current medicatons in the body of this note  ISTOP REFERENCE: [id number]. No prescriptions found on ISTOP   --- PRN usage: The patient HAS NOT used PRN's in the last 24h.  ------------------------------------------------------------------------  COORDINATION OF CARE:  --- Palliative Care consulted for: goals of care  --- Patient assessed: 11/15/2021  --- Patient previously seen by Palliative Care service: NO  ADVANCE CARE PLANNING  --- Code status: DNR/DNI  --- MOLST reviewed in chart: YES  --- Surrogate: Sotero Gomez  --- [facility] document found in Alpha: NONE  --- HCP/ Living will/ Other advanced directives in Alpha: NONE  ------------------------------------------------------------------------  CARE PROVIDER DOCUMENTATION:  ---Medicine notes reviewed   ---Oncology recs: Patient with bladder cancer" on carboplatin + gemcitabine. Last chemotherapy administration was on 2021. - No treatment while inpatient.- On discharge, will follow-up with Dr. Willingham."  PLAN OF CARE  --- Known admissions in past year: 1  --- Current admit date: 11/15/2021  --- LOS:1 da y  --- LACE score: 8  --- Current dispo plan: TO BE DETERMINED  ------------------------------------------------------------------------  --- Chart reviewed: 30 Minutes [including time used to gather, review and transfer data to this note]    Prolonged services rendered, as part of this patient's care provided by Palliative Medicine, include: i.chart review for provider and ancillary service documentation, ii.pertinent diagnostics including laboratory and imaging studies,iii. medication review including PRN use, iv. admission history including previous palliative care encounters and GOC notes, v.advance care planning documents including HCP and MOLST forms in [facility]. Part of Palliative Medicine extended evaluation and management also involves coordination of care with our IDT, the primary and consulting shane, and unit /SW and Hospice if eligible. Recommendations based on the information gathered and discussed are outline in the AP of our notes.    ************************************************************************

## 2021-11-15 NOTE — ED ADULT NURSE REASSESSMENT NOTE - NS ED NURSE REASSESS COMMENT FT1
Patient back from CT. Radiologist made RN aware of infiltrated to right forearm. It was discontinued and a cold pack was given and extremity was elevated. MD Levin notified. New #20g was started to right hand. Will continue to monitor.

## 2021-11-15 NOTE — H&P ADULT - NSICDXPASTMEDICALHX_GEN_ALL_CORE_FT
PAST MEDICAL HISTORY:  Emphysema of lung does not use inhalers as rx'd    Hypertension on no med, monitored by MD    Nodule of left lung monitored by MD , had CT and biopsy 7/2019  inconclusive,  will have next CT 1/2020    Vitamin D deficiency

## 2021-11-15 NOTE — H&P ADULT - PROBLEM SELECTOR PLAN 1
Presents with relatively acute SOB (x4d), found to have O2 sat in mid-80s with known emphysema/COPD, LE swelling. BNP elevated (even when accounting for age), trop elevated (delta > 6). DDx broad but includes cardiovascular   DDx:  - Cardiovascular: Heart Failure Presents with relatively acute SOB (x4d), found to have O2 sat in mid-80s with known emphysema/COPD, LE swelling. BNP elevated (even when accounting for age), trop elevated (delta > 6). DDx broad but includes cardiovascular disease (incl. HF vs cardiomyopathy 2/2 gemcitabine?), primary pulm (COPD exaerbation vs ARDS), infectious (PNA), hematologic (DVT - less likely given negative b/l [facility] Duplex & no PE on CTPA). CXR with stable opacities but new RUL lesion - with this & sterile pyuria, should consider TB though very low likelihood given no risk factors (no incarceration, no immigration from endemic region).   Diagnostics  - POCUS U/S without obvious LV/RV dysfunction, no obvious septal bowing or RHS  - I/O's  - RVP negative, PCT 0.12 (mildly elevated but nto systemic sepsis)  - ABG remarkable for a relatively low pO2 given how much high flow he's on, but otherwise totally unremarkable.  [ ] F/U BCx, UCx  [ ] Quantiferon TB  [ ] MRSA Swab  [ ] TTE    Therapeutics  - C/W HFNC, may escalate to BiPAP given h/o COPD, ? volume overload  - C/W Cefepime 1g IV QD  - Holding off on Vancomycin & Anaerobic coverage pending MRSA swab result, given clinical stability  - S/P Furosemide 80mg IVP x1

## 2021-11-15 NOTE — ED ADULT NURSE REASSESSMENT NOTE - NS ED NURSE REASSESS COMMENT FT1
admitting team at bedside for eval and bedside ultrasound. Pt given 80mg Lasix as per MD orders. O2 sat 100% with increased high flow. No further intervention at this time. Will continue to monitor.

## 2021-11-15 NOTE — H&P ADULT - PROBLEM SELECTOR PLAN 3
Elevated Trop to 52, down to 42 w/o chest pain. Likely Type 2 NSTEMI ico demand ischemic from hypoxia. This is a relatively poor prognostic factor but does not require acute intervention at this time.

## 2021-11-15 NOTE — CONSULT NOTE ADULT - PROBLEM SELECTOR RECOMMENDATION 5
Emotional support provided, questions answered.    Thank you for allowing us to participate in your patient's care. Please page [phone] for any questions/concerns.

## 2021-11-15 NOTE — H&P ADULT - PROBLEM SELECTOR PLAN 7
MCV ~100, anemia to 10.4 May be 2/2 chemotherapy. He should be taking B vitamins as OP but does not.   [ ] B12, Folate

## 2021-11-15 NOTE — H&P ADULT - NSHPLABSRESULTS_GEN_ALL_CORE
LABS:                         10.4   7.44  )-----------( 437      ( 15 Nov 2021 00:34 )             34.8     11-15    139  |  103  |  27<H>  ----------------------------<  110<H>  4.5   |  25  |  1.34<H>    Ca    8.7      15 Nov 2021 00:34  Phos  3.1     11-15  Mg     2.10     11-15    TPro  7.0  /  Alb  3.2<L>  /  TBili  0.7  /  DBili  x   /  AST  18  /  ALT  18  /  AlkPhos  62  11-15    PT/INR - ( 15 Nov 2021 00:34 )   PT: 15.9 sec;   INR: 1.42 ratio         PTT - ( 15 Nov 2021 00:34 )  PTT:30.5 sec  Urinalysis Basic - ( 15 Nov 2021 04:02 )    Color: Light Yellow / Appearance: Slightly Turbid / S.015 / pH: x  Gluc: x / Ketone: Negative  / Bili: Negative / Urobili: <2 mg/dL   Blood: x / Protein: Trace / Nitrite: Negative   Leuk Esterase: Moderate / RBC: 2 /HPF / WBC 33 /HPF   Sq Epi: x / Non Sq Epi: 2 /HPF / Bacteria: Negative      Serum Pro-Brain Natriuretic Peptide: 2751 pg/mL (11-15 @ 00:34)        MICROBIOLOGY    IMAGING  < from: CT Angio Chest PE Protocol w/ IV Cont (11.15.21 @ 03:42) >    FINDINGS:    LUNGS AND AIRWAYS: Patentcentral airways. Moderate to severe emphysema. Again noted, postsurgical changes of the right lung. Redemonstrated severe emphysema. Mostly stable, nodular opacities and masses, some of which are calcified. For example, left lower lobe calcified massmeasures 3.2 x 3.1 cm (2:7). New 1.5 x 1.3 cm right upper lobe nodular opacity (2:31). Minimally increased groundglass opacities since prior study. Compressive atelectasis of the right lung.  PLEURA: Small right pleural effusion.  MEDIASTINUM AND ARNEL: Subcentimeter lymph nodes without lymphadenopathy.  VESSELS: Suboptimal contrast opacification of the peripheral pulmonary arteries. No obvious central pulmonary embolus or secondary signs of right heart strain. Stable main pulmonary artery enlargement. Atherosclerosis. Normal caliber of the thoracic aorta. Bovine arch.  HEART: Normal heart size. Trace pericardial effusion.  CHEST WALL AND LOWER NECK: Anasarca. Bilateral gynecomastia.  VISUALIZED UPPER ABDOMEN: Hiatal hernia again noted. Cholecystectomy. Nonspecific bilateral perinephric stranding. Right renal cyst partially visualized.  BONES: Degenerative changes.    IMPRESSION:    Suboptimal evaluation of the peripheral pulmonary arteries. No central pulmonary embolus or secondary signs of right heart strain.    Mostly stable, nodular opacities and masses, some of which are calcified. New 1.5 x 1.3 cm right upper lobe nodular opacity.    Additional findings as described.    < end of copied text >      CARDIOLOGY

## 2021-11-15 NOTE — H&P ADULT - PROBLEM SELECTOR PLAN 9
Hospital Bundle  Fluids: PO ad eugenie  Electrolytes: Replete K > 4, Mg > 2, Phos > 3  Nutrition: Diet DASH  PPX  ---VTE: SQH  ---GI: N/A  ---Resp: IS  Access: PIV  Code Status: DNR/DNI (MOLST in Chart)  Dispo: Pending clinical stabilization, PT eval

## 2021-11-15 NOTE — H&P ADULT - PROBLEM SELECTOR PLAN 2
Has known COPD 2/2 smoking hx. Not obvious in exacerbation (no increased sputum, no worsened cough) despite hypoxia.  - START Home Advair BID  - Goal O2 88-92% (does not need to be > 94%)

## 2021-11-15 NOTE — CONSULT NOTE ADULT - SUBJECTIVE AND OBJECTIVE BOX
CHIEF COMPLAINT  Bladder Cancer    HISTORY OF PRESENT ILLNESS  SHERYL DELONG is a 80y Male who presents with a chief complaint of respiratory failure      PAST MEDICAL AND SURGICAL HISTORY    FAMILY HISTORY    SOCIAL HISTORY    REVIEW OF SYSTEMS  A complete review of systems was performed; negative except per HPI    PHYSICAL EXAM  T(C): 36.6 (11-15-21 @ 08:27), Max: 37.4 (11-14-21 @ 22:42)  HR: 93 (11-15-21 @ 11:27) (92 - 119)  BP: 130/73 (11-15-21 @ 11:27) (112/79 - 130/73)  RR: 20 (11-15-21 @ 11:27) (16 - 28)  SpO2: 100% (11-15-21 @ 11:27) (72% - 100%)  Constitutional: alert, awake, in no acute distress  Eyes: PERRL, EOMI  HEENT: normocephalic, atraumatic  Neck: supple, non-tender  Cardiovascular: normal perfusion, no peripheral edema  Respiratory: normal respiratory efforts; no increased use of accessory muscles  Gastrointestinal: soft, non-tender  Musculoskeletal: normal range of motion, no deformities noted  Neurological: alert, oriented * 4, no focal deficits, CN II to XI grossly intact  Skin: warm, dry, no obvious lesions    LABORATORY DATA      RADIOLOGY REVIEW   CHIEF COMPLAINT  Bladder Cancer    HISTORY OF PRESENT ILLNESS  SHERYL DELONG is a 80y Male who presents with a chief complaint of respiratory failure    Patient is followed by Dr. Willingham. 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 He was not a candidate for cystectomy, and was started on carboplatin + gemcitabine. Last chemotherapy administration was on November 12th - Cycle 6 Day 1.     Patient presented to [facility] ED with several days' duration of weakness, fever, dyspnea, and lower extremity edema. On arrival he was found to be hypoxic and required non-rebreather at 15L. He was admitted for further workup and management.     PAST MEDICAL AND SURGICAL HISTORY  Bladder Cancer  Emphysema with known lung masses  Venous Thromboembolism    FAMILY HISTORY  Non-contributory    SOCIAL HISTORY  Denies tobacco use    REVIEW OF SYSTEMS  A complete review of systems was performed; negative except per HPI    PHYSICAL EXAM  T(C): 36.6 (11-15-21 @ 08:27), Max: 37.4 (11-14-21 @ 22:42)  HR: 93 (11-15-21 @ 11:27) (92 - 119)  BP: 130/73 (11-15-21 @ 11:27) (112/79 - 130/73)  RR: 20 (11-15-21 @ 11:27) (16 - 28)  SpO2: 100% (11-15-21 @ 11:27) (72% - 100%)  Constitutional: alert, awake, in no acute distress  Eyes: PERRL, EOMI  HEENT: normocephalic, atraumatic  Neck: supple, non-tender  Cardiovascular: normal perfusion, no peripheral edema  Gastrointestinal: soft, non-tender  Musculoskeletal: normal range of motion, no deformities noted  Neurological: alert, CN II to XI grossly intact  Skin: warm, dry    LABORATORY DATA                        10.4   7.44  )-----------( 437      ( 15 Nov 2021 00:34 )             34.8     11-15    139  |  103  |  27<H>  ----------------------------<  110<H>  4.5   |  25  |  1.34<H>    Ca    8.7      15 Nov 2021 00:34  Phos  3.1     11-15  Mg     2.10     11-15    TPro  7.0  /  Alb  3.2<L>  /  TBili  0.7  /  DBili  x   /  AST  18  /  ALT  18  /  AlkPhos  62  11-15    RADIOLOGY REVIEW  CT Chest  IMPRESSION:    Suboptimal evaluation of the peripheral pulmonary arteries. No central pulmonary embolus or secondary signsof right heart strain.    Mostly stable, nodular opacities and masses, some of which are calcified. New 1.5 x 1.3 cm right upper lobe nodular opacity.    Additional findings as described.
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
## 2021-11-15 NOTE — ED ADULT NURSE REASSESSMENT NOTE - NS ED NURSE REASSESS COMMENT FT1
received report from APRIL Lennon. Pt AxOx4, on high flow- tolerating well. Maintaining O2 sat between 94-95%. RR mildly labored. No pallor/diaphoresis noted. No accessory muscle use noted. Pt denies CP, SOB, dizziness, h/a. VSS and as noted. Pt NSR on cardiac monitor. Purposefully moving all extremities. Awaiting bed assignment. Will continue to monitor.

## 2021-11-15 NOTE — H&P ADULT - ASSESSMENT
Mr. Gomez is a 80M with Bladder Cancer (Dx: 01/2020 initially in remission after chemo & radiation, recurrence in 06/2021 - now C6D4 of Gemcitabine + Carboplatin), HTN (no meds), COPD (not on home O2), DVT (off eliquis since 03/2021 after clearance) who is admitted for evaluation and management of acute hypoxic respiratory failure with exact etiology unknown but potentially cardiogenic, primary pulm, or iatrogenic (ico gemcitabine).

## 2021-11-15 NOTE — ED ADULT NURSE REASSESSMENT NOTE - NS ED NURSE REASSESS COMMENT FT1
Patient AAOX4, resting comfortably in stretcher. Currently on high flow O2. Ct showed bilateral atelectasis. O2 saturation ranging from 95-99%. NSR on cardiac monitor. Breathing even and unlabored at this time. Denies chest pain, headache or dizziness at this time. Awaiting bed placement. Will continue to monitor.

## 2021-11-15 NOTE — ED ADULT NURSE REASSESSMENT NOTE - NS ED NURSE REASSESS COMMENT FT1
Patient AAOX4, resting comfortably in stretcher. V/S within baseline limits. Denies chest pain, SOB, headache or dizziness. Troponin and blood gas drawn as per MD order. Awaiting CTa. Sinus tachycardia on cardiac monitor. Will continue to monitor.

## 2021-11-15 NOTE — H&P ADULT - PROBLEM SELECTOR PLAN 6
Identified originally via microscopic hematuria in 01/2020, formally diagnosed later. He says that he received chemo & radiation and went into remission; however, now has recurrence to "Stage 2" since 06/2021. He's now cycle 6 of Carboplatin + Gemcitabine for this with Dr. Willingham.   - Onc C/S, Appreciate Recs  - no chemo while inpatient

## 2021-11-15 NOTE — H&P ADULT - PROBLEM SELECTOR PLAN 4
BNP elevated to 2.7k, which is actually elevated despite adjustment for age. May indicate cardiac stretching ico HF vs strain

## 2021-11-15 NOTE — CONSULT NOTE ADULT - PROBLEM SELECTOR RECOMMENDATION 3
Patient is on carboplatin + gemcitabine. Last chemotherapy administration was on November 12th, 2021.   Oncology recommendations appreciated: No treatment while inpatient.  Follows with Dr. Willingham outpatient.

## 2021-11-15 NOTE — H&P ADULT - PROBLEM SELECTOR PLAN 5
Creatinine elevated to 1.3 from b/l ~0.8. Likely pre-renal / potentially cardiorenal if HF playing a role. Will trend & monitor closely.   - lasix challenge as above, CTM  - avoid nephrotoxics

## 2021-11-15 NOTE — ED ADULT NURSE REASSESSMENT NOTE - NS ED NURSE REASSESS COMMENT FT1
pt's O2 sat noted to be between 82-89%. House staff 3 made aware. pt OK to maintain O2 sat at 88%. Pt denies CP, SOB. Remains mildly tachypneic. No further intervention at this time as per team.

## 2021-11-15 NOTE — CONSULT NOTE ADULT - PROBLEM SELECTOR RECOMMENDATION 4
MOLST completed by primary team with patient. MOLST in chart reflects: DNR/DNI. LImited Medical Interventions. Send to hospital. No feeding tube. Use abx.

## 2021-11-15 NOTE — H&P ADULT - NSHPSOCIALHISTORY_GEN_ALL_CORE
Lives with wife at home and is independent. Lives with wife at home and is independent.     Smoking: He is a current every day smoker (~1 pack/2 weeks). He's cut back from previous and is working on cutting back more.  EtOH: last drink > 1 year ago (since Cancer diagnosis)  Other subs: never used

## 2021-11-15 NOTE — ED ADULT NURSE REASSESSMENT NOTE - NS ED NURSE REASSESS COMMENT FT1
pt noted to be continuously satting 82-84%.  MD Pereira made aware. Respiratory paged and pt increased to 100% o2 and 60LPM on high flow. Pt denies new onset SOB or CP. HR and BP remain stable. Updated MOLST signed by MD Pereira. Pt DNR/DNI. Will continue to monitor. pt noted to be continuously satting 82-84%.  MD Pereira made aware. Respiratory paged and pt increased to 100% o2 and 60LPM on high flow with sat improving to 100%. Pt denies new onset SOB or CP. HR and BP remain stable. Updated MOLST signed by MD Pereira. Pt DNR/DNI. Will continue to monitor. pt noted to be continuously satting 82-84%.  MD Pereira made aware. Respiratory paged and pt increased to 100% o2 and 60LPM on high flow with sat improving to 100%. Pt denies new onset SOB or CP. HR and BP remain stable. Updated MOLST signed by MD Pereira. Pt DNR/DNI. Admitting team paged at this time. Awaiting response. Will continue to monitor.

## 2021-11-15 NOTE — CONSULT NOTE ADULT - PROBLEM SELECTOR RECOMMENDATION 2
- unclear etiology   - Patient with known severe emphysema with multiple lung nodular opacities and masses; previous biopsy had been benign. Per oncology: " Gemcitabine is associated with pulmonary toxicities; rule out more common etiology first. Lung toxicity would be a diagnosis of exclusion given the variety of presentations it could happen."   - workup/management as per primary team   - supplemental oxygen via HFNC currently.

## 2021-11-15 NOTE — H&P ADULT - ATTENDING COMMENTS
Admitted for acute hypoxemic respiratory failure of unclear etiology, PE has been ruled out, possibly COPD exacerbation (although pt did not have much rhonchi/wheezing at time of exam) but he's known to have severe emphysema, possible pneumonia (need to cover for gram negative organisms given ongoing chemotherapy), possible cardiac (less likely) vs other-chemo related  C/w IV cefepime for now, check MRSA nasal swab and if positive will add Vancomycin, f/up procalcitonin  Check TTE, f/up duplex LE's-negative for DVT  Wean off oxygen as tolerated (on HFNC 100% at time of visit), f/up ABG  Improved lactate elevation  BRISA, f/up urine lytes, f/up BMP in AM  Made DNR/DNI earlier, confirmed code status with patient  Apprec Oncology/Pallliative recs  Mild protein calorie malnutrition  Rest as above Admitted for acute hypoxemic respiratory failure of unclear etiology, PE has been ruled out, possibly COPD exacerbation (although pt did not have much rhonchi/wheezing at time of exam) but he's known to have severe emphysema, possible pneumonia (need to cover for gram negative organisms given ongoing chemotherapy), possible cardiac (less likely) vs other-chemo related  C/w IV cefepime for now, check MRSA nasal swab and if positive will add Vancomycin, f/up procalcitonin  Check TTE, f/up duplex LE's-negative for DVT  Wean off oxygen as tolerated (on HFNC 100% at time of visit), f/up ABG  CT also shows new RUL nodular opacity-to be f/up, possible infectious vs malignancy related  Improved lactate elevation  BRISA, f/up urine lytes, f/up BMP in AM  Made DNR/DNI earlier, confirmed code status with patient  Apprec Oncology/Pallliative recs  Mild protein calorie malnutrition  Rest as above

## 2021-11-15 NOTE — H&P ADULT - NSHPREVIEWOFSYSTEMS_GEN_ALL_CORE
GEN: No fever, chills, night sweats, weight loss  EYES: No vision changes, irritation, itchiness  ENT: No ear pain, congestion, sore throat  RESP: No cough or trouble breathing  CARDIOVASCULAR: No chest pain or palpitations  GI: No nausea/vomiting, diarrhea, constipation  :  No change in urine output; no dysuria, hematuria, or discharge  MSK: No joint or muscle pain  SKIN: No rashes  NEURO: No headache; no abnormal movements; no numbness or tingling  Remainder negative, except as per the HPI GEN: No fever now, chills, night sweats, weight loss  EYES: No vision changes, irritation, itchiness  ENT: No ear pain, congestion, sore throat  RESP: as per HPI  CARDIOVASCULAR: No chest pain or palpitations  GI: No nausea/vomiting, diarrhea, constipation  :  No change in urine output; no dysuria, hematuria, or discharge  MSK: No joint or muscle pain  SKIN: No rashes  NEURO: No headache; no abnormal movements; no numbness or tingling  Remainder negative, except as per the HPI

## 2021-11-16 DIAGNOSIS — E16.2 HYPOGLYCEMIA, UNSPECIFIED: ICD-10-CM

## 2021-11-16 LAB
24R-OH-CALCIDIOL SERPL-MCNC: 39.3 NG/ML — SIGNIFICANT CHANGE UP (ref 30–80)
ANION GAP SERPL CALC-SCNC: 9 MMOL/L — SIGNIFICANT CHANGE UP (ref 7–14)
BUN SERPL-MCNC: 15 MG/DL — SIGNIFICANT CHANGE UP (ref 7–23)
CALCIUM SERPL-MCNC: 8.9 MG/DL — SIGNIFICANT CHANGE UP (ref 8.4–10.5)
CHLORIDE SERPL-SCNC: 101 MMOL/L — SIGNIFICANT CHANGE UP (ref 98–107)
CO2 SERPL-SCNC: 32 MMOL/L — HIGH (ref 22–31)
COVID-19 NUCLEOCAPSID GAM AB INTERP: NEGATIVE — SIGNIFICANT CHANGE UP
COVID-19 NUCLEOCAPSID TOTAL GAM ANTIBODY RESULT: 0.09 INDEX — SIGNIFICANT CHANGE UP
COVID-19 SPIKE DOMAIN AB INTERP: POSITIVE
COVID-19 SPIKE DOMAIN ANTIBODY RESULT: >250 U/ML — HIGH
CREAT SERPL-MCNC: 0.99 MG/DL — SIGNIFICANT CHANGE UP (ref 0.5–1.3)
CULTURE RESULTS: SIGNIFICANT CHANGE UP
FOLATE SERPL-MCNC: 8.8 NG/ML — SIGNIFICANT CHANGE UP (ref 3.1–17.5)
GLUCOSE BLDC GLUCOMTR-MCNC: 147 MG/DL — HIGH (ref 70–99)
GLUCOSE BLDC GLUCOMTR-MCNC: 97 MG/DL — SIGNIFICANT CHANGE UP (ref 70–99)
GLUCOSE SERPL-MCNC: 66 MG/DL — LOW (ref 70–99)
HCT VFR BLD CALC: 35.8 % — LOW (ref 39–50)
HGB BLD-MCNC: 10.8 G/DL — LOW (ref 13–17)
LACTATE SERPL-SCNC: 1 MMOL/L — SIGNIFICANT CHANGE UP (ref 0.5–2)
MAGNESIUM SERPL-MCNC: 1.8 MG/DL — SIGNIFICANT CHANGE UP (ref 1.6–2.6)
MCHC RBC-ENTMCNC: 29.3 PG — SIGNIFICANT CHANGE UP (ref 27–34)
MCHC RBC-ENTMCNC: 30.2 GM/DL — LOW (ref 32–36)
MCV RBC AUTO: 97 FL — SIGNIFICANT CHANGE UP (ref 80–100)
MRSA PCR RESULT.: SIGNIFICANT CHANGE UP
NRBC # BLD: 0 /100 WBCS — SIGNIFICANT CHANGE UP
NRBC # FLD: 0.02 K/UL — HIGH
PHOSPHATE SERPL-MCNC: 2.3 MG/DL — LOW (ref 2.5–4.5)
PLATELET # BLD AUTO: 402 K/UL — HIGH (ref 150–400)
POTASSIUM SERPL-MCNC: 4 MMOL/L — SIGNIFICANT CHANGE UP (ref 3.5–5.3)
POTASSIUM SERPL-SCNC: 4 MMOL/L — SIGNIFICANT CHANGE UP (ref 3.5–5.3)
RBC # BLD: 3.69 M/UL — LOW (ref 4.2–5.8)
RBC # FLD: 20.4 % — HIGH (ref 10.3–14.5)
S AUREUS DNA NOSE QL NAA+PROBE: DETECTED
SARS-COV-2 IGG+IGM SERPL QL IA: 0.09 INDEX — SIGNIFICANT CHANGE UP
SARS-COV-2 IGG+IGM SERPL QL IA: >250 U/ML — HIGH
SARS-COV-2 IGG+IGM SERPL QL IA: NEGATIVE — SIGNIFICANT CHANGE UP
SARS-COV-2 IGG+IGM SERPL QL IA: POSITIVE
SODIUM SERPL-SCNC: 142 MMOL/L — SIGNIFICANT CHANGE UP (ref 135–145)
SPECIMEN SOURCE: SIGNIFICANT CHANGE UP
VIT B12 SERPL-MCNC: 810 PG/ML — SIGNIFICANT CHANGE UP (ref 200–900)
WBC # BLD: 5.71 K/UL — SIGNIFICANT CHANGE UP (ref 3.8–10.5)
WBC # FLD AUTO: 5.71 K/UL — SIGNIFICANT CHANGE UP (ref 3.8–10.5)

## 2021-11-16 PROCEDURE — 93306 TTE W/DOPPLER COMPLETE: CPT | Mod: 26

## 2021-11-16 PROCEDURE — 99233 SBSQ HOSP IP/OBS HIGH 50: CPT | Mod: GC

## 2021-11-16 RX ORDER — MAGNESIUM SULFATE 500 MG/ML
2 VIAL (ML) INJECTION ONCE
Refills: 0 | Status: COMPLETED | OUTPATIENT
Start: 2021-11-16 | End: 2021-11-16

## 2021-11-16 RX ORDER — IPRATROPIUM/ALBUTEROL SULFATE 18-103MCG
3 AEROSOL WITH ADAPTER (GRAM) INHALATION EVERY 6 HOURS
Refills: 0 | Status: DISCONTINUED | OUTPATIENT
Start: 2021-11-16 | End: 2021-11-17

## 2021-11-16 RX ORDER — CEFTRIAXONE 500 MG/1
1000 INJECTION, POWDER, FOR SOLUTION INTRAMUSCULAR; INTRAVENOUS EVERY 24 HOURS
Refills: 0 | Status: DISCONTINUED | OUTPATIENT
Start: 2021-11-16 | End: 2021-11-17

## 2021-11-16 RX ORDER — SODIUM,POTASSIUM PHOSPHATES 278-250MG
2 POWDER IN PACKET (EA) ORAL THREE TIMES A DAY
Refills: 0 | Status: COMPLETED | OUTPATIENT
Start: 2021-11-16 | End: 2021-11-17

## 2021-11-16 RX ORDER — FUROSEMIDE 40 MG
40 TABLET ORAL ONCE
Refills: 0 | Status: COMPLETED | OUTPATIENT
Start: 2021-11-16 | End: 2021-11-16

## 2021-11-16 RX ADMIN — CEFTRIAXONE 100 MILLIGRAM(S): 500 INJECTION, POWDER, FOR SOLUTION INTRAMUSCULAR; INTRAVENOUS at 16:15

## 2021-11-16 RX ADMIN — Medication 2 PACKET(S): at 14:20

## 2021-11-16 RX ADMIN — Medication 3 MILLILITER(S): at 03:41

## 2021-11-16 RX ADMIN — HEPARIN SODIUM 5000 UNIT(S): 5000 INJECTION INTRAVENOUS; SUBCUTANEOUS at 05:54

## 2021-11-16 RX ADMIN — Medication 2 PACKET(S): at 21:11

## 2021-11-16 RX ADMIN — BUDESONIDE AND FORMOTEROL FUMARATE DIHYDRATE 2 PUFF(S): 160; 4.5 AEROSOL RESPIRATORY (INHALATION) at 21:10

## 2021-11-16 RX ADMIN — PREGABALIN 1000 MICROGRAM(S): 225 CAPSULE ORAL at 12:00

## 2021-11-16 RX ADMIN — Medication 50 GRAM(S): at 10:41

## 2021-11-16 RX ADMIN — Medication 40 MILLIGRAM(S): at 12:00

## 2021-11-16 RX ADMIN — HEPARIN SODIUM 5000 UNIT(S): 5000 INJECTION INTRAVENOUS; SUBCUTANEOUS at 21:11

## 2021-11-16 RX ADMIN — HEPARIN SODIUM 5000 UNIT(S): 5000 INJECTION INTRAVENOUS; SUBCUTANEOUS at 14:21

## 2021-11-16 RX ADMIN — CEFEPIME 100 MILLIGRAM(S): 1 INJECTION, POWDER, FOR SOLUTION INTRAMUSCULAR; INTRAVENOUS at 05:54

## 2021-11-16 NOTE — PROGRESS NOTE ADULT - PROBLEM SELECTOR PLAN 5
Creatinine elevated to 1.3 from b/l ~0.8. Likely pre-renal / potentially cardiorenal if HF playing a role. Will trend & monitor closely.   - lasix challenge as above, CTM  - avoid nephrotoxics BNP elevated to 2.7k, which is actually elevated despite adjustment for age. May indicate cardiac stretching ico HF vs strain. Received Furosemide ico severe respiratory distress, doing well today.

## 2021-11-16 NOTE — PROGRESS NOTE ADULT - PROBLEM SELECTOR PLAN 9
Hospital Bundle  Fluids: PO ad eugenie  Electrolytes: Replete K > 4, Mg > 2, Phos > 3  Nutrition: Diet DASH  PPX  ---VTE: SQH  ---GI: N/A  ---Resp: IS  Access: PIV  Code Status: DNR/DNI (MOLST in Chart)  Dispo: Pending clinical stabilization, PT eval By report. Will assess VIT d level, adjust regimen as necessary.

## 2021-11-16 NOTE — PROGRESS NOTE ADULT - SUBJECTIVE AND OBJECTIVE BOX
PROGRESS NOTE:     Patient is a 80y old  Male who presents with a chief complaint of Acute Hypoxic Respiratory Failure (15 Nov 2021 16:33)      SUBJECTIVE / OVERNIGHT EVENTS:        MEDICATIONS  (STANDING):  albuterol/ipratropium for Nebulization 3 milliLiter(s) Nebulizer every 6 hours  budesonide 160 MICROgram(s)/formoterol 4.5 MICROgram(s) Inhaler 2 Puff(s) Inhalation two times a day  cefepime   IVPB 1000 milliGRAM(s) IV Intermittent every 8 hours  cyanocobalamin 1000 MICROGram(s) Oral daily  heparin   Injectable 5000 Unit(s) SubCutaneous every 8 hours    MEDICATIONS  (PRN):  nicotine -  14 mG/24Hr(s) Patch 1 patch Transdermal daily PRN For Tobacco Cravings only      CAPILLARY BLOOD GLUCOSE        I&O's Summary    15 Nov 2021 07:01  -  2021 07:00  --------------------------------------------------------  IN: 0 mL / OUT: 1800 mL / NET: -1800 mL        PHYSICAL EXAM:  Vital Signs Last 24 Hrs  T(C): 36.6 (2021 05:15), Max: 36.8 (15 Nov 2021 13:03)  T(F): 97.9 (2021 05:15), Max: 98.3 (15 Nov 2021 13:03)  HR: 92 (2021 05:15) (75 - 98)  BP: 118/61 (2021 05:15) (111/59 - 130/73)  BP(mean): --  RR: 18 (2021 05:15) (16 - 22)  SpO2: 98% (2021 03:55) (94% - 100%)    CONSTITUTIONAL: NAD, well-developed  RESPIRATORY: Normal respiratory effort; lungs are clear to auscultation bilaterally  CARDIOVASCULAR: Regular rate and rhythm, normal S1 and S2, no murmur/rub/gallop; No lower extremity edema; Peripheral pulses are 2+ bilaterally  ABDOMEN: Nontender to palpation, normoactive bowel sounds, no rebound/guarding; No hepatosplenomegaly  MUSCLOSKELETAL: no clubbing or cyanosis of digits; no joint swelling or tenderness to palpation  NEURO: CN 2-12 grossly intact, moves all limbs spontaneously  PSYCH: A+O to person, place, and time; affect appropriate    LABS:                        10.4   7.44  )-----------( 437      ( 15 Nov 2021 00:34 )             34.8     11-15    139  |  103  |  27<H>  ----------------------------<  110<H>  4.5   |  25  |  1.34<H>    Ca    8.7      15 Nov 2021 00:34  Phos  3.1     11-15  Mg     2.10     11-15    TPro  7.0  /  Alb  3.2<L>  /  TBili  0.7  /  DBili  x   /  AST  18  /  ALT  18  /  AlkPhos  62  11-15    PT/INR - ( 15 Nov 2021 00:34 )   PT: 15.9 sec;   INR: 1.42 ratio         PTT - ( 15 Nov 2021 00:34 )  PTT:30.5 sec      Urinalysis Basic - ( 15 Nov 2021 04:02 )    Color: Light Yellow / Appearance: Slightly Turbid / S.015 / pH: x  Gluc: x / Ketone: Negative  / Bili: Negative / Urobili: <2 mg/dL   Blood: x / Protein: Trace / Nitrite: Negative   Leuk Esterase: Moderate / RBC: 2 /HPF / WBC 33 /HPF   Sq Epi: x / Non Sq Epi: 2 /HPF / Bacteria: Negative        Culture - Blood (collected 15 Nov 2021 06:23)  Source: .Blood Blood-Peripheral  Preliminary Report (2021 07:01):    No growth to date.    Culture - Blood (collected 15 Nov 2021 06:23)  Source: .Blood Blood-Peripheral  Preliminary Report (2021 07:01):    No growth to date.        RADIOLOGY & ADDITIONAL TESTS:  Results Reviewed:   Imaging Personally Reviewed:  Electrocardiogram Personally Reviewed:    COORDINATION OF CARE:  Care Discussed with Consultants/Other Providers [Y/N]:  Prior or Outpatient Records Reviewed [Y/N]:   PROGRESS NOTE:     Patient is a 80y old  Male who presents with a chief complaint of Acute Hypoxic Respiratory Failure (15 Nov 2021 16:33)    OVERNIGHT EVENTS:  - net negative 1.8L o/n    SUBJECTIVE  Mr. Gomez says he's feeling okay this morning. He thinks his breathing is a bit better. He denies CP. He does endorse poor appetite here in the hospital but thinks that's because of the food.        MEDICATIONS  (STANDING):  albuterol/ipratropium for Nebulization 3 milliLiter(s) Nebulizer every 6 hours  budesonide 160 MICROgram(s)/formoterol 4.5 MICROgram(s) Inhaler 2 Puff(s) Inhalation two times a day  cefepime   IVPB 1000 milliGRAM(s) IV Intermittent every 8 hours  cyanocobalamin 1000 MICROGram(s) Oral daily  heparin   Injectable 5000 Unit(s) SubCutaneous every 8 hours    MEDICATIONS  (PRN):  nicotine -  14 mG/24Hr(s) Patch 1 patch Transdermal daily PRN For Tobacco Cravings only      CAPILLARY BLOOD GLUCOSE        I&O's Summary    15 Nov 2021 07:01  -  2021 07:00  --------------------------------------------------------  IN: 0 mL / OUT: 1800 mL / NET: -1800 mL        PHYSICAL EXAM:  Vital Signs Last 24 Hrs  T(C): 36.6 (2021 05:15), Max: 36.8 (15 Nov 2021 13:03)  T(F): 97.9 (2021 05:15), Max: 98.3 (15 Nov 2021 13:03)  HR: 92 (2021 05:15) (75 - 98)  BP: 118/61 (2021 05:15) (111/59 - 130/73)  BP(mean): --  RR: 18 (2021 05:15) (16 - 22)  SpO2: 98% (2021 03:55) (94% - 100%)    GEN: Awake, AOx3, NAD.  HEENT: NCAT  ---EYES: no scleral icterus, EOMI, PERRLA  CARDIO: RRR. Normal S1/S2.  RESP: CTAB in upper lung fields b/l; b/l crackles in lower fields, better aeration overall  ABD: Soft, NTND.  : No CVAT.   MSK: No obvious deformity or ROM deficit. 2+ pulses x4.   SKIN: Warm, dry. No rashes.   NEURO: Moves all four extremities spontaneously  PSYCH: Appropriate mood & affect.      LABS:                        10.4   7.44  )-----------( 437      ( 15 Nov 2021 00:34 )             34.8     11-15    139  |  103  |  27<H>  ----------------------------<  110<H>  4.5   |  25  |  1.34<H>    Ca    8.7      15 Nov 2021 00:34  Phos  3.1     11-15  Mg     2.10     11-15    TPro  7.0  /  Alb  3.2<L>  /  TBili  0.7  /  DBili  x   /  AST  18  /  ALT  18  /  AlkPhos  62  11-15    PT/INR - ( 15 Nov 2021 00:34 )   PT: 15.9 sec;   INR: 1.42 ratio         PTT - ( 15 Nov 2021 00:34 )  PTT:30.5 sec      Urinalysis Basic - ( 15 Nov 2021 04:02 )    Color: Light Yellow / Appearance: Slightly Turbid / S.015 / pH: x  Gluc: x / Ketone: Negative  / Bili: Negative / Urobili: <2 mg/dL   Blood: x / Protein: Trace / Nitrite: Negative   Leuk Esterase: Moderate / RBC: 2 /HPF / WBC 33 /HPF   Sq Epi: x / Non Sq Epi: 2 /HPF / Bacteria: Negative        Culture - Blood (collected 15 Nov 2021 06:23)  Source: .Blood Blood-Peripheral  Preliminary Report (2021 07:01):    No growth to date.    Culture - Blood (collected 15 Nov 2021 06:23)  Source: .Blood Blood-Peripheral  Preliminary Report (2021 07:01):    No growth to date.        RADIOLOGY & ADDITIONAL TESTS:  Results Reviewed:   Imaging Personally Reviewed:  Electrocardiogram Personally Reviewed:    COORDINATION OF CARE:  Care Discussed with Consultants/Other Providers [Y/N]:  Prior or Outpatient Records Reviewed [Y/N]:

## 2021-11-16 NOTE — PROGRESS NOTE ADULT - PROBLEM SELECTOR PLAN 3
Elevated Trop to 52, down to 42 w/o chest pain. Likely Type 2 NSTEMI ico demand ischemic from hypoxia. This is a relatively poor prognostic factor but does not require acute intervention at this time. Glucose 66 on morning BMP. Likely 2/2 poor PO intake based on full tray at breakfast & dinner. Encouraged PO intake, gave juice. Started on scheduled finger sticks, will avoid hypoglycemic agents.

## 2021-11-16 NOTE — PROGRESS NOTE ADULT - ATTENDING COMMENTS
Seen by me this afternoon, clinically improved, good urinary output  On NC 5 lts at time of visit with good saturation, continue to wean off O2 as tolerated  Giving 40mg IV lasix today, f/up I/O's  TTE is still pending  Etiology of acute hypoxemic respiratory failure is unclear but seems to be multifactorial-cardiac/COPD related and less likely infectious  Will c/w Abx but deescalate to IV ceftriaxone  PT eval is pending  Rest as above

## 2021-11-16 NOTE — PROGRESS NOTE ADULT - PROBLEM SELECTOR PLAN 4
BNP elevated to 2.7k, which is actually elevated despite adjustment for age. May indicate cardiac stretching ico HF vs strain BNP elevated to 2.7k, which is actually elevated despite adjustment for age. May indicate cardiac stretching ico HF vs strain. Received Furosemide ico severe respiratory distress, doing well today. Elevated Trop to 52, down to 42 w/o chest pain. Likely Type 2 NSTEMI ico demand ischemic from hypoxia. This is a relatively poor prognostic factor but does not require acute intervention at this time.

## 2021-11-16 NOTE — PROGRESS NOTE ADULT - PROBLEM SELECTOR PLAN 1
Presents with relatively acute SOB (x4d), found to have O2 sat in mid-80s with known emphysema/COPD, LE swelling. BNP elevated (even when accounting for age), trop elevated (delta > 6). DDx broad but includes cardiovascular disease (incl. HF vs cardiomyopathy 2/2 gemcitabine?), primary pulm (COPD exaerbation vs ARDS), infectious (PNA), hematologic (DVT - less likely given negative b/l [facility] Duplex & no PE on CTPA). CXR with stable opacities but new RUL lesion - with this & sterile pyuria, should consider TB though very low likelihood given no risk factors (no incarceration, no immigration from endemic region).   Diagnostics  - POCUS U/S without obvious LV/RV dysfunction, no obvious septal bowing or RHS  - I/O's  - RVP negative, PCT 0.12 (mildly elevated but nto systemic sepsis)  - ABG remarkable for a relatively low pO2 given how much high flow he's on, but otherwise totally unremarkable.  [ ] F/U BCx (NGTD), UCx (NGTD)  [ ] Quantiferon TB  [ ] MRSA Swab  [ ] TTE    Therapeutics  - C/W HFNC, may escalate to BiPAP given h/o COPD, ? volume overload  - C/W Cefepime 1g IV QD  - Holding off on Vancomycin & Anaerobic coverage pending MRSA swab result, given clinical stability  - S/P Furosemide 80mg IVP x1 Presented with relatively acute SOB (x4d), found to have O2 sat in mid-80s with known emphysema/COPD, LE swelling. BNP elevated (even when accounting for age), trop elevated (delta > 6). DDx broad but includes cardiovascular disease (incl. HF vs cardiomyopathy 2/2 gemcitabine?), primary pulm (COPD exaerbation vs ARDS), infectious (PNA), hematologic (DVT - less likely given negative b/l [facility] Duplex & no PE on CTPA). CXR with stable opacities but new RUL lesion - with this & sterile pyuria, should consider TB though very low likelihood given no risk factors (no incarceration, no immigration from endemic region).   Diagnostics  - POCUS U/S without obvious LV/RV dysfunction, no obvious septal bowing or RHS  - RVP negative, PCT 0.12 (mildly elevated but nto systemic sepsis)  - ABG remarkable for a relatively low pO2 given how much high flow he's on, but otherwise totally unremarkable.  [ ] F/U BCx (NGTD), UCx (NGTD)  [ ] Quantiferon TB  [ ] MRSA Swab  [ ] TTE    Therapeutics  - C/W HFNC, may escalate to BiPAP given h/o COPD, ? volume overload  - De-escalate ABx Cefepime --> Augmentin  - Holding off on Vancomycin & Anaerobic coverage pending MRSA swab result, given clinical stability  - S/P Furosemide 80mg IVP x1 Presented with relatively acute SOB (x4d), found to have O2 sat in mid-80s with known emphysema/COPD, LE swelling. BNP elevated (even when accounting for age), trop elevated (delta > 6). DDx broad but includes cardiovascular disease (incl. HF vs cardiomyopathy 2/2 gemcitabine?), primary pulm (COPD exaerbation vs ARDS), infectious (PNA), hematologic (DVT - less likely given negative b/l [facility] Duplex & no PE on CTPA). CXR with stable opacities but new RUL lesion - with this & sterile pyuria, should consider TB though very low likelihood given no risk factors (no incarceration, no immigration from endemic region).   Diagnostics  - POCUS U/S without obvious LV/RV dysfunction, no obvious septal bowing or RHS  - RVP negative, PCT 0.12 (mildly elevated but nto systemic sepsis)  - ABG remarkable for a relatively low pO2 given how much high flow he's on, but otherwise totally unremarkable.  [ ] F/U BCx (NGTD), UCx (NGTD)  [ ] Quantiferon TB  [ ] MRSA Swab  [ ] TTE    Therapeutics  - C/W HFNC, may escalate to BiPAP given h/o COPD, ? volume overload  - De-escalate ABx Cefepime --> CTX  - Holding off on Vancomycin & Anaerobic coverage pending MRSA swab result, given clinical stability  - S/P Furosemide 80mg IVP x1  [ ] GIVE Furosemide 40mg IV x1 today, CTM I/O

## 2021-11-16 NOTE — PROGRESS NOTE ADULT - PROBLEM SELECTOR PLAN 7
MCV ~100, anemia to 10.4 May be 2/2 chemotherapy. He should be taking B vitamins as OP but does not.   [ ] B12, Folate Identified originally via microscopic hematuria in 01/2020, formally diagnosed later. He says that he received chemo & radiation and went into remission; however, now has recurrence to "Stage 2" since 06/2021. He's now cycle 6 of Carboplatin + Gemcitabine for this with Dr. Willingham.   - Onc C/S, Appreciate Recs  - no chemo while inpatient

## 2021-11-16 NOTE — PROGRESS NOTE ADULT - PROBLEM SELECTOR PLAN 6
Identified originally via microscopic hematuria in 01/2020, formally diagnosed later. He says that he received chemo & radiation and went into remission; however, now has recurrence to "Stage 2" since 06/2021. He's now cycle 6 of Carboplatin + Gemcitabine for this with Dr. Willingham.   - Onc C/S, Appreciate Recs  - no chemo while inpatient Creatinine elevated to 1.3 from b/l ~0.8, improved to .~9 after lasix challenge yesterday. Likely some component of poor ECV in this context.  Will CTM, further diuresis as above, awaiting echo.  - lasix challenge as above, CTM  - avoid nephrotoxics

## 2021-11-16 NOTE — PROGRESS NOTE ADULT - PROBLEM SELECTOR PLAN 2
Has known COPD 2/2 smoking hx. Not obvious in exacerbation (no increased sputum, no worsened cough) despite hypoxia.  - C/W Home Advair BID  - Goal O2 88-92% (does not need to be > 94%) Has known COPD 2/2 smoking hx. Not obvious in exacerbation (no increased sputum, no worsened cough) despite hypoxia. Received ATC Duonebs Q6H, can likely de-escalate.   - C/W Home Advair BID  - De-escalate Duonebs to PRN Nebs/MDI  - Goal O2 88-92% (does not need to be > 94%) Has known COPD 2/2 smoking hx. Not obvious in exacerbation (no increased sputum, no worsened cough) despite hypoxia. Received ATC Duonebs Q6H, can likely de-escalate.   - C/W Home Advair BID  - De-escalate Duonebs to PRN Nebs  - Goal O2 88-92% (does not need to be > 94%)

## 2021-11-16 NOTE — PROGRESS NOTE ADULT - PROBLEM SELECTOR PLAN 8
By report. Will assess VIT d level, adjust regimen as necessary. MCV ~100, anemia to 10.4 May be 2/2 chemotherapy. He should be taking B vitamins as OP but does not. B12 & Folate WNL. CTM

## 2021-11-17 ENCOUNTER — TRANSCRIPTION ENCOUNTER (OUTPATIENT)
Age: 81
End: 2021-11-17

## 2021-11-17 LAB
ALBUMIN SERPL ELPH-MCNC: 3 G/DL — LOW (ref 3.3–5)
ALP SERPL-CCNC: 60 U/L — SIGNIFICANT CHANGE UP (ref 40–120)
ALT FLD-CCNC: 14 U/L — SIGNIFICANT CHANGE UP (ref 4–41)
ANION GAP SERPL CALC-SCNC: 7 MMOL/L — SIGNIFICANT CHANGE UP (ref 7–14)
AST SERPL-CCNC: 16 U/L — SIGNIFICANT CHANGE UP (ref 4–40)
BASE EXCESS BLDV CALC-SCNC: 12.4 MMOL/L — HIGH (ref -2–3)
BASOPHILS # BLD AUTO: 0.02 K/UL — SIGNIFICANT CHANGE UP (ref 0–0.2)
BASOPHILS NFR BLD AUTO: 0.4 % — SIGNIFICANT CHANGE UP (ref 0–2)
BILIRUB SERPL-MCNC: 0.6 MG/DL — SIGNIFICANT CHANGE UP (ref 0.2–1.2)
BLOOD GAS VENOUS COMPREHENSIVE RESULT: SIGNIFICANT CHANGE UP
BUN SERPL-MCNC: 12 MG/DL — SIGNIFICANT CHANGE UP (ref 7–23)
CALCIUM SERPL-MCNC: 8.7 MG/DL — SIGNIFICANT CHANGE UP (ref 8.4–10.5)
CHLORIDE BLDV-SCNC: 100 MMOL/L — SIGNIFICANT CHANGE UP (ref 96–108)
CHLORIDE SERPL-SCNC: 100 MMOL/L — SIGNIFICANT CHANGE UP (ref 98–107)
CO2 BLDV-SCNC: 41.3 MMOL/L — HIGH (ref 22–26)
CO2 SERPL-SCNC: 36 MMOL/L — HIGH (ref 22–31)
CREAT SERPL-MCNC: 0.99 MG/DL — SIGNIFICANT CHANGE UP (ref 0.5–1.3)
EOSINOPHIL # BLD AUTO: 0.06 K/UL — SIGNIFICANT CHANGE UP (ref 0–0.5)
EOSINOPHIL NFR BLD AUTO: 1.1 % — SIGNIFICANT CHANGE UP (ref 0–6)
GAS PNL BLDV: 138 MMOL/L — SIGNIFICANT CHANGE UP (ref 136–145)
GAS PNL BLDV: SIGNIFICANT CHANGE UP
GLUCOSE BLDC GLUCOMTR-MCNC: 107 MG/DL — HIGH (ref 70–99)
GLUCOSE BLDC GLUCOMTR-MCNC: 70 MG/DL — SIGNIFICANT CHANGE UP (ref 70–99)
GLUCOSE BLDC GLUCOMTR-MCNC: 85 MG/DL — SIGNIFICANT CHANGE UP (ref 70–99)
GLUCOSE BLDC GLUCOMTR-MCNC: 91 MG/DL — SIGNIFICANT CHANGE UP (ref 70–99)
GLUCOSE BLDC GLUCOMTR-MCNC: 97 MG/DL — SIGNIFICANT CHANGE UP (ref 70–99)
GLUCOSE BLDV-MCNC: 81 MG/DL — SIGNIFICANT CHANGE UP (ref 70–99)
GLUCOSE SERPL-MCNC: 76 MG/DL — SIGNIFICANT CHANGE UP (ref 70–99)
HCO3 BLDV-SCNC: 39 MMOL/L — HIGH (ref 22–29)
HCT VFR BLD CALC: 34.7 % — LOW (ref 39–50)
HCT VFR BLDA CALC: 29 % — LOW (ref 39–51)
HGB BLD CALC-MCNC: 9.6 G/DL — LOW (ref 13–17)
HGB BLD-MCNC: 10.4 G/DL — LOW (ref 13–17)
IANC: 4.45 K/UL — SIGNIFICANT CHANGE UP (ref 1.5–8.5)
IMM GRANULOCYTES NFR BLD AUTO: 0.4 % — SIGNIFICANT CHANGE UP (ref 0–1.5)
LACTATE BLDV-MCNC: 2.6 MMOL/L — HIGH (ref 0.5–2)
LYMPHOCYTES # BLD AUTO: 0.69 K/UL — LOW (ref 1–3.3)
LYMPHOCYTES # BLD AUTO: 12.8 % — LOW (ref 13–44)
MAGNESIUM SERPL-MCNC: 1.8 MG/DL — SIGNIFICANT CHANGE UP (ref 1.6–2.6)
MCHC RBC-ENTMCNC: 28.9 PG — SIGNIFICANT CHANGE UP (ref 27–34)
MCHC RBC-ENTMCNC: 30 GM/DL — LOW (ref 32–36)
MCV RBC AUTO: 96.4 FL — SIGNIFICANT CHANGE UP (ref 80–100)
MONOCYTES # BLD AUTO: 0.16 K/UL — SIGNIFICANT CHANGE UP (ref 0–0.9)
MONOCYTES NFR BLD AUTO: 3 % — SIGNIFICANT CHANGE UP (ref 2–14)
NEUTROPHILS # BLD AUTO: 4.45 K/UL — SIGNIFICANT CHANGE UP (ref 1.8–7.4)
NEUTROPHILS NFR BLD AUTO: 82.3 % — HIGH (ref 43–77)
NRBC # BLD: 0 /100 WBCS — SIGNIFICANT CHANGE UP
NRBC # FLD: 0 K/UL — SIGNIFICANT CHANGE UP
PCO2 BLDV: 65 MMHG — HIGH (ref 42–55)
PH BLDV: 7.39 — SIGNIFICANT CHANGE UP (ref 7.32–7.43)
PHOSPHATE SERPL-MCNC: 2.7 MG/DL — SIGNIFICANT CHANGE UP (ref 2.5–4.5)
PLATELET # BLD AUTO: 397 K/UL — SIGNIFICANT CHANGE UP (ref 150–400)
PO2 BLDV: 58 MMHG — SIGNIFICANT CHANGE UP
POTASSIUM BLDV-SCNC: 3.8 MMOL/L — SIGNIFICANT CHANGE UP (ref 3.5–5.1)
POTASSIUM SERPL-MCNC: 4 MMOL/L — SIGNIFICANT CHANGE UP (ref 3.5–5.3)
POTASSIUM SERPL-SCNC: 4 MMOL/L — SIGNIFICANT CHANGE UP (ref 3.5–5.3)
PROT SERPL-MCNC: 6.4 G/DL — SIGNIFICANT CHANGE UP (ref 6–8.3)
RBC # BLD: 3.6 M/UL — LOW (ref 4.2–5.8)
RBC # FLD: 20.1 % — HIGH (ref 10.3–14.5)
SAO2 % BLDV: 88.5 % — SIGNIFICANT CHANGE UP
SODIUM SERPL-SCNC: 143 MMOL/L — SIGNIFICANT CHANGE UP (ref 135–145)
WBC # BLD: 5.4 K/UL — SIGNIFICANT CHANGE UP (ref 3.8–10.5)
WBC # FLD AUTO: 5.4 K/UL — SIGNIFICANT CHANGE UP (ref 3.8–10.5)

## 2021-11-17 PROCEDURE — 99223 1ST HOSP IP/OBS HIGH 75: CPT | Mod: GC

## 2021-11-17 PROCEDURE — 99233 SBSQ HOSP IP/OBS HIGH 50: CPT

## 2021-11-17 PROCEDURE — 99233 SBSQ HOSP IP/OBS HIGH 50: CPT | Mod: GC

## 2021-11-17 PROCEDURE — 99406 BEHAV CHNG SMOKING 3-10 MIN: CPT | Mod: GC

## 2021-11-17 RX ORDER — ENOXAPARIN SODIUM 100 MG/ML
40 INJECTION SUBCUTANEOUS AT BEDTIME
Refills: 0 | Status: DISCONTINUED | OUTPATIENT
Start: 2021-11-17 | End: 2021-11-18

## 2021-11-17 RX ORDER — IPRATROPIUM/ALBUTEROL SULFATE 18-103MCG
3 AEROSOL WITH ADAPTER (GRAM) INHALATION EVERY 6 HOURS
Refills: 0 | Status: DISCONTINUED | OUTPATIENT
Start: 2021-11-17 | End: 2021-11-18

## 2021-11-17 RX ORDER — SODIUM,POTASSIUM PHOSPHATES 278-250MG
1 POWDER IN PACKET (EA) ORAL THREE TIMES A DAY
Refills: 0 | Status: COMPLETED | OUTPATIENT
Start: 2021-11-17 | End: 2021-11-17

## 2021-11-17 RX ORDER — FUROSEMIDE 40 MG
20 TABLET ORAL ONCE
Refills: 0 | Status: COMPLETED | OUTPATIENT
Start: 2021-11-17 | End: 2021-11-17

## 2021-11-17 RX ORDER — PANTOPRAZOLE SODIUM 20 MG/1
40 TABLET, DELAYED RELEASE ORAL
Refills: 0 | Status: DISCONTINUED | OUTPATIENT
Start: 2021-11-17 | End: 2021-11-17

## 2021-11-17 RX ORDER — MAGNESIUM SULFATE 500 MG/ML
2 VIAL (ML) INJECTION ONCE
Refills: 0 | Status: COMPLETED | OUTPATIENT
Start: 2021-11-17 | End: 2021-11-17

## 2021-11-17 RX ADMIN — Medication 20 MILLIGRAM(S): at 13:27

## 2021-11-17 RX ADMIN — Medication 2 PACKET(S): at 06:28

## 2021-11-17 RX ADMIN — Medication 1 PACKET(S): at 21:15

## 2021-11-17 RX ADMIN — Medication 3 MILLILITER(S): at 21:55

## 2021-11-17 RX ADMIN — ENOXAPARIN SODIUM 40 MILLIGRAM(S): 100 INJECTION SUBCUTANEOUS at 21:15

## 2021-11-17 RX ADMIN — BUDESONIDE AND FORMOTEROL FUMARATE DIHYDRATE 2 PUFF(S): 160; 4.5 AEROSOL RESPIRATORY (INHALATION) at 10:05

## 2021-11-17 RX ADMIN — Medication 1 PACKET(S): at 13:27

## 2021-11-17 RX ADMIN — BUDESONIDE AND FORMOTEROL FUMARATE DIHYDRATE 2 PUFF(S): 160; 4.5 AEROSOL RESPIRATORY (INHALATION) at 21:16

## 2021-11-17 RX ADMIN — Medication 50 GRAM(S): at 08:34

## 2021-11-17 RX ADMIN — HEPARIN SODIUM 5000 UNIT(S): 5000 INJECTION INTRAVENOUS; SUBCUTANEOUS at 06:28

## 2021-11-17 RX ADMIN — PREGABALIN 1000 MICROGRAM(S): 225 CAPSULE ORAL at 13:27

## 2021-11-17 NOTE — PHYSICAL THERAPY INITIAL EVALUATION ADULT - ADDITIONAL COMMENTS
Pt did not perform stair training as SpO2 is at 80% at 6LO2 NC during ambulation, hence only limited activities performed during PT evaluation.

## 2021-11-17 NOTE — PROGRESS NOTE ADULT - SUBJECTIVE AND OBJECTIVE BOX
SUBJECTIVE AND OBJECTIVE:  Patient seen with wife at bedside. Patient denies any acute complaints. Reports he feels better than when he initially was first hospitalized.   Wife at bedside states she has contacted outpatient oncology office and will speak further with outpatient provider concerning his DMT regimen.     INTERVAL HPI/OVERNIGHT EVENTS:  Patient weaned off HFNC to NC     DNR on chart:   Allergies    allergic to cefobid, unknown reaction (Other)  penicillin (Rash)    Intolerances    MEDICATIONS  (STANDING):  albuterol/ipratropium for Nebulization 3 milliLiter(s) Nebulizer every 6 hours  budesonide 160 MICROgram(s)/formoterol 4.5 MICROgram(s) Inhaler 2 Puff(s) Inhalation two times a day  cyanocobalamin 1000 MICROGram(s) Oral daily  enoxaparin Injectable 40 milliGRAM(s) SubCutaneous at bedtime  potassium phosphate / sodium phosphate Powder (PHOS-NaK) 1 Packet(s) Oral three times a day  predniSONE   Tablet 40 milliGRAM(s) Oral daily    MEDICATIONS  (PRN):  nicotine -  14 mG/24Hr(s) Patch 1 patch Transdermal daily PRN For Tobacco Cravings only      ITEMS UNCHECKED ARE NOT PRESENT    PRESENT SYMPTOMS: [ ]Unable to obtain due to poor mentation   Source if other than patient:  [ ]Family   [ ]Team     Pain:  [ ]yes [x ]no  QOL impact -   Location -                    Aggravating factors -  Quality -  Radiation -  Timing-  Severity (0-10 scale):  Minimal acceptable level (0-10 scale):     Dyspnea:                           [ ]Mild [ ]Moderate [ ]Severe  Anxiety:                             [ ]Mild [ ]Moderate [ ]Severe  Agitation:                          [ ]Mild [ ]Moderate [ ]Severe  Fatigue:                             [ ]Mild [ ]Moderate [ ]Severe  Nausea:                             [ ]Mild [ ]Moderate [ ]Severe  Loss of appetite:              [ ]Mild [ ]Moderate [ ]Severe  Constipation:                   [ ]Mild [ ]Moderate [ ]Severe  Diarrhea:                          [ ]Mild [ ]Moderate [ ]Severe      CPOT:    https://www.[email or web address]/getattachment/[email or web address]/Critical-Care-Pain-Observation-Tool-(CPOT)    PAIN AD Score:	  [email or web address].[email or web address]/painad.pdf (Ctrl + left click to view)    Other Symptoms:  [x ]All other review of systems negative     Palliative Performance Status Version 2:   70-80      %      [email or web address]/files/news/palliative_performance_scale_ppsv2.pdf    PHYSICAL EXAM:  Vital Signs Last 24 Hrs  T(C): 37.1 (17 Nov 2021 13:25), Max: 37.1 (17 Nov 2021 13:25)  T(F): 98.8 (17 Nov 2021 13:25), Max: 98.8 (17 Nov 2021 13:25)  HR: 94 (17 Nov 2021 13:39) (91 - 94)  BP: 111/64 (17 Nov 2021 13:39) (104/67 - 124/60)  BP(mean): --  RR: 19 (17 Nov 2021 13:39) (16 - 19)  SpO2: 95% (17 Nov 2021 13:39) (92% - 97%)     I&O's Summary    16 Nov 2021 07:01  -  17 Nov 2021 07:00  --------------------------------------------------------  IN: 0 mL / OUT: 1500 mL / NET: -1500 mL         GENERAL:  [ x]Alert  [ x]Oriented x  3 [ ]Lethargic  [ ]Cachexia  [ ]Unarousable  [ x]Verbal  [ ]Non-Verbal  [ x] No Distress  Behavioral:   [ ] Anxiety  [ ] Delirium [ ] Agitation [ x] Calm  [ ] Other  HEENT:  [x ]Normal  [ ] Temporal Wasting  [ ]Dry mouth   [ ]ET Tube/Trach  [ ]Oral lesions  [ ] Mucositis  PULMONARY:   [ ]Clear [ ]Tachypnea  [ ]Audible excessive secretions   [ ]Rhonchi        [ ]Right [ ]Left [ ]Bilateral  [ ]Crackles        [ ]Right [ ]Left [ ]Bilateral  [ ]Wheezing     [ ]Right [ ]Left [ ]Bilateral  [x ]Diminished breath sounds [ ]right [ ]left [x ]bilateral  CARDIOVASCULAR:    [ x]Regular [ ]Irregular [ ]Tachy  [ ]Gerald [ ]Murmur [ ]Other  GASTROINTESTINAL:  [ x]Soft  [ ]Distended   [ ]+BS  [x ]Non tender [ ]Tender  [ ]PEG [ ]OGT/ NGT  Last BM: 11/16/21  GENITOURINARY:  [ x]Normal [ ] Incontinent   [ ]Oliguria/Anuria   [ ]Roberts  MUSCULOSKELETAL:   [ x]Normal   [ ]Weakness  [ ]Bed/Wheelchair bound [ ]Edema  [  ] amputation  [  ] contraction  NEUROLOGIC:   [ x]No focal deficits  [ ]Cognitive impairment  [ ]Dysphagia [ ]Dysarthria [ ]Paresis [ ]Other   SKIN: See Nursing Skin Assessment for further details  [x ]Normal    [ ]Rash  [ ]Pressure ulcer(s)       Present on admission [ ]y [ ]n   [  ]  Wound    [  ] hyperpigmentation    CRITICAL CARE:  [ ] Shock Present  [ ]Septic [ ]Cardiogenic [ ]Neurologic [ ]Hypovolemic  [ ]  Vasopressors [ ]  Inotropes   [x ]Respiratory failure present [ ]Mechanical ventilation [x ]Non-invasive ventilatory support- NC ]High flow    [ x]Acute  [ ]Chronic [x ]Hypoxic  [ ]Hypercarbic [ ]Other  [ ]Other organ failure       LABS:                        10.4   5.40  )-----------( 397      ( 17 Nov 2021 06:26 )             34.7   11-17    143  |  100  |  12  ----------------------------<  76  4.0   |  36<H>  |  0.99    Ca    8.7      17 Nov 2021 06:26  Phos  2.7     11-17  Mg     1.80     11-17    TPro  6.4  /  Alb  3.0<L>  /  TBili  0.6  /  DBili  x   /  AST  16  /  ALT  14  /  AlkPhos  60  11-17      CAPILLARY BLOOD GLUCOSE      POCT Blood Glucose.: 107 mg/dL (17 Nov 2021 16:39)  POCT Blood Glucose.: 70 mg/dL (17 Nov 2021 11:20)  POCT Blood Glucose.: 97 mg/dL (17 Nov 2021 07:52)  POCT Blood Glucose.: 85 mg/dL (17 Nov 2021 03:02)  POCT Blood Glucose.: 147 mg/dL (16 Nov 2021 21:34)    RADIOLOGY & ADDITIONAL STUDIES:  TTE 11/16/2021  CONCLUSIONS:  1. Mitral annular calcification, otherwise normal mitral  valve. Minimal mitral regurgitation.  2. Normal left ventricular internal dimensions and wall  thicknesses.  3. Endocardium not well visualized; grossly normal left  ventricular systolic function.  4. Right ventricular enlargement with decreased right  ventricular systolic function.  5. Estimated right ventricular systolic pressure equals 58  mm Hg, assuming right atrial pressure equals 10 mm Hg,  consistent with moderate pulmonary hypertension.    Protein Calorie Malnutrition Present: [ ]mild [ ]moderate [ ]severe [ ]underweight [ ]morbid obesity  https://www.[facility].org/vault/2440/web/files/ONC/Table_Clinical%20Characteristics%20to%20Document%20Malnutrition-White%20JV%20et%20al%643458.pdf    Height (cm): 182.9 (11-14-21 @ 22:42)  Weight (kg): 69 (11-17-21 @ 08:30)  BMI (kg/m2): 20.6 (11-17-21 @ 08:30)    [ ]PPSV2 < or = 30%  [ ]significant weight loss [ ]poor nutritional intake [ ]anasarca    [ ]Artificial Nutrition    REFERRALS:   [ ]Chaplaincy  [ ]Hospice  [ ]Child Life  [ ]Social Work  [x ]Case management [ ]Holistic Therapy

## 2021-11-17 NOTE — DISCHARGE NOTE PROVIDER - HOSPITAL COURSE
OUTSTANDING ISSUES AT DISCHARGE  PCP    Pulmonology    Oncology    HOSPITAL COURSE  ..     OUTSTANDING ISSUES AT DISCHARGE  PCP  #    Pulmonology  #COPD  [ ] To complete Prednisone 11/21/2021.     #pHTN  [ ] FYI: TTE with c/f pHTN (results below),     Oncology  #Bladder Cancer with new RUL Nodular Opacity & GGO's  [ ] ?Progression of Disease, pt not interested in biopsy during this hospitalization. Consider OP palliative care / hospice if treatment withdrawn    RELEVANT IMAGING / STUDY RESULTS  < from: CT Angio Chest PE Protocol w/ IV Cont (11.15.21 @ 03:42) >  FINDINGS:  LUNGS AND AIRWAYS: Patentcentral airways. Moderate to severe emphysema. Again noted, postsurgical changes of the right lung. Redemonstrated severe emphysema. Mostly stable, nodular opacities and masses, some of which are calcified. For example, left lower lobe calcified mass measures 3.2 x 3.1 cm (2:7). New 1.5 x 1.3 cm right upper lobe nodular opacity (2:31). Minimally increased groundglass opacities since prior study. Compressive atelectasis of the right lung.  PLEURA: Small right pleural effusion.  MEDIASTINUM AND ARNEL: Subcentimeter lymph nodes without lymphadenopathy.  VESSELS: Suboptimal contrast opacification of the peripheral pulmonary arteries. No obvious central pulmonary embolus or secondary signs of right heart strain. Stable main pulmonary artery enlargement. Atherosclerosis. Normal caliber of the thoracic aorta. Bovine arch.  HEART: Normal heart size. Trace pericardial effusion.  CHEST WALL AND LOWER NECK: Anasarca. Bilateral gynecomastia.  VISUALIZED UPPER ABDOMEN: Hiatal hernia again noted. Cholecystectomy. Nonspecific bilateral perinephric stranding. Right renal cyst partially visualized.   BONES: Degenerative changes.    IMPRESSION:  1. Suboptimal evaluation of the peripheral pulmonary arteries. No central pulmonary embolus or secondary signsof right heart strain.  2. Mostly stable, nodular opacities and masses, some of which are calcified. New 1.5 x 1.3 cm right upper lobe nodular opacity.  3. Additional findings as described.  < end of copied text >    < from: Transthoracic Echocardiogram (11.16.21 @ 16:52) >  DIMENSIONS:  Dimensions:     Normal Values:  LA:     3.1 cm    2.0 - 4.0 cm  Ao:     3.1 cm    2.0 - 3.8 cm  SEPTUM: 0.7 cm    0.6 -1.2 cm  PWT:    0.7 cm    0.6 - 1.1 cm  LVIDd:  5.2 cm    3.0 - 5.6 cm  LVIDs:  3.5 cm    1.8 - 4.0 cm  Derived Variables:  LVMI: 59 g/m2  RWT: 0.26  Fractional short: 33 %  Ejection Fraction (Modified Zuniga Rule): 61 %  ------------------------------------------------------------------------  OBSERVATIONS:  Mitral Valve: Mitral annular calcification, otherwise  normal mitral valve. Minimal mitral regurgitation.  Aortic Root: Normal aortic root.  Aortic Valve: Aortic valve leaflet morphology not well  visualized.  Left Atrium: Normal left atrium.  Left Ventricle: Endocardium not well visualized; grossly  normal left ventricular systolic function. Normal left  ventricular internal dimensions and wall thicknesses.  Right Heart: Mild right atrial enlargement. Right  ventricular enlargement with decreased right ventricular  systolic function. Normal tricuspid valve.  Mild tricuspid  regurgitation. Normal pulmonic valve.  Pericardium/PleuraNormal pericardium with no pericardial  effusion.  Hemodynamic: Estimated right ventricular systolic pressure  equals 58 mm Hg, assuming right atrial pressure equals 10  mm Hg, consistent with moderate pulmonary hypertension.  ------------------------------------------------------------------------  CONCLUSIONS:  1. Mitral annular calcification, otherwise normal mitral  valve. Minimal mitral regurgitation.  2. Normal left ventricular internal dimensions and wall  thicknesses.  3. Endocardium not well visualized; grossly normal left  ventricular systolic function.  4. Right ventricular enlargement with decreased right  ventricular systolic function.  5. Estimated right ventricular systolic pressure equals 58  mm Hg, assuming right atrial pressure equals 10 mm Hg,  consistent with moderate pulmonary hypertension.  < end of copied text >    HOSPITAL COURSE  Mr. Gomez is a 80 year old man with Bladder Cancer (initially identified by hematuria 01/2020, went into remission s/p chemo & radiation, with recurrence in 06/2021 now on C6 D7 of carboplatin + gemcitabine (11/18/2021)) who presented with dyspnea, hypoxia (to mid 80's, requiring HFNC), and mild peripheral edema without leukocytosis, worsening cough/sputum preduction, though with one fever at home. Due to initial concerns for possible PE given his prothrombotic state (active cancer), CTPA & bilateral duplex U/S were performed, which did not show evidence of massive PE or DVTs; however, there is evidence of new 1.5 x 1.3cm RUL nodular opacity. For this, he was managed initially as a potential PNA/COPD exacerbation; however, he was escalated to maximum HFNC settings despite initial management. For this and seeing the peripheral edema and concern for vascular congestion on CXR, he was aggressively diuresed with symptomatic improvement. Subsequent TTE showed right-sided systolic dysfunction and moderate pulmonary HTN. Overall, it seems that his acute hypoxic respiratory failure is multifactorial, involving not only his COPD but also right-sided HF ico pHTN as a consequence of this. He also has some evidence of new anatomic pulm findings, including a new RUL nodular opacity that may be c/f progression of his underlying oncologic disease. He did not want this biopsied for further assessment. Ultimately, he continued to require oxygen supplementation to maintain saturation > 88%.      During this hospitalization, discussion was had with him regarding his GOC; he did not want to be intubated, and he did not want chest compressions performed in the event that his heart were to stop functioning appropriately.     OUTSTANDING ISSUES AT DISCHARGE  PCP  #    Pulmonology  #COPD  [ ] To complete Prednisone 11/21/2021.     #pHTN  [ ] FYI: TTE with c/f pHTN (results below),     Oncology  #Bladder Cancer with new RUL Nodular Opacity & GGO's  [ ] ?Progression of Disease, pt not interested in biopsy during this hospitalization. Consider OP palliative care / hospice if treatment withdrawn      HOSPITAL COURSE  Mr. Gomez is a 80 year old man with Bladder Cancer (initially identified by hematuria 01/2020, went into remission s/p chemo & radiation, with recurrence in 06/2021 now on C6 D7 of carboplatin + gemcitabine (11/18/2021)) who presented with dyspnea, hypoxia (to mid 80's, requiring HFNC), and mild peripheral edema without leukocytosis, worsening cough/sputum preduction, though with one fever at home. Due to initial concerns for possible PE given his prothrombotic state (active cancer), CTPA & bilateral duplex U/S were performed, which did not show evidence of massive PE or DVTs; however, there is evidence of new 1.5 x 1.3cm RUL nodular opacity. For this, he was managed initially as a potential PNA/COPD exacerbation; however, he was escalated to maximum HFNC settings despite initial management. For this and seeing the peripheral edema and concern for vascular congestion on CXR, he was aggressively diuresed with symptomatic improvement. Subsequent TTE showed right-sided systolic dysfunction and moderate pulmonary HTN. Overall, it seems that his acute hypoxic respiratory failure is multifactorial, involving not only his COPD but also right-sided HF ico pHTN as a consequence of this. He also has some evidence of right ventricular systolic dysfunction. Pulmonology was consulted and he was started on a 5 day course of steroids for possible COPD exacerbation. Patient with some improvement but ultimately, he continued to require oxygen supplementation to maintain saturation > 88%.    During this hospitalization, discussion was had with him regarding his GOC; he did not want to be intubated, and he did not want chest compressions performed in the event that his heart were to stop functioning appropriately.    On 11/**, patient set up with home oxygen, afebrile, hemodynamically stable, and ready for discharge home with outpatient follow up.     OUTSTANDING ISSUES AT DISCHARGE  PCP  #Anemia  [ ] Found to be relatively iron deficient TSAT 14%, Fe 40, TIBC 227, Ferritin 308. Started Ferrous sulfate 325mg PO QAD. Follow-up labs in ~3 months.    Pulmonology  #COPD  [ ] To complete Prednisone 11/21/2021.     #pHTN  [ ] FYI: TTE with c/f pHTN (results below),     Oncology  #Bladder Cancer with new RUL Nodular Opacity & GGO's  [ ] ?Progression of Disease, pt not interested in biopsy during this hospitalization. Consider OP palliative care / hospice if treatment withdrawn      HOSPITAL COURSE  Mr. Gomez is a 80 year old man with Bladder Cancer (initially identified by hematuria 01/2020, went into remission s/p chemo & radiation, with recurrence in 06/2021 now on C6 D7 of carboplatin + gemcitabine (11/18/2021)) who presented with dyspnea, hypoxia (to mid 80's, requiring HFNC), and mild peripheral edema without leukocytosis, worsening cough/sputum preduction, though with one fever at home. Due to initial concerns for possible PE given his prothrombotic state (active cancer), CTPA & bilateral duplex U/S were performed, which did not show evidence of massive PE or DVTs; however, there is evidence of new 1.5 x 1.3cm RUL nodular opacity. For this, he was managed initially as a potential PNA/COPD exacerbation; however, he was escalated to maximum HFNC settings despite initial management. For this and seeing the peripheral edema and concern for vascular congestion on CXR, he was aggressively diuresed with symptomatic improvement. Subsequent TTE showed right-sided systolic dysfunction and moderate pulmonary HTN. Overall, it seems that his acute hypoxic respiratory failure is multifactorial, involving not only his COPD but also right-sided HF ico pHTN as a consequence of this. He also has some evidence of right ventricular systolic dysfunction. Pulmonology was consulted and he was started on a 5 day course of steroids for possible COPD exacerbation. Patient with some improvement but ultimately, he continued to require oxygen supplementation to maintain saturation > 88%.    During this hospitalization, discussion was had with him regarding his GOC; he did not want to be intubated, and he did not want chest compressions performed in the event that his heart were to stop functioning appropriately.    On 11/**, patient set up with home oxygen, afebrile, hemodynamically stable, and ready for discharge home with outpatient follow up.     OUTSTANDING ISSUES AT DISCHARGE  PCP  #Anemia  [ ] Found to be relatively iron deficient TSAT 14%, Fe 40, TIBC 227, Ferritin 308. Started Ferrous sulfate 325mg PO QAD. Follow-up labs in ~3 months.    Pulmonology  #COPD  [ ] To complete Prednisone 11/21/2021.     #pHTN  [ ] FYI: TTE with c/f pHTN (results below),     Oncology  #Bladder Cancer with new RUL Nodular Opacity & GGO's  [ ] ?Progression of Disease, pt not interested in biopsy during this hospitalization. Consider OP palliative care / hospice if treatment withdrawn      HOSPITAL COURSE  Mr. Gomez is a 80 year old man with Bladder Cancer (initially identified by hematuria 01/2020, went into remission s/p chemo & radiation, with recurrence in 06/2021 now on C6 D7 of carboplatin + gemcitabine (11/18/2021)) who presented with dyspnea, hypoxia (to mid 80's, requiring HFNC), and mild peripheral edema without leukocytosis, worsening cough/sputum preduction, though with one fever at home. Due to initial concerns for possible PE given his prothrombotic state (active cancer), CTPA & bilateral duplex U/S were performed, which did not show evidence of massive PE or DVTs; however, there is evidence of new 1.5 x 1.3cm RUL nodular opacity. For this, he was managed initially as a potential PNA/COPD exacerbation; however, he was escalated to maximum HFNC settings despite initial management. For this and seeing the peripheral edema and concern for vascular congestion on CXR, he was aggressively diuresed with symptomatic improvement. Subsequent TTE showed right-sided systolic dysfunction and moderate pulmonary HTN. Overall, it seems that his acute hypoxic respiratory failure is multifactorial, involving not only his COPD but also right-sided HF ico pHTN as a consequence of this. He also has some evidence of right ventricular systolic dysfunction. Pulmonology was consulted and he was started on a 5 day course of steroids for possible COPD exacerbation. Patient with some improvement but ultimately, he continued to require oxygen supplementation to maintain saturation > 88%.    During this hospitalization, discussion was had with him regarding his GOC; he did not want to be intubated, and he did not want chest compressions performed in the event that his heart were to stop functioning appropriately.    Patient set up with home oxygen, afebrile, hemodynamically stable, and ready for discharge home with outpatient follow up.

## 2021-11-17 NOTE — PROGRESS NOTE ADULT - PROBLEM SELECTOR PLAN 3
Glucose 66 on morning BMP. Likely 2/2 poor PO intake based on full tray at breakfast & dinner. Encouraged PO intake, gave juice. Started on scheduled finger sticks, will avoid hypoglycemic agents. Glucose 66 on morning BMP. (11/16/2021) Likely 2/2 poor PO intake based on full tray at breakfast & dinner. Encouraged PO intake, gave juice. Started on scheduled finger sticks, will avoid hypoglycemic agents.

## 2021-11-17 NOTE — PROGRESS NOTE ADULT - PROBLEM SELECTOR PLAN 3
Patient is on carboplatin + gemcitabine. Last chemotherapy administration was on November 12th, 2021.   Oncology recommendations appreciated: No treatment while inpatient.  Patient plans to f/u with Dr. Willingham outpatient about further DMT

## 2021-11-17 NOTE — PROGRESS NOTE ADULT - PROBLEM SELECTOR PLAN 2
Has known COPD 2/2 smoking hx. Not obvious in exacerbation (no increased sputum, no worsened cough) despite hypoxia. Received ATC Duonebs Q6H, can likely de-escalate.   - C/W Home Advair BID  - C/W DuoNebs Q6H PRN  - Goal O2 88-92% (does not need to be > 94%) Has known COPD 2/2 smoking hx. Not obvious in exacerbation (no increased sputum, no worsened cough) despite hypoxia. Received ATC Duonebs Q6H previously. Not sure what his ideal regimen would be outside the hospital.  - C/W Home Advair BID  - C/W DuoNebs Q6H PRN  - Goal O2 88-92% (does not need to be > 94%)

## 2021-11-17 NOTE — DISCHARGE NOTE PROVIDER - PROVIDER TOKENS
PROVIDER:[TOKEN:[[id number]],FOLLOWUP:[1-3 days],ESTABLISHEDPATIENT:[T]],PROVIDER:[TOKEN:[[id number]],FOLLOWUP:[1-3 days],ESTABLISHEDPATIENT:[T]]

## 2021-11-17 NOTE — DISCHARGE NOTE PROVIDER - NSDCCPCAREPLAN_GEN_ALL_CORE_FT
PRINCIPAL DISCHARGE DIAGNOSIS  Diagnosis: Sepsis with acute hypoxic respiratory failure  Assessment and Plan of Treatment:        PRINCIPAL DISCHARGE DIAGNOSIS  Diagnosis: Acute respiratory failure with hypoxia  Assessment and Plan of Treatment: This is likely 2/2 COPD exacerbation (though no persistent wheeze, cough, or sputum production) comorbid with right ventricular systolic dysfunction, which itself is secondary to pHTN from the COPD. He showed improvement in his lactate and oxygen requirements after diuresis, DuoNebs, and steroids. He was not initiated on Azithromycin as COPD exacerbation not considered highly likely. He was started on a 5 day course of Prednisone burst in consultation with house pulmonology.   - To complete Prednisone 40mg by mouth every day 11/21/2021  - To continue Advair 2 puffs twice per day  - To start spiriva 18ug (1 capsule) inhaled once per day  - To start DuoNebs every 6 hours AS NEEDED only for wheezing / shortness of breath  - To start home O2 supplementation - counseled to stop smoking and DEFINITELY DO NOT SMOKE WITH OXYGEN TANK      SECONDARY DISCHARGE DIAGNOSES  Diagnosis: Emphysema of lung  Assessment and Plan of Treatment: You have "emphysema" or COPD. You should use your inhalers and take your other medications as prescribed to prevent this from getting worse. You should schedule an appointment with your outpatient Pulmonologist Dr. Aleman to manage this problem long-term.    Diagnosis: Congestive heart failure with right ventricular systolic dysfunction  Assessment and Plan of Treatment: You came in with difficulty breathing and what appeared to be mild "volume overload" or fluid in your body. We think this is related to right-sided heart failure as a consequence of high blood pressure in your lungs. This pressure is likely related to your COPD.   You should follow-up with your pulmonologist (lung doctor) and schedule an appointment with a cardiologist (heart doctor) to make sure you are on the appropriate medications.    Diagnosis: Hypoglycemia  Assessment and Plan of Treatment: You had one episode of low sugar during your hospitalization (66). This corrected itself with eating more.    Diagnosis: Macrocytic anemia  Assessment and Plan of Treatment: You were found to have a low blood count. You were also found to have a relatively low iron. You should start taking Ferrous sulfate (iron pills) 325mg by mouth every OTHER day.    Diagnosis: Vitamin D deficiency  Assessment and Plan of Treatment: You have a diagnosis of Vitamin D Deficiency. You should continue taking your Vitamin D supplement as prescribed.    Diagnosis: BRISA (acute kidney injury)  Assessment and Plan of Treatment: You came in with an elevated creatinine, which indicates kidney injury. This resolved with the water pill medication Lasix. You do not need further medications for this at this point, but you should follow-up with your primary care doctor to continue monitoring your labs.    Diagnosis: Bladder cancer  Assessment and Plan of Treatment: You have a diagnosis of bladder cancer and are on chemotherapy and radiation. You should schedule an appointment with Dr. Willingham to ensure appropriate evaluation and management of this problem.    Diagnosis: Advanced care planning/counseling discussion  Assessment and Plan of Treatment: You told use that you did not want to be intubated or have chest compressions performed in the event that your heart stops. We recommend completing a formal Living Will or Advance Directive with your  or Primary Care Doctor to make sure your wishes are carried out on future hospitalizations / doctor's visits.

## 2021-11-17 NOTE — DISCHARGE NOTE PROVIDER - NSDCCPTREATMENT_GEN_ALL_CORE_FT
PRINCIPAL PROCEDURE  Procedure: Transthoracic echo  Findings and Treatment: < from: Transthoracic Echocardiogram (11.16.21 @ 16:52) >  DIMENSIONS:  Dimensions:     Normal Values:  LA:     3.1 cm    2.0 - 4.0 cm  Ao:     3.1 cm    2.0 - 3.8 cm  SEPTUM: 0.7 cm    0.6 -1.2 cm  PWT:    0.7 cm    0.6 - 1.1 cm  LVIDd:  5.2 cm    3.0 - 5.6 cm  LVIDs:  3.5 cm    1.8 - 4.0 cm  Derived Variables:  LVMI: 59 g/m2  RWT: 0.26  Fractional short: 33 %  Ejection Fraction (Modified Zuniga Rule): 61 %  ------------------------------------------------------------------------  OBSERVATIONS:  Mitral Valve: Mitral annular calcification, otherwise  normal mitral valve. Minimal mitral regurgitation.  Aortic Root: Normal aortic root.  Aortic Valve: Aortic valve leaflet morphology not well  visualized.  Left Atrium: Normal left atrium.  Left Ventricle: Endocardium not well visualized; grossly  normal left ventricular systolic function. Normal left  ventricular internal dimensions and wall thicknesses.  Right Heart: Mild right atrial enlargement. Right  ventricular enlargement with decreased right ventricular  systolic function. Normal tricuspid valve.  Mild tricuspid  regurgitation. Normal pulmonic valve.  Pericardium/PleuraNormal pericardium with no pericardial  effusion.  Hemodynamic: Estimated right ventricular systolic pressure  equals 58 mm Hg, assuming right atrial pressure equals 10  mm Hg, consistent with moderate pulmonary hypertension.  ------------------------------------------------------------------------  CONCLUSIONS:  1. Mitral annular calcification, otherwise normal mitral  valve. Minimal mitral regurgitation.  2. Normal left ventricular internal dimensions and wall  thicknesses.  3. Endocardium not well visualized; grossly normal left  ventricular systolic function.  4. Right ventricular enlargement with decreased right  ventricular systolic function.  5. Estimated right ventricular systolic pressure equals 58  mm Hg, assuming right atrial pressure equals 10 mm Hg,  consistent with moderate pulmonary hypertension.  < end of copied text >      SECONDARY PROCEDURE  Procedure: CT chest w con  Findings and Treatment: < from: CT Angio Chest PE Protocol w/ IV Cont (11.15.21 @ 03:42) >  FINDINGS:  LUNGS AND AIRWAYS: Patentcentral airways. Moderate to severe emphysema. Again noted, postsurgical changes of the right lung. Redemonstrated severe emphysema. Mostly stable, nodular opacities and masses, some of which are calcified. For example, left lower lobe calcified mass measures 3.2 x 3.1 cm (2:7). New 1.5 x 1.3 cm right upper lobe nodular opacity (2:31). Minimally increased groundglass opacities since prior study. Compressive atelectasis of the right lung.  PLEURA: Small right pleural effusion.  MEDIASTINUM AND ARNEL: Subcentimeter lymph nodes without lymphadenopathy.  VESSELS: Suboptimal contrast opacification of the peripheral pulmonary arteries. No obvious central pulmonary embolus or secondary signs of right heart strain. Stable main pulmonary artery enlargement. Atherosclerosis. Normal caliber of the thoracic aorta. Bovine arch.  HEART: Normal heart size. Trace pericardial effusion.  CHEST WALL AND LOWER NECK: Anasarca. Bilateral gynecomastia.  VISUALIZED UPPER ABDOMEN: Hiatal hernia again noted. Cholecystectomy. Nonspecific bilateral perinephric stranding. Right renal cyst partially visualized.   BONES: Degenerative changes.  IMPRESSION:  1. Suboptimal evaluation of the peripheral pulmonary arteries. No central pulmonary embolus or secondary signsof right heart strain.  2. Mostly stable, nodular opacities and masses, some of which are calcified. New 1.5 x 1.3 cm right upper lobe nodular opacity.  3. Additional findings as described.  < end of copied text >

## 2021-11-17 NOTE — CONSULT NOTE ADULT - ASSESSMENT
Patient is a 81 yo M with bladder cancer (Dx: 01/2020, recurrence in 06/2021 on chemo C6 D3 Carboplatin, Gemcitabine), COPD, DVT (previously on Eliquis until 03/2021) who was initially admitted on 11/15/21 after presenting with SOB and fever with productive cough x4 days with symptoms starting 1 to 2 days after most recent chemotherapy session. Pulmonary consulted for persistent hypoxemia.   
SHERYL DELONG is a 80y Male who presents with a chief complaint of respiratory failure    Bladder Cancer  - Patient is on carboplatin + gemcitabine. Last chemotherapy administration was on November 12th, 2021.   - No treatment while inpatient.  - On discharge, will follow-up with Dr. Willingham.    Hypoxic Respiratory Failure  - Patient with known severe emphysema with multiple lung nodular opacities and masses; previous biopsy had been benign.  - On cefepime. Continue infectious workup. New right upper lobe nodular opacity could be infectious?  - On DuoNeb for possible emphysema exacerbation.   - Gemcitabine is associated with pulmonary toxicities; rule out more common etiology first. Lung toxicity would be a diagnosis of exclusion given the variety of presentations it could happen.     We will continue to follow. Thank you for the consultation.    Leonel Rucker MD  Hematology/Oncology  C: [phone]  O: [phone]
80M with Bladder Cancer (Dx: 01/2020 initially in remission after chemo & radiation, recurrence in 06/2021 - now C6D4 of Gemcitabine + Carboplatin), HTN (no meds), COPD (not on home O2), DVT (off eliquis since 03/2021 after clearance) who is admitted for evaluation and management of acute hypoxic respiratory failure with exact etiology unknown but potentially cardiogenic, primary pulm, or iatrogenic (ico gemcitabine).

## 2021-11-17 NOTE — PROGRESS NOTE ADULT - PROBLEM SELECTOR PLAN 8
MCV ~100, anemia to 10.4 May be 2/2 chemotherapy. He should be taking B vitamins as OP but does not. B12 & Folate WNL. CTM

## 2021-11-17 NOTE — DISCHARGE NOTE PROVIDER - NSDCCAREPROVSEEN_GEN_ALL_CORE_FT
[facility] Medicine Team 3  Oncology - [place] [facility] Blood (Dr. Willingham's Team)  Pulmonology  Palliative Care

## 2021-11-17 NOTE — CONSULT NOTE ADULT - ATTENDING COMMENTS
80 M with bladder ca on chemo, COPD (not on home O2), DVT hx (not on a/c) here with sob/cough after chemo session, found to have acute hypoxemic respiratory failure, possibly due to copd exacerbation    Pt has extensive emphysema on imaging; was told in past by pulmonologist that he needs to be on oxygen chronically but he refused.  Has had lung biopsy at outside facility in 2019, but doesn't remember where or of what, just that it was non diagnostic.     # acute hypoxemic respiratory failure   # COPD exacerbation  - most likely COPD exacerbation causing hypoxemia  - would treat with standing nebulizers for now  - prednisone 40 mg po daily x 5 days  - says he uses advair at home but per report, wife says he is not compliant    #abnormal ct chest  - has multiple calcified opacities, one new since September   - could be new lung cancer or metastasis from known bladder ca; had non diagnosed biopsy in past  - patient is very adamant that he does NOT want another biopsy of his lung given his bad experience in 2019; he understands this could represent lung cancer but he still is refusing intervention    #shortness of breath   - likely related to copd exacerbation, but given reported fever, would check sputum culture, too

## 2021-11-17 NOTE — DISCHARGE NOTE PROVIDER - NSDCMRMEDTOKEN_GEN_ALL_CORE_FT
Advair Diskus 100 mcg-50 mcg inhalation powder: 1 puff(s) inhaled 2 times a day    **Spouse reports patient is nonadherent to inhaler  Vitamin B-12 1000 mcg oral tablet: 1 tab(s) orally once a day  Vitamin D3 50 mcg (2000 intl units) oral tablet: 1 tab(s) orally once a day  Welchol 625 mg oral tablet: 1 tab(s) orally once a day, As Needed for Diarrhea    Advair Diskus 100 mcg-50 mcg inhalation powder: 1 puff(s) inhaled 2 times a day    **Spouse reports patient is nonadherent to inhaler  ferrous sulfate 325 mg (65 mg elemental iron) oral tablet: 1 tab(s) orally once every other day  ipratropium-albuterol 0.5 mg-2.5 mg/3 mL inhalation solution: 3 milliliter(s) inhaled every 6 hours, As Needed - for shortness of breath and/or wheezing   predniSONE 20 mg oral tablet: 2 tab(s) orally once a day  Spiriva HandiHaler 18 mcg inhalation capsule: 1 cap(s) inhaled once a day   Vitamin B-12 1000 mcg oral tablet: 1 tab(s) orally once a day  Vitamin D3 50 mcg (2000 intl units) oral tablet: 1 tab(s) orally once a day  Welchol 625 mg oral tablet: 1 tab(s) orally once a day, As Needed for Diarrhea

## 2021-11-17 NOTE — PROGRESS NOTE ADULT - PROBLEM SELECTOR PLAN 4
Thank you for allowing us to participate in your patient's care.  Patient to be discharged from [facility] team consult service today.   Discussed with primary team.  Please re-consult if we can be of further assistance, page [phone].

## 2021-11-17 NOTE — PROGRESS NOTE ADULT - PROBLEM SELECTOR PLAN 9
By report. Will assess VIT d level, adjust regimen as necessary. Vit D at appropriate range and non-toxic. Can continue this with OP follow-up.

## 2021-11-17 NOTE — PROGRESS NOTE ADULT - PROBLEM SELECTOR PLAN 1
Presented with relatively acute SOB (x4d), found to have O2 sat in mid-80s with known emphysema/COPD, LE swelling. BNP elevated (even when accounting for age), trop elevated (delta > 6). Now with TTE showing moderate pHTN raising c/f RHF  DDx broad but includes:  - Cardiovascular: c  - Pulm: COPD Exacerbation unlikely d/t no wheeze,   - Heme: VTE still unlikely given DVT studies negative (CTPA, dopplers)  - ID: CXR with stable opacities but new RUL lesion - with this & sterile pyuria, should consider TB though very low likelihood given no risk factors (no incarceration, no immigration from endemic region).   Diagnostics  - RVP negative, PCT 0.12 (mildly elevated but not likely systemic sepsis)  [ ] F/U BCx (NGTD), UCx (NGTD), MRSA swab (-)  [ ] Quantiferon TB  [ ] TTE    Therapeutics  - De-escalate ABx Cefepime --> CTX Presented with relatively acute SOB (x4d), found to have O2 sat in mid-80s with known emphysema/COPD, LE swelling. BNP elevated (even when accounting for age), trop elevated (delta > 6). Now with TTE showing moderate pHTN raising c/f RHF in this context (i.e. Group 3 pHTN).  Diagnostics  - RVP negative, PCT 0.12 (mildly elevated but not likely systemic sepsis)  - Pulm C/S, Appreciate Recs  [ ] F/U BCx (NGTD), UCx (NGTD), MRSA swab (-)  [ ] Quantiferon TB    Therapeutics  - D/C ABx given not covering for COPD exacerbation, no obvious signs of PNA

## 2021-11-17 NOTE — DISCHARGE NOTE PROVIDER - NSDCFUADDINST_GEN_ALL_CORE_FT
You should take your medications as prescribed, particularly your Advair inhaler. You should use this twice per day to prevent further decline in your lung function.     You were given a prescription for Prednisone, which is a steroid that can help with exacerbations of COPD. You should take this once per day to complete the prescription for a total of five days (including what we gave you in the hospital).

## 2021-11-17 NOTE — CONSULT NOTE ADULT - SUBJECTIVE AND OBJECTIVE BOX
CHIEF COMPLAINT:    HPI: Patient is a 81 yo M with bladder cancer (Dx: 01/2020, recurrence in 06/2021 on chemo C6 D3 Carboplatin, Gemcitabine), COPD, DVT (previously on Eliquis until 03/2021) who was initially admitted on 11/15/21 after presenting with SOB and fever with productive cough x4 days with symptoms starting 1 to 2 days after most recent cehemotherapy session. As per patient, he has not had symptoms like this after previous chemotherapy sessions.     Does endorse that his symptoms appear to be improving since admission, however remains on 6L nc prompting pulmonary evaluation. Patient has significant smoking history and is still currently smoking. Is on advair at home with unclear compliance and may or may not have been prescribed home O2 in the past but patient does not use and "it does nothing for him". Denies any sick contacts.      CT chest notable for severe emphysema and mostly stable, nodular opacities and masses, some of which are calcified as well as new 1.5 x 1.3 cm right upper lobe nodular opacity (2:31). As per patient and chart review, patient had prior biopsy attempt which was non diagnostic and patient has and currently refuses repeat biopsy.     TTE notable for RV enlargement with decreased RVSF with estimated RVSP 58 mm Hg.     PAST MEDICAL & SURGICAL HISTORY:  Hypertension  on no med, monitored by MD    Emphysema of lung  does not use inhalers as rx&#x27;d    Nodule of left lung  monitored by MD , had CT and biopsy 7/2019  inconclusive,  will have next CT 1/2020    Vitamin D deficiency    S/P laparoscopic cholecystectomy    S/P left inguinal hernia repair  1979    S/P cataract surgery  right  eye  9/2019        FAMILY HISTORY:  Family hx of hypertension    FH: diabetes mellitus        SOCIAL HISTORY:  Smoking: [ ] Never Smoked [ ] Former Smoker (__ packs x ___ years) [X] Current Smoker  (__ packs x ___ years)  Substance Use: [ ] Never Used [ ] Used ____  EtOH Use:  Marital Status: [ ] Single [ ]  [ ]  [ ]   Sexual History:   Occupation:  Recent Travel:  Country of Birth:  Advance Directives:    Allergies    allergic to cefobid, unknown reaction (Other)  penicillin (Rash)    Intolerances        HOME MEDICATIONS:  Home Medications:  Advair Diskus 100 mcg-50 mcg inhalation powder: 1 puff(s) inhaled 2 times a day    **Spouse reports patient is nonadherent to inhaler (15 Nov 2021 14:36)  Vitamin B-12 1000 mcg oral tablet: 1 tab(s) orally once a day (15 Nov 2021 14:36)  Vitamin D3 50 mcg (2000 intl units) oral tablet: 1 tab(s) orally once a day (15 Nov 2021 14:36)  Welchol 625 mg oral tablet: 1 tab(s) orally once a day, As Needed for Diarrhea  (15 Nov 2021 14:36)      REVIEW OF SYSTEMS:  Constitutional: [ ] negative [ ] fevers [ ] chills [ ] weight loss [ ] weight gain  HEENT: [ ] negative [ ] dry eyes [ ] eye irritation [ ] postnasal drip [ ] nasal congestion  CV: [ ] negative  [ ] chest pain [ ] orthopnea [ ] palpitations [ ] murmur  Resp: [ ] negative [X] cough [X] shortness of breath [ ] dyspnea [ ] wheezing [ ] sputum [ ] hemoptysis  GI: [ ] negative [ ] nausea [ ] vomiting [ ] diarrhea [ ] constipation [ ] abd pain [ ] dysphagia   : [ ] negative [ ] dysuria [ ] nocturia [ ] hematuria [ ] increased urinary frequency  Musculoskeletal: [ ] negative [ ] back pain [ ] myalgias [ ] arthralgias [ ] fracture  Skin: [ ] negative [ ] rash [ ] itch  Neurological: [ ] negative [ ] headache [ ] dizziness [ ] syncope [ ] weakness [ ] numbness  Psychiatric: [ ] negative [ ] anxiety [ ] depression  Endocrine: [ ] negative [ ] diabetes [ ] thyroid problem  Hematologic/Lymphatic: [ ] negative [ ] anemia [ ] bleeding problem  Allergic/Immunologic: [ ] negative [ ] itchy eyes [ ] nasal discharge [ ] hives [ ] angioedema  [X] All other systems negative  [ ] Unable to assess ROS because ________    OBJECTIVE:  ICU Vital Signs Last 24 Hrs  T(C): 37 (17 Nov 2021 06:26), Max: 37 (17 Nov 2021 02:36)  T(F): 98.6 (17 Nov 2021 06:26), Max: 98.6 (17 Nov 2021 02:36)  HR: 94 (17 Nov 2021 09:36) (91 - 94)  BP: 108/64 (17 Nov 2021 09:36) (104/67 - 124/60)  BP(mean): --  ABP: --  ABP(mean): --  RR: 19 (17 Nov 2021 09:36) (16 - 19)  SpO2: 92% (17 Nov 2021 09:36) (92% - 97%)        11-16 @ 07:01  -  11-17 @ 07:00  --------------------------------------------------------  IN: 0 mL / OUT: 1500 mL / NET: -1500 mL      CAPILLARY BLOOD GLUCOSE      POCT Blood Glucose.: 70 mg/dL (17 Nov 2021 11:20)      PHYSICAL EXAM:  General: NAD, resting comfortably  HEENT: EOMI, PERRLA  Respiratory: Diffuse crack;es  Cardiovascular: S1S2, RRR  Abdomen: Soft, NTND, BS+  Extremities: No peripheral edema    LINES:     HOSPITAL MEDICATIONS:  Standing Meds:  budesonide 160 MICROgram(s)/formoterol 4.5 MICROgram(s) Inhaler 2 Puff(s) Inhalation two times a day  cyanocobalamin 1000 MICROGram(s) Oral daily  enoxaparin Injectable 40 milliGRAM(s) SubCutaneous at bedtime  furosemide   Injectable 20 milliGRAM(s) IV Push once  potassium phosphate / sodium phosphate Powder (PHOS-NaK) 1 Packet(s) Oral three times a day      PRN Meds:  albuterol/ipratropium for Nebulization 3 milliLiter(s) Nebulizer every 6 hours PRN  nicotine -  14 mG/24Hr(s) Patch 1 patch Transdermal daily PRN      LABS:                        10.4   5.40  )-----------( 397      ( 17 Nov 2021 06:26 )             34.7     Hgb Trend: 10.4<--, 10.8<--, 10.4<--  11-17    143  |  100  |  12  ----------------------------<  76  4.0   |  36<H>  |  0.99    Ca    8.7      17 Nov 2021 06:26  Phos  2.7     11-17  Mg     1.80     11-17    TPro  6.4  /  Alb  3.0<L>  /  TBili  0.6  /  DBili  x   /  AST  16  /  ALT  14  /  AlkPhos  60  11-17    Creatinine Trend: 0.99<--, 0.99<--, 1.34<--        Venous Blood Gas:  11-17 @ 06:26  7.39/65/58/39/88.5  VBG Lactate: 2.6      MICROBIOLOGY:     Culture - Blood (collected 15 Nov 2021 06:23)  Source: .Blood Blood-Peripheral  Preliminary Report (16 Nov 2021 07:01):    No growth to date.    Culture - Blood (collected 15 Nov 2021 06:23)  Source: .Blood Blood-Peripheral  Preliminary Report (16 Nov 2021 07:01):    No growth to date.    Culture - Urine (collected 15 Nov 2021 02:24)  Source: Clean Catch Clean Catch (Midstream)  Final Report (16 Nov 2021 07:22):    <10,000 CFU/mL Normal Urogenital Ana María        RADIOLOGY:  [ ] Reviewed and interpreted by me    PULMONARY FUNCTION TESTS:    EKG:   CHIEF COMPLAINT: sob    HPI: Patient is a 81 yo M with bladder cancer (Dx: 01/2020, recurrence in 06/2021 on chemo C6 D3 Carboplatin, Gemcitabine), COPD, DVT (previously on Eliquis until 03/2021) who was initially admitted on 11/15/21 after presenting with SOB and fever with productive cough x4 days with symptoms starting 1 to 2 days after most recent cehemotherapy session. As per patient, he has not had symptoms like this after previous chemotherapy sessions.     Does endorse that his symptoms appear to be improving since admission, however remains on 6L nc prompting pulmonary evaluation. Patient has significant smoking history and is still currently smoking. Is on advair at home with unclear compliance and may or may not have been prescribed home O2 in the past but patient does not use and "it does nothing for him". Denies any sick contacts.      CT chest notable for severe emphysema and mostly stable, nodular opacities and masses, some of which are calcified as well as new 1.5 x 1.3 cm right upper lobe nodular opacity (2:31). As per patient and chart review, patient had prior biopsy attempt which was non diagnostic and patient has and currently refuses repeat biopsy.     TTE notable for RV enlargement with decreased RVSF with estimated RVSP 58 mm Hg.     PAST MEDICAL & SURGICAL HISTORY:  Hypertension  on no med, monitored by MD    Emphysema of lung  does not use inhalers as rx&#x27;d    Nodule of left lung  monitored by MD , had CT and biopsy 7/2019  inconclusive,  will have next CT 1/2020    Vitamin D deficiency    S/P laparoscopic cholecystectomy    S/P left inguinal hernia repair  1979    S/P cataract surgery  right  eye  9/2019        FAMILY HISTORY:  Family hx of hypertension    FH: diabetes mellitus        SOCIAL HISTORY:  Smoking: [ ] Never Smoked [ ] Former Smoker (__ packs x ___ years) [X] Current Smoker  (__ packs x ___ years)  Substance Use: [ ] Never Used [ ] Used ____  EtOH Use: no active etoh use  Marital Status: [ ] Single [ ]  [ ]  [ ]   Sexual History:   Occupation:  Recent Travel:  Country of Birth:  Advance Directives:    Allergies    allergic to cefobid, unknown reaction (Other)  penicillin (Rash)    Intolerances        HOME MEDICATIONS:  Home Medications:  Advair Diskus 100 mcg-50 mcg inhalation powder: 1 puff(s) inhaled 2 times a day    **Spouse reports patient is nonadherent to inhaler (15 Nov 2021 14:36)  Vitamin B-12 1000 mcg oral tablet: 1 tab(s) orally once a day (15 Nov 2021 14:36)  Vitamin D3 50 mcg (2000 intl units) oral tablet: 1 tab(s) orally once a day (15 Nov 2021 14:36)  Welchol 625 mg oral tablet: 1 tab(s) orally once a day, As Needed for Diarrhea  (15 Nov 2021 14:36)      REVIEW OF SYSTEMS:  Constitutional: [ ] negative [x ] no fevers [ x] no chills [ ] weight loss [ ] weight gain  HEENT: [ ] negative [ ] dry eyes [ ] eye irritation [ ] postnasal drip [ ] nasal congestion  CV: [ ] negative  [x ] no chest pain [x ]no orthopnea [ ] palpitations [ ] murmur  Resp: [ ] negative [X] cough [X] shortness of breath [ ] dyspnea [ ] wheezing [ ] sputum [ ] hemoptysis  GI: [ ] negative [x ]no nausea [ x] no vomiting [ ] diarrhea [ ] constipation [ ] abd pain [ ] dysphagia   : [ ] negative [ ] dysuria [ ] nocturia [ ] hematuria [ ] increased urinary frequency  Musculoskeletal: [ ] negative [ ] back pain [ ] myalgias [ ] arthralgias [ ] fracture  Skin: [ ] negative [ ] rash [ ] itch  Neurological: [ ] negative [ ] headache [ ] dizziness [ ] syncope [ ] weakness [ ] numbness  Psychiatric: [ ] negative [ ] anxiety [ ] depression  Endocrine: [ ] negative [ ] diabetes [ ] thyroid problem  Hematologic/Lymphatic: [ ] negative [ ] anemia [ ] bleeding problem  Allergic/Immunologic: [ ] negative [ ] itchy eyes [ ] nasal discharge [ ] hives [ ] angioedema  [X] All other systems negative  [ ] Unable to assess ROS because ________    OBJECTIVE:  ICU Vital Signs Last 24 Hrs  T(C): 37 (17 Nov 2021 06:26), Max: 37 (17 Nov 2021 02:36)  T(F): 98.6 (17 Nov 2021 06:26), Max: 98.6 (17 Nov 2021 02:36)  HR: 94 (17 Nov 2021 09:36) (91 - 94)  BP: 108/64 (17 Nov 2021 09:36) (104/67 - 124/60)  BP(mean): --  ABP: --  ABP(mean): --  RR: 19 (17 Nov 2021 09:36) (16 - 19)  SpO2: 92% (17 Nov 2021 09:36) (92% - 97%)        11-16 @ 07:01  -  11-17 @ 07:00  --------------------------------------------------------  IN: 0 mL / OUT: 1500 mL / NET: -1500 mL      CAPILLARY BLOOD GLUCOSE      POCT Blood Glucose.: 70 mg/dL (17 Nov 2021 11:20)      PHYSICAL EXAM:  General: NAD, resting comfortably  HEENT: EOMI, PERRLA  Respiratory: Diffuse crackles bilaterally, no wheezing, rales  Cardiovascular: S1S2, RRR  Abdomen: Soft, NTND, BS+  Extremities: No peripheral edema  neuro: non focal exam, symmetric strength  psych: not anxious, alert and oriented x 3    LINES:     HOSPITAL MEDICATIONS:  Standing Meds:  budesonide 160 MICROgram(s)/formoterol 4.5 MICROgram(s) Inhaler 2 Puff(s) Inhalation two times a day  cyanocobalamin 1000 MICROGram(s) Oral daily  enoxaparin Injectable 40 milliGRAM(s) SubCutaneous at bedtime  furosemide   Injectable 20 milliGRAM(s) IV Push once  potassium phosphate / sodium phosphate Powder (PHOS-NaK) 1 Packet(s) Oral three times a day      PRN Meds:  albuterol/ipratropium for Nebulization 3 milliLiter(s) Nebulizer every 6 hours PRN  nicotine -  14 mG/24Hr(s) Patch 1 patch Transdermal daily PRN      LABS:                        10.4   5.40  )-----------( 397      ( 17 Nov 2021 06:26 )             34.7     Hgb Trend: 10.4<--, 10.8<--, 10.4<--  11-17    143  |  100  |  12  ----------------------------<  76  4.0   |  36<H>  |  0.99    Ca    8.7      17 Nov 2021 06:26  Phos  2.7     11-17  Mg     1.80     11-17    TPro  6.4  /  Alb  3.0<L>  /  TBili  0.6  /  DBili  x   /  AST  16  /  ALT  14  /  AlkPhos  60  11-17    Creatinine Trend: 0.99<--, 0.99<--, 1.34<--        Venous Blood Gas:  11-17 @ 06:26  7.39/65/58/39/88.5  VBG Lactate: 2.6      MICROBIOLOGY:     Culture - Blood (collected 15 Nov 2021 06:23)  Source: .Blood Blood-Peripheral  Preliminary Report (16 Nov 2021 07:01):    No growth to date.    Culture - Blood (collected 15 Nov 2021 06:23)  Source: .Blood Blood-Peripheral  Preliminary Report (16 Nov 2021 07:01):    No growth to date.    Culture - Urine (collected 15 Nov 2021 02:24)  Source: Clean Catch Clean Catch (Midstream)  Final Report (16 Nov 2021 07:22):    <10,000 CFU/mL Normal Urogenital Ana María        RADIOLOGY:  [x ] Reviewed and interpreted by me  ct chest: extensive emphysema, new opacities, some calcified  PULMONARY FUNCTION TESTS:    EKG:

## 2021-11-17 NOTE — PHYSICAL THERAPY INITIAL EVALUATION ADULT - GENERAL OBSERVATIONS, REHAB EVAL
Patient received semi supine in bed , (+) tele monitor, (+) 6LO2 NC , pt with no c/o SOB however SpO2 at rest 94% and with activities desaturated to 80%- RN Nikky made aware, pt in no apparent distress ,left with HOB at 45 degrees and bed alarm on. Nsg student is at bedside.

## 2021-11-17 NOTE — DISCHARGE NOTE PROVIDER - NSFOLLOWUPCLINICS_GEN_ALL_ED_FT
[facility]  Cardiology  [address] [place] [id number]  Phone: [phone]  Fax:   Follow Up Time: 1 week

## 2021-11-17 NOTE — PROGRESS NOTE ADULT - PROBLEM SELECTOR PLAN 5
BNP elevated to 2.7k, which is actually elevated despite adjustment for age. May indicate cardiac stretching ico HF vs strain. Received Furosemide ico severe respiratory distress, doing well today.

## 2021-11-17 NOTE — PHYSICAL THERAPY INITIAL EVALUATION ADULT - PERTINENT HX OF CURRENT PROBLEM, REHAB EVAL
This is a 80y F with bladder Ca,admitted for evaluation and management of acute hypoxic respiratory failure with exact etiology unknown but potentially cardiogenic, primary pulm, or iatrogenic. This is a 80y F with bladder Ca admitted for evaluation and management of acute hypoxic respiratory failure with exact etiology unknown but potentially cardiogenic, primary pulm, or iatrogenic.

## 2021-11-17 NOTE — PROGRESS NOTE ADULT - PROBLEM SELECTOR PLAN 6
Creatinine elevated to 1.3 from b/l ~0.8, improved to .~9 after lasix challenge yesterday. Likely some component of poor ECV in this context.  Will CTM, further diuresis as above, awaiting echo.  - lasix challenge as above, CTM  - avoid nephrotoxics

## 2021-11-17 NOTE — PROGRESS NOTE ADULT - ASSESSMENT
80M with Bladder Cancer (Dx: 01/2020 initially in remission after chemo & radiation, recurrence in 06/2021 - now C6D4 of Gemcitabine + Carboplatin), HTN (no meds), COPD (not on home O2), DVT (off eliquis since 03/2021 after clearance) who is admitted for evaluation and management of acute hypoxic respiratory failure with exact etiology unknown but potentially cardiogenic, primary pulm, or iatrogenic (ico gemcitabine).

## 2021-11-17 NOTE — DISCHARGE NOTE PROVIDER - CARE PROVIDER_API CALL
Ramírez Aleman  INTERNAL MEDICINE  [address] [facility]  [place] [address]  Phone: [phone]  Fax: [phone]  Established Patient  Follow Up Time: 1-3 days    Fabricio Willingham)  Internal Medicine  [address]  [place] [address]  Phone: [phone]  Fax: [phone]  Established Patient  Follow Up Time: 1-3 days

## 2021-11-17 NOTE — PROGRESS NOTE ADULT - PROBLEM SELECTOR PLAN 10
Hospital Bundle  Fluids: PO ad eugenie  Electrolytes: Replete K > 4, Mg > 2, Phos > 3  Nutrition: Diet DASH  PPX  ---VTE: SQH  ---GI: N/A  ---Resp: IS  Access: PIV  Code Status: DNR/DNI (MOLST in Chart)  Dispo: Pending clinical stabilization, PT eval Hospital Bundle  Fluids: PO ad eugenie  Electrolytes: Replete K > 4, Mg > 2, Phos > 3  Nutrition: Diet DASH  PPX  ---VTE: LVX  ---GI: N/A  ---Resp: IS  Access: PIV  Code Status: DNR/DNI (MOLST in Chart)  Dispo: Pending clinical stabilization, eventuall Home

## 2021-11-17 NOTE — CONSULT NOTE ADULT - PROBLEM SELECTOR RECOMMENDATION 9
CT chest notable for severe emphysema and mostly stable, nodular opacities and masses, some of which are calcified as well as new 1.5 x 1.3 cm right upper lobe nodular opacity (2:31). As per patient and chart review, patient had prior biopsy attempt which was non diagnostic and patient has and currently refuses repeat biopsy. TTE notable for RV enlargement with decreased RVSF with estimated RVSP 58 mm Hg. Most likely COPD exacerbation vs chemotherapy related pulmonary injury with component of Group 3 pHTN. Unclear how much of respiratory failure is chronic in nature as patient may have been prescribed home O2 in past  - Would treat with Prednisone 40mg x5 days  - Duonebs q6h for now  - Wean O2 as tolerated  - Chest PT with Aerobika q6h with duonebs to faciliate secretion mobilization  - Please obtain collateral from outpatient pulm Dr. Ramírez Aleman  - Patient currently declining workup of pulmonary masses (lung malignancy vs bladder mets?)    Ap Leonard MD  Pulmonary & Critical Care Fellow  [phone]
due to hypoxic respiratory failure   management as per primary team

## 2021-11-17 NOTE — PROGRESS NOTE ADULT - SUBJECTIVE AND OBJECTIVE BOX
PROGRESS NOTE:     Patient is a 80y old  Male who presents with a chief complaint of Acute Hypoxic Respiratory Failure (16 Nov 2021 07:07)      SUBJECTIVE / OVERNIGHT EVENTS:        MEDICATIONS  (STANDING):  budesonide 160 MICROgram(s)/formoterol 4.5 MICROgram(s) Inhaler 2 Puff(s) Inhalation two times a day  cefTRIAXone   IVPB 1000 milliGRAM(s) IV Intermittent every 24 hours  cyanocobalamin 1000 MICROGram(s) Oral daily  heparin   Injectable 5000 Unit(s) SubCutaneous every 8 hours    MEDICATIONS  (PRN):  albuterol/ipratropium for Nebulization 3 milliLiter(s) Nebulizer every 6 hours PRN Shortness of Breath and/or Wheezing  nicotine -  14 mG/24Hr(s) Patch 1 patch Transdermal daily PRN For Tobacco Cravings only      CAPILLARY BLOOD GLUCOSE      POCT Blood Glucose.: 85 mg/dL (17 Nov 2021 03:02)  POCT Blood Glucose.: 147 mg/dL (16 Nov 2021 21:34)  POCT Blood Glucose.: 97 mg/dL (16 Nov 2021 16:35)    I&O's Summary    15 Nov 2021 07:01  -  16 Nov 2021 07:00  --------------------------------------------------------  IN: 0 mL / OUT: 1800 mL / NET: -1800 mL    16 Nov 2021 07:01  -  17 Nov 2021 06:44  --------------------------------------------------------  IN: 0 mL / OUT: 1500 mL / NET: -1500 mL        PHYSICAL EXAM:  Vital Signs Last 24 Hrs  T(C): 37 (17 Nov 2021 06:26), Max: 37 (17 Nov 2021 02:36)  T(F): 98.6 (17 Nov 2021 06:26), Max: 98.6 (17 Nov 2021 02:36)  HR: 91 (17 Nov 2021 06:26) (91 - 94)  BP: 112/64 (17 Nov 2021 06:26) (111/63 - 124/60)  BP(mean): --  RR: 16 (17 Nov 2021 06:26) (16 - 18)  SpO2: 97% (17 Nov 2021 06:26) (93% - 97%)    CONSTITUTIONAL: NAD, well-developed  RESPIRATORY: Normal respiratory effort; lungs are clear to auscultation bilaterally  CARDIOVASCULAR: Regular rate and rhythm, normal S1 and S2, no murmur/rub/gallop; No lower extremity edema; Peripheral pulses are 2+ bilaterally  ABDOMEN: Nontender to palpation, normoactive bowel sounds, no rebound/guarding; No hepatosplenomegaly  MUSCLOSKELETAL: no clubbing or cyanosis of digits; no joint swelling or tenderness to palpation  NEURO: CN 2-12 grossly intact, moves all limbs spontaneously  PSYCH: A+O to person, place, and time; affect appropriate    LABS:                        10.4   5.40  )-----------( 397      ( 17 Nov 2021 06:26 )             34.7     11-16    142  |  101  |  15  ----------------------------<  66<L>  4.0   |  32<H>  |  0.99    Ca    8.9      16 Nov 2021 08:32  Phos  2.3     11-16  Mg     1.80     11-16      Culture - Blood (collected 15 Nov 2021 06:23)  Source: .Blood Blood-Peripheral  Preliminary Report (16 Nov 2021 07:01):    No growth to date.    Culture - Blood (collected 15 Nov 2021 06:23)  Source: .Blood Blood-Peripheral  Preliminary Report (16 Nov 2021 07:01):    No growth to date.    Culture - Urine (collected 15 Nov 2021 02:24)  Source: Clean Catch Clean Catch (Midstream)  Final Report (16 Nov 2021 07:22):    <10,000 CFU/mL Normal Urogenital Ana María    RADIOLOGY & ADDITIONAL TESTS:  Imaging Personally Reviewed:   < from: Transthoracic Echocardiogram (11.16.21 @ 16:52) >  CONCLUSIONS:  1. Mitral annular calcification, otherwise normal mitral  valve. Minimal mitral regurgitation.  2. Normal left ventricular internal dimensions and wall  thicknesses.  3. Endocardium not well visualized; grossly normal left  ventricular systolic function.  4. Right ventricular enlargement with decreased right  ventricular systolic function.  5. Estimated right ventricular systolic pressure equals 58  mm Hg, assuming right atrial pressure equals 10 mm Hg,  consistent with moderate pulmonary hypertension.    < end of copied text >    Electrocardiogram Personally Reviewed:    COORDINATION OF CARE:  Care Discussed with Consultants/Other Providers [Y/N]: Y  Prior or Outpatient Records Reviewed [Y/N]: Y   Chavo Warner M.D.  [facility] PGY-1    PROGRESS NOTE:     Patient is a 80y old  Male who presents with a chief complaint of Acute Hypoxic Respiratory Failure (17 Nov 2021 12:39)      -OVERNIGHT EVENTS-  GIORGI, continues to intermittently desaturate to 83%-88% (not documented elsewhere but reported by nursing staff)    -SUBJECTIVE-  Mr. Gomez says that he feels much better than when he came in and is curious to know when he'll be able to go home. His appetite is returning and he continues to have good urine output. He denies any CP and his SOB has improved.    MEDICATIONS  (STANDING):  budesonide 160 MICROgram(s)/formoterol 4.5 MICROgram(s) Inhaler 2 Puff(s) Inhalation two times a day  cyanocobalamin 1000 MICROGram(s) Oral daily  enoxaparin Injectable 40 milliGRAM(s) SubCutaneous at bedtime  furosemide   Injectable 20 milliGRAM(s) IV Push once  potassium phosphate / sodium phosphate Powder (PHOS-NaK) 1 Packet(s) Oral three times a day  predniSONE   Tablet 40 milliGRAM(s) Oral daily    MEDICATIONS  (PRN):  nicotine -  14 mG/24Hr(s) Patch 1 patch Transdermal daily PRN For Tobacco Cravings only      -OBJECTIVE-    CAPILLARY BLOOD GLUCOSE      POCT Blood Glucose.: 70 mg/dL (17 Nov 2021 11:20)  POCT Blood Glucose.: 97 mg/dL (17 Nov 2021 07:52)  POCT Blood Glucose.: 85 mg/dL (17 Nov 2021 03:02)  POCT Blood Glucose.: 147 mg/dL (16 Nov 2021 21:34)  POCT Blood Glucose.: 97 mg/dL (16 Nov 2021 16:35)    I&O's Summary    16 Nov 2021 07:01  -  17 Nov 2021 07:00  --------------------------------------------------------  IN: 0 mL / OUT: 1500 mL / NET: -1500 mL        VITAL SIGNS  Vital Signs Last 24 Hrs  T(C): 37 (17 Nov 2021 06:26), Max: 37 (17 Nov 2021 02:36)  T(F): 98.6 (17 Nov 2021 06:26), Max: 98.6 (17 Nov 2021 02:36)  HR: 94 (17 Nov 2021 09:36) (91 - 94)  BP: 108/64 (17 Nov 2021 09:36) (104/67 - 124/60)  BP(mean): --  RR: 19 (17 Nov 2021 09:36) (16 - 19)  SpO2: 92% (17 Nov 2021 09:36) (92% - 97%)    PHYSICAL EXAM:  GENERAL/CONSTITUTIONAL: NAD, Awake, AOx3 (person, place, time)  HEENT: NCAT  ---EYES: no scleral icterus, EOMI, PERRLA  ---ENMT: MMM  CARDIO: RRR.RESP: Normal respiratory effort; CTAB, no w/r/r  ABD: Soft, NTND; +BS.   MSK: No obvious deformity or ROM deficit.  ---EXTREMITIES: No peripheral edema. Peripheral pulses 2+ x4.  SKIN: Warm, dry. No rashes.   NEURO: Moves all four extremities spontaneously  PSYCH: Appropriate mood & affect.     LABS:                        10.4   5.40  )-----------( 397      ( 17 Nov 2021 06:26 )             34.7     11-17    143  |  100  |  12  ----------------------------<  76  4.0   |  36<H>  |  0.99    Ca    8.7      17 Nov 2021 06:26  Phos  2.7     11-17  Mg     1.80     11-17    TPro  6.4  /  Alb  3.0<L>  /  TBili  0.6  /  DBili  x   /  AST  16  /  ALT  14  /  AlkPhos  60  11-17    MICROBIOLOGY    Culture - Blood (collected 15 Nov 2021 06:23)  Source: .Blood Blood-Peripheral  Preliminary Report (16 Nov 2021 07:01):    No growth to date.    Culture - Blood (collected 15 Nov 2021 06:23)  Source: .Blood Blood-Peripheral  Preliminary Report (16 Nov 2021 07:01):    No growth to date.    Culture - Urine (collected 15 Nov 2021 02:24)  Source: Clean Catch Clean Catch (Midstream)  Final Report (16 Nov 2021 07:22):    <10,000 CFU/mL Normal Urogenital Ana María        IMAGING  < from: Transthoracic Echocardiogram (11.16.21 @ 16:52) >  CONCLUSIONS:  1. Mitral annular calcification, otherwise normal mitral  valve. Minimal mitral regurgitation.  2. Normal left ventricular internal dimensions and wall  thicknesses.  3. Endocardium not well visualized; grossly normal left  ventricular systolic function.  4. Right ventricular enlargement with decreased right  ventricular systolic function.  5. Estimated right ventricular systolic pressure equals 58  mm Hg, assuming right atrial pressure equals 10 mm Hg,  consistent with moderate pulmonary hypertension.    < end of copied text >    COORDINATION OF CARE:  ---PCP: Dr. Francisco  ---Consultants: Pulm (IP & OP Dr. Aleman)

## 2021-11-17 NOTE — PROGRESS NOTE ADULT - PROBLEM SELECTOR PLAN 2
- Pulmonology recommendations appreciated  - switched from HFNC to NC now  - management as per primary team

## 2021-11-18 ENCOUNTER — TRANSCRIPTION ENCOUNTER (OUTPATIENT)
Age: 81
End: 2021-11-18

## 2021-11-18 VITALS
TEMPERATURE: 98 F | SYSTOLIC BLOOD PRESSURE: 109 MMHG | HEART RATE: 85 BPM | DIASTOLIC BLOOD PRESSURE: 62 MMHG | RESPIRATION RATE: 17 BRPM | OXYGEN SATURATION: 94 %

## 2021-11-18 LAB
ALBUMIN SERPL ELPH-MCNC: 2.8 G/DL — LOW (ref 3.3–5)
ALP SERPL-CCNC: 53 U/L — SIGNIFICANT CHANGE UP (ref 40–120)
ALT FLD-CCNC: 12 U/L — SIGNIFICANT CHANGE UP (ref 4–41)
ANION GAP SERPL CALC-SCNC: 9 MMOL/L — SIGNIFICANT CHANGE UP (ref 7–14)
AST SERPL-CCNC: 17 U/L — SIGNIFICANT CHANGE UP (ref 4–40)
BASE EXCESS BLDV CALC-SCNC: 10.7 MMOL/L — HIGH (ref -2–3)
BASOPHILS # BLD AUTO: 0.03 K/UL — SIGNIFICANT CHANGE UP (ref 0–0.2)
BASOPHILS NFR BLD AUTO: 1 % — SIGNIFICANT CHANGE UP (ref 0–2)
BILIRUB SERPL-MCNC: 0.7 MG/DL — SIGNIFICANT CHANGE UP (ref 0.2–1.2)
BLOOD GAS VENOUS COMPREHENSIVE RESULT: SIGNIFICANT CHANGE UP
BUN SERPL-MCNC: 10 MG/DL — SIGNIFICANT CHANGE UP (ref 7–23)
CALCIUM SERPL-MCNC: 8.6 MG/DL — SIGNIFICANT CHANGE UP (ref 8.4–10.5)
CHLORIDE BLDV-SCNC: 99 MMOL/L — SIGNIFICANT CHANGE UP (ref 96–108)
CHLORIDE SERPL-SCNC: 95 MMOL/L — LOW (ref 98–107)
CO2 BLDV-SCNC: 39 MMOL/L — HIGH (ref 22–26)
CO2 SERPL-SCNC: 32 MMOL/L — HIGH (ref 22–31)
CREAT SERPL-MCNC: 0.93 MG/DL — SIGNIFICANT CHANGE UP (ref 0.5–1.3)
EOSINOPHIL # BLD AUTO: 0.06 K/UL — SIGNIFICANT CHANGE UP (ref 0–0.5)
EOSINOPHIL NFR BLD AUTO: 2.1 % — SIGNIFICANT CHANGE UP (ref 0–6)
FERRITIN SERPL-MCNC: 305 NG/ML — SIGNIFICANT CHANGE UP (ref 30–400)
GAS PNL BLDV: 135 MMOL/L — LOW (ref 136–145)
GLUCOSE BLDC GLUCOMTR-MCNC: 123 MG/DL — HIGH (ref 70–99)
GLUCOSE BLDC GLUCOMTR-MCNC: 80 MG/DL — SIGNIFICANT CHANGE UP (ref 70–99)
GLUCOSE BLDC GLUCOMTR-MCNC: 96 MG/DL — SIGNIFICANT CHANGE UP (ref 70–99)
GLUCOSE BLDV-MCNC: 61 MG/DL — LOW (ref 70–99)
GLUCOSE SERPL-MCNC: 57 MG/DL — LOW (ref 70–99)
HCO3 BLDV-SCNC: 37 MMOL/L — HIGH (ref 22–29)
HCT VFR BLD CALC: 34.8 % — LOW (ref 39–50)
HCT VFR BLDA CALC: 29 % — LOW (ref 39–51)
HGB BLD CALC-MCNC: 9.6 G/DL — LOW (ref 13–17)
HGB BLD-MCNC: 10.6 G/DL — LOW (ref 13–17)
IANC: 1.59 K/UL — SIGNIFICANT CHANGE UP (ref 1.5–8.5)
IMM GRANULOCYTES NFR BLD AUTO: 0.7 % — SIGNIFICANT CHANGE UP (ref 0–1.5)
IRON SATN MFR SERPL: 14 % — SIGNIFICANT CHANGE UP (ref 14–50)
IRON SATN MFR SERPL: 32 UG/DL — LOW (ref 45–165)
LACTATE BLDV-MCNC: 1.5 MMOL/L — SIGNIFICANT CHANGE UP (ref 0.5–2)
LYMPHOCYTES # BLD AUTO: 0.86 K/UL — LOW (ref 1–3.3)
LYMPHOCYTES # BLD AUTO: 29.8 % — SIGNIFICANT CHANGE UP (ref 13–44)
MAGNESIUM SERPL-MCNC: 1.8 MG/DL — SIGNIFICANT CHANGE UP (ref 1.6–2.6)
MCHC RBC-ENTMCNC: 29 PG — SIGNIFICANT CHANGE UP (ref 27–34)
MCHC RBC-ENTMCNC: 30.5 GM/DL — LOW (ref 32–36)
MCV RBC AUTO: 95.3 FL — SIGNIFICANT CHANGE UP (ref 80–100)
MONOCYTES # BLD AUTO: 0.33 K/UL — SIGNIFICANT CHANGE UP (ref 0–0.9)
MONOCYTES NFR BLD AUTO: 11.4 % — SIGNIFICANT CHANGE UP (ref 2–14)
NEUTROPHILS # BLD AUTO: 1.59 K/UL — LOW (ref 1.8–7.4)
NEUTROPHILS NFR BLD AUTO: 55 % — SIGNIFICANT CHANGE UP (ref 43–77)
NRBC # BLD: 2 /100 WBCS — SIGNIFICANT CHANGE UP
NRBC # FLD: 0.05 K/UL — HIGH
PCO2 BLDV: 60 MMHG — HIGH (ref 42–55)
PH BLDV: 7.4 — SIGNIFICANT CHANGE UP (ref 7.32–7.43)
PHOSPHATE SERPL-MCNC: 2.7 MG/DL — SIGNIFICANT CHANGE UP (ref 2.5–4.5)
PLATELET # BLD AUTO: 341 K/UL — SIGNIFICANT CHANGE UP (ref 150–400)
PO2 BLDV: 56 MMHG — SIGNIFICANT CHANGE UP
POTASSIUM BLDV-SCNC: 3.9 MMOL/L — SIGNIFICANT CHANGE UP (ref 3.5–5.1)
POTASSIUM SERPL-MCNC: 4.1 MMOL/L — SIGNIFICANT CHANGE UP (ref 3.5–5.3)
POTASSIUM SERPL-SCNC: 4.1 MMOL/L — SIGNIFICANT CHANGE UP (ref 3.5–5.3)
PROT SERPL-MCNC: 6.4 G/DL — SIGNIFICANT CHANGE UP (ref 6–8.3)
RBC # BLD: 3.65 M/UL — LOW (ref 4.2–5.8)
RBC # FLD: 19.9 % — HIGH (ref 10.3–14.5)
SAO2 % BLDV: 87.7 % — SIGNIFICANT CHANGE UP
SODIUM SERPL-SCNC: 136 MMOL/L — SIGNIFICANT CHANGE UP (ref 135–145)
TIBC SERPL-MCNC: 227 UG/DL — SIGNIFICANT CHANGE UP (ref 220–430)
UIBC SERPL-MCNC: 195 UG/DL — SIGNIFICANT CHANGE UP (ref 110–370)
WBC # BLD: 2.89 K/UL — LOW (ref 3.8–10.5)
WBC # FLD AUTO: 2.89 K/UL — LOW (ref 3.8–10.5)

## 2021-11-18 PROCEDURE — 99233 SBSQ HOSP IP/OBS HIGH 50: CPT | Mod: GC

## 2021-11-18 PROCEDURE — 99239 HOSP IP/OBS DSCHRG MGMT >30: CPT

## 2021-11-18 RX ORDER — FERROUS SULFATE 325(65) MG
1 TABLET ORAL
Qty: 90 | Refills: 0
Start: 2021-11-18 | End: 2022-02-15

## 2021-11-18 RX ORDER — FLUTICASONE PROPIONATE AND SALMETEROL 50; 250 UG/1; UG/1
1 POWDER ORAL; RESPIRATORY (INHALATION)
Qty: 0 | Refills: 0 | DISCHARGE

## 2021-11-18 RX ORDER — IPRATROPIUM/ALBUTEROL SULFATE 18-103MCG
3 AEROSOL WITH ADAPTER (GRAM) INHALATION
Qty: 360 | Refills: 0
Start: 2021-11-18 | End: 2021-12-17

## 2021-11-18 RX ORDER — FLUTICASONE PROPIONATE AND SALMETEROL 50; 250 UG/1; UG/1
1 POWDER ORAL; RESPIRATORY (INHALATION)
Qty: 90 | Refills: 0
Start: 2021-11-18 | End: 2022-02-15

## 2021-11-18 RX ORDER — TIOTROPIUM BROMIDE 18 UG/1
1 CAPSULE ORAL; RESPIRATORY (INHALATION)
Qty: 90 | Refills: 0
Start: 2021-11-18 | End: 2022-02-15

## 2021-11-18 RX ORDER — ALBUTEROL 90 UG/1
2 AEROSOL, METERED ORAL
Qty: 13 | Refills: 0
Start: 2021-11-18 | End: 2021-12-17

## 2021-11-18 RX ORDER — SODIUM,POTASSIUM PHOSPHATES 278-250MG
1 POWDER IN PACKET (EA) ORAL THREE TIMES A DAY
Refills: 0 | Status: DISCONTINUED | OUTPATIENT
Start: 2021-11-18 | End: 2021-11-18

## 2021-11-18 RX ORDER — MAGNESIUM SULFATE 500 MG/ML
2 VIAL (ML) INJECTION ONCE
Refills: 0 | Status: COMPLETED | OUTPATIENT
Start: 2021-11-18 | End: 2021-11-18

## 2021-11-18 RX ORDER — FERROUS SULFATE 325(65) MG
325 TABLET ORAL ONCE
Refills: 0 | Status: COMPLETED | OUTPATIENT
Start: 2021-11-18 | End: 2021-11-18

## 2021-11-18 RX ADMIN — BUDESONIDE AND FORMOTEROL FUMARATE DIHYDRATE 2 PUFF(S): 160; 4.5 AEROSOL RESPIRATORY (INHALATION) at 12:08

## 2021-11-18 RX ADMIN — Medication 325 MILLIGRAM(S): at 18:57

## 2021-11-18 RX ADMIN — Medication 50 GRAM(S): at 12:09

## 2021-11-18 RX ADMIN — Medication 40 MILLIGRAM(S): at 05:37

## 2021-11-18 RX ADMIN — Medication 3 MILLILITER(S): at 03:18

## 2021-11-18 RX ADMIN — Medication 3 MILLILITER(S): at 15:39

## 2021-11-18 RX ADMIN — PREGABALIN 1000 MICROGRAM(S): 225 CAPSULE ORAL at 12:09

## 2021-11-18 RX ADMIN — Medication 1 PACKET(S): at 13:15

## 2021-11-18 RX ADMIN — Medication 3 MILLILITER(S): at 11:18

## 2021-11-18 NOTE — PROGRESS NOTE ADULT - ASSESSMENT
Mr. Gomez is a 80M with Bladder Cancer (Dx: 01/2020 initially in remission after chemo & radiation, recurrence in 06/2021 - now C6D4 of Gemcitabine + Carboplatin), HTN (no meds), COPD (not on home O2), DVT (off eliquis since 03/2021 after clearance) who is admitted for evaluation and management of acute hypoxic respiratory failure with exact etiology unknown but potentially cardiogenic, primary pulm, or iatrogenic (ico gemcitabine). Mr. Gomez is a 80M with Bladder Cancer (Dx: 01/2020 initially in remission after chemo & radiation, recurrence in 06/2021 - now C6D4 of Gemcitabine + Carboplatin), HTN (no meds), COPD (not on home O2), DVT (off eliquis since 03/2021 after clearance) who is admitted for evaluation and management of acute hypoxic respiratory failure with exact etiology unknown but at this point, likely a combination of his underlying COPD as well as pHTN.

## 2021-11-18 NOTE — PROGRESS NOTE ADULT - ASSESSMENT
SHERYL DELONG is a 80y Male who presents with a chief complaint of respiratory failure    Bladder Cancer  - Patient is on carboplatin + gemcitabine. Last chemotherapy administration was on November 12th, 2021.   - No treatment while inpatient.  - On discharge, will follow-up with Dr. Willingham.    Hypoxic Respiratory Failure  - Patient with known severe emphysema with multiple lung nodular opacities and masses; previous biopsy had been benign.  - abx per pulm  - pt does not want another lung bx  - copd exacerbation, iproving    d/w pt, will follow.

## 2021-11-18 NOTE — PROGRESS NOTE ADULT - SUBJECTIVE AND OBJECTIVE BOX
CHIEF COMPLAINT:    Interval Events: Patient feels better. O2 weaned to 3L today.    REVIEW OF SYSTEMS:  Constitutional: [ ] negative [ ] fevers [ ] chills [ ] weight loss [ ] weight gain  HEENT: [ ] negative [ ] dry eyes [ ] eye irritation [ ] postnasal drip [ ] nasal congestion  CV: [ ] negative  [ ] chest pain [ ] orthopnea [ ] palpitations [ ] murmur  Resp: [ ] negative [ ] cough [ ] shortness of breath [X] dyspnea [ ] wheezing [ ] sputum [ ] hemoptysis  GI: [ ] negative [ ] nausea [ ] vomiting [ ] diarrhea [ ] constipation [ ] abd pain [ ] dysphagia   : [ ] negative [ ] dysuria [ ] nocturia [ ] hematuria [ ] increased urinary frequency  Musculoskeletal: [ ] negative [ ] back pain [ ] myalgias [ ] arthralgias [ ] fracture  Skin: [ ] negative [ ] rash [ ] itch  Neurological: [ ] negative [ ] headache [ ] dizziness [ ] syncope [ ] weakness [ ] numbness  Psychiatric: [ ] negative [ ] anxiety [ ] depression  Endocrine: [ ] negative [ ] diabetes [ ] thyroid problem  Hematologic/Lymphatic: [ ] negative [ ] anemia [ ] bleeding problem  Allergic/Immunologic: [ ] negative [ ] itchy eyes [ ] nasal discharge [ ] hives [ ] angioedema  [X] All other systems negative  [ ] Unable to assess ROS because ________    OBJECTIVE:  ICU Vital Signs Last 24 Hrs  T(C): 36.8 (18 Nov 2021 11:38), Max: 37.1 (17 Nov 2021 13:25)  T(F): 98.2 (18 Nov 2021 11:38), Max: 98.8 (17 Nov 2021 13:25)  HR: 74 (18 Nov 2021 11:38) (74 - 96)  BP: 112/74 (18 Nov 2021 11:38) (106/60 - 117/61)  BP(mean): --  ABP: --  ABP(mean): --  RR: 18 (18 Nov 2021 11:38) (17 - 19)  SpO2: 96% (18 Nov 2021 11:38) (88% - 98%)        11-17 @ 07:01  -  11-18 @ 07:00  --------------------------------------------------------  IN: 240 mL / OUT: 950 mL / NET: -710 mL      CAPILLARY BLOOD GLUCOSE      POCT Blood Glucose.: 96 mg/dL (18 Nov 2021 11:24)      PHYSICAL EXAM:  General: NAD, resting comfortably  HEENT: EOMI, PERRLA  Respiratory: Diffuse crackles bilaterally, no wheezing, rales  Cardiovascular: S1S2, RRR  Abdomen: Soft, NTND, BS+  Extremities: No peripheral edema  neuro: non focal exam, symmetric strength  psych: not anxious, alert and oriented x 3    HOSPITAL MEDICATIONS:  MEDICATIONS  (STANDING):  albuterol/ipratropium for Nebulization 3 milliLiter(s) Nebulizer every 6 hours  budesonide 160 MICROgram(s)/formoterol 4.5 MICROgram(s) Inhaler 2 Puff(s) Inhalation two times a day  cyanocobalamin 1000 MICROGram(s) Oral daily  enoxaparin Injectable 40 milliGRAM(s) SubCutaneous at bedtime  potassium phosphate / sodium phosphate Powder (PHOS-NaK) 1 Packet(s) Oral three times a day  predniSONE   Tablet 40 milliGRAM(s) Oral daily    MEDICATIONS  (PRN):  nicotine -  14 mG/24Hr(s) Patch 1 patch Transdermal daily PRN For Tobacco Cravings only      LABS:                        10.6   2.89  )-----------( 341      ( 18 Nov 2021 07:43 )             34.8     Hgb Trend: 10.6<--, 10.4<--, 10.8<--, 10.4<--  11-18    136  |  95<L>  |  10  ----------------------------<  57<L>  4.1   |  32<H>  |  0.93    Ca    8.6      18 Nov 2021 07:43  Phos  2.7     11-18  Mg     1.80     11-18    TPro  6.4  /  Alb  2.8<L>  /  TBili  0.7  /  DBili  x   /  AST  17  /  ALT  12  /  AlkPhos  53  11-18    Creatinine Trend: 0.93<--, 0.99<--, 0.99<--, 1.34<--        Venous Blood Gas:  11-18 @ 07:29  7.40/60/56/37/87.7  VBG Lactate: 1.5  Venous Blood Gas:  11-17 @ 06:26  7.39/65/58/39/88.5  VBG Lactate: 2.6      MICROBIOLOGY:       RADIOLOGY:  [ ] Reviewed and interpreted by me    PULMONARY FUNCTION TESTS:    EKG: CHIEF COMPLAINT:    Interval Events: Patient feels better. O2 weaned to 3L today.    REVIEW OF SYSTEMS:  CONSTITUTIONAL: No fever, chills  EYES: No eye pain, visual disturbances  ENMT:  No difficulty hearing, No sinus or throat pain  RESPIRATORY: No cough, wheezing, hemoptysis; + sob though improving  CARDIOVASCULAR: No chest pain, palpitations  GASTROINTESTINAL: No abdominal or epigastric pain. No nausea, vomiting, or hematemesis; No diarrhea. No melena or hematochezia.  GENITOURINARY: No dysuria, frequency, hematuria  NEUROLOGICAL: No headaches  ENDOCRINE: No heat or cold intolerance  MUSCULOSKELETAL: No joint pain or swelling; No muscle, back, or extremity pain    OBJECTIVE:  ICU Vital Signs Last 24 Hrs  T(C): 36.8 (18 Nov 2021 11:38), Max: 37.1 (17 Nov 2021 13:25)  T(F): 98.2 (18 Nov 2021 11:38), Max: 98.8 (17 Nov 2021 13:25)  HR: 74 (18 Nov 2021 11:38) (74 - 96)  BP: 112/74 (18 Nov 2021 11:38) (106/60 - 117/61)  BP(mean): --  ABP: --  ABP(mean): --  RR: 18 (18 Nov 2021 11:38) (17 - 19)  SpO2: 96% (18 Nov 2021 11:38) (88% - 98%)        11-17 @ 07:01  -  11-18 @ 07:00  --------------------------------------------------------  IN: 240 mL / OUT: 950 mL / NET: -710 mL      CAPILLARY BLOOD GLUCOSE      POCT Blood Glucose.: 96 mg/dL (18 Nov 2021 11:24)      PHYSICAL EXAM:  General: NAD, resting comfortably  HEENT: EOMI, PERRLA  Respiratory: Diffuse crackles bilaterally, no wheezing, rales, rhonchi  Cardiovascular: S1S2, RRR  Abdomen: Soft, NTND, BS+  Extremities: No peripheral edema, no cyanosis, mild clubbing  neuro: non focal exam, symmetric strength  psych: not anxious, alert and oriented x 3    HOSPITAL MEDICATIONS:  MEDICATIONS  (STANDING):  albuterol/ipratropium for Nebulization 3 milliLiter(s) Nebulizer every 6 hours  budesonide 160 MICROgram(s)/formoterol 4.5 MICROgram(s) Inhaler 2 Puff(s) Inhalation two times a day  cyanocobalamin 1000 MICROGram(s) Oral daily  enoxaparin Injectable 40 milliGRAM(s) SubCutaneous at bedtime  potassium phosphate / sodium phosphate Powder (PHOS-NaK) 1 Packet(s) Oral three times a day  predniSONE   Tablet 40 milliGRAM(s) Oral daily    MEDICATIONS  (PRN):  nicotine -  14 mG/24Hr(s) Patch 1 patch Transdermal daily PRN For Tobacco Cravings only      LABS:                        10.6   2.89  )-----------( 341      ( 18 Nov 2021 07:43 )             34.8     Hgb Trend: 10.6<--, 10.4<--, 10.8<--, 10.4<--  11-18    136  |  95<L>  |  10  ----------------------------<  57<L>  4.1   |  32<H>  |  0.93    Ca    8.6      18 Nov 2021 07:43  Phos  2.7     11-18  Mg     1.80     11-18    TPro  6.4  /  Alb  2.8<L>  /  TBili  0.7  /  DBili  x   /  AST  17  /  ALT  12  /  AlkPhos  53  11-18    Creatinine Trend: 0.93<--, 0.99<--, 0.99<--, 1.34<--        Venous Blood Gas:  11-18 @ 07:29  7.40/60/56/37/87.7  VBG Lactate: 1.5  Venous Blood Gas:  11-17 @ 06:26  7.39/65/58/39/88.5  VBG Lactate: 2.6      MICROBIOLOGY:       RADIOLOGY:  [ ] Reviewed and interpreted by me    PULMONARY FUNCTION TESTS:    EKG:

## 2021-11-18 NOTE — PROGRESS NOTE ADULT - PROBLEM SELECTOR PLAN 3
Glucose 66 on morning BMP. (11/16/2021) Likely 2/2 poor PO intake based on full tray at breakfast & dinner. Encouraged PO intake, gave juice. Started on scheduled finger sticks, will avoid hypoglycemic agents. Glucose 66 on morning BMP. (11/16/2021) Likely 2/2 poor PO intake based on full tray at breakfast & dinner. Encouraged PO intake, gave juice. Started on scheduled finger sticks, will avoid hypoglycemic agents. Essentially resolved now with improved intake.

## 2021-11-18 NOTE — PROGRESS NOTE ADULT - ASSESSMENT
Patient is a 79 yo M with bladder cancer (Dx: 01/2020, recurrence in 06/2021 on chemo C6 D3 Carboplatin, Gemcitabine), COPD, DVT (previously on Eliquis until 03/2021) who was initially admitted on 11/15/21 after presenting with SOB and fever with productive cough x4 days with symptoms starting 1 to 2 days after most recent chemotherapy session. Pulmonary consulted for persistent hypoxemia.

## 2021-11-18 NOTE — PHARMACOTHERAPY INTERVENTION NOTE - INTERVENTION TYPE RECOOMEND
Therapy Recommended - Alternative treatment
Therapy Recommended - Alternative treatment
Therapy Recommended - Additional therapy
Therapy Recommended - Alternative treatment

## 2021-11-18 NOTE — PROVIDER CONTACT NOTE (OTHER) - ACTION/TREATMENT ORDERED:
Breakfast served, PO intake encouraged. Continuing to monitor.
safety maintained, will continue to monitor

## 2021-11-18 NOTE — PROVIDER CONTACT NOTE (OTHER) - BACKGROUND
81yo M with hx bladder ca, COPD, DVT, admitted for SOB with acute hypoxic respiratory failure s/p chemo treatment

## 2021-11-18 NOTE — PROGRESS NOTE ADULT - ATTENDING COMMENTS
80 M with bladder ca on chemo, COPD (not on home O2), DVT hx (not on a/c) here with sob/cough after chemo session, found to have acute hypoxemic respiratory failure, possibly due to copd exacerbation    Pt has extensive emphysema on imaging; was told in past by pulmonologist that he needs to be on oxygen chronically but he refused.  Has had lung biopsy at outside facility in 2019, but doesn't remember where or of what, just that it was non diagnostic.     # acute hypoxemic respiratory failure   # COPD exacerbation  - most likely COPD exacerbation causing hypoxemia  - would treat with standing nebulizers for now  - prednisone 40 mg po daily x 5 days total  - says he uses advair at home but per report, wife says he is not compliant    #abnormal ct chest  - has multiple calcified opacities, one new since September   - could be new lung cancer or metastasis from known bladder ca; had non diagnosed biopsy in past  - patient is very adamant that he does NOT want another biopsy of his lung given his bad experience in 2019; he understands this could represent lung cancer but he still is refusing intervention  - pt should follow up with his outpatient pulmonologist    #shortness of breath   - likely related to copd exacerbation, but given reported fever, would check sputum culture  - okay to monitor off abx

## 2021-11-18 NOTE — PROGRESS NOTE ADULT - PROBLEM SELECTOR PLAN 1
CT chest notable for severe emphysema and mostly stable, nodular opacities and masses, some of which are calcified as well as new 1.5 x 1.3 cm right upper lobe nodular opacity (2:31). As per patient and chart review, patient had prior biopsy attempt which was non diagnostic and patient has and currently refuses repeat biopsy. TTE notable for RV enlargement with decreased RVSF with estimated RVSP 58 mm Hg. Most likely COPD exacerbation vs chemotherapy related pulmonary injury with component of Group 3 pHTN. Unclear how much of respiratory failure is chronic in nature as patient may have been prescribed home O2 in past  - Complete Prednisone 40mg x5 days  - Duonebs q6h PRN  - Wean O2 as tolerated  - Please obtain collateral from outpatient pulm Dr. Ramírez Aleman  - Patient currently declining workup of pulmonary masses (lung malignancy vs bladder mets?)  - No objections to discharge from pulmonary standpoint if discharged on home O2, home Advair, completion of course of steroids and follow up with his pulmonologist    Ap Leonard MD  Pulmonary & Critical Care Fellow  [phone]

## 2021-11-18 NOTE — PROGRESS NOTE ADULT - PROBLEM SELECTOR PLAN 2
Has known COPD 2/2 smoking hx. Not obvious in exacerbation (no increased sputum, no worsened cough) despite hypoxia. Received ATC Duonebs Q6H previously. Not sure what his ideal regimen would be outside the hospital.  - C/W Home Advair BID  - C/W DuoNebs Q6H PRN  - Goal O2 88-92% (does not need to be > 94%) Has known COPD 2/2 smoking hx. Not obvious in exacerbation (no increased sputum, no worsened cough) despite hypoxia. Records requested from OP pulm but not sent yet.  - C/W Home Advair BID  - C/W DuoNebs Q6H DEANNE  - Started Pred 40mg po x5d per pulm recs  - Goal O2 88-92% (does not need to be > 94%)

## 2021-11-18 NOTE — DISCHARGE NOTE NURSING/CASE MANAGEMENT/SOCIAL WORK - PATIENT PORTAL LINK FT
You can access the FollowMyHealth Patient Portal offered by [facility] by registering at the following website: [email or web address]. By joining [employer]’s FollowMyHealth portal, you will also be able to view your health information using other applications (apps) compatible with our system.

## 2021-11-18 NOTE — CHART NOTE - NSCHARTNOTEFT_GEN_A_CORE
Mr. Gomez's resting O2 saturation is 88% today on room air, indicating his need for persistent oxygen supplementation even at rest.    Chavo Warner MD PGY-1

## 2021-11-18 NOTE — PHARMACOTHERAPY INTERVENTION NOTE - PROVIDER CONTACTED
Internal Medicine Team 3

## 2021-11-18 NOTE — PROGRESS NOTE ADULT - PROBLEM SELECTOR PROBLEM 1
Acute respiratory failure with hypoxia
Dyspnea
Acute respiratory failure with hypoxia

## 2021-11-18 NOTE — PROGRESS NOTE ADULT - REASON FOR ADMISSION
Acute Hypoxic Respiratory Failure

## 2021-11-18 NOTE — PROVIDER CONTACT NOTE (OTHER) - ASSESSMENT
Patient A+Ox4, denies any symptoms of hypoglycemia, denies chest pain or SOB. No acute s/s of distress noted.
11 beats of vtach per tele tech, asymptomatic

## 2021-11-18 NOTE — PROGRESS NOTE ADULT - SUBJECTIVE AND OBJECTIVE BOX
Chavo Warner M.D.  [facility] PGY-1    PROGRESS NOTE:     Patient is a 80y old  Male who presents with a chief complaint of Acute Hypoxic Respiratory Failure (17 Nov 2021 16:46)      -OVERNIGHT EVENTS-      -SUBJECTIVE-      MEDICATIONS  (STANDING):  albuterol/ipratropium for Nebulization 3 milliLiter(s) Nebulizer every 6 hours  budesonide 160 MICROgram(s)/formoterol 4.5 MICROgram(s) Inhaler 2 Puff(s) Inhalation two times a day  cyanocobalamin 1000 MICROGram(s) Oral daily  enoxaparin Injectable 40 milliGRAM(s) SubCutaneous at bedtime  predniSONE   Tablet 40 milliGRAM(s) Oral daily    MEDICATIONS  (PRN):  nicotine -  14 mG/24Hr(s) Patch 1 patch Transdermal daily PRN For Tobacco Cravings only      -OBJECTIVE-    CAPILLARY BLOOD GLUCOSE      POCT Blood Glucose.: 91 mg/dL (17 Nov 2021 20:56)  POCT Blood Glucose.: 107 mg/dL (17 Nov 2021 16:39)  POCT Blood Glucose.: 70 mg/dL (17 Nov 2021 11:20)  POCT Blood Glucose.: 97 mg/dL (17 Nov 2021 07:52)    I&O's Summary    16 Nov 2021 07:01  -  17 Nov 2021 07:00  --------------------------------------------------------  IN: 0 mL / OUT: 1500 mL / NET: -1500 mL    17 Nov 2021 07:01  -  18 Nov 2021 06:54  --------------------------------------------------------  IN: 240 mL / OUT: 950 mL / NET: -710 mL        VITAL SIGNS  Vital Signs Last 24 Hrs  T(C): 36.6 (18 Nov 2021 05:35), Max: 37.1 (17 Nov 2021 13:25)  T(F): 97.9 (18 Nov 2021 05:35), Max: 98.8 (17 Nov 2021 13:25)  HR: 96 (18 Nov 2021 05:35) (90 - 96)  BP: 117/61 (18 Nov 2021 05:35) (104/67 - 117/61)  BP(mean): --  RR: 17 (18 Nov 2021 05:35) (17 - 19)  SpO2: 95% (18 Nov 2021 05:35) (92% - 98%)    PHYSICAL EXAM:  GENERAL/CONSTITUTIONAL: NAD, Awake, AOx3 (person, place, time)  HEENT: NCAT  ---EYES: no scleral icterus, EOMI, PERRLA  ---ENMT: MMM,   ---NECK: No palpable masses; no thyromegaly  CARDIO: RRR. Normal S1/S2, no m/r/g.  RESP: Normal respiratory effort; CTAB, no w/r/r  ABD: Soft, NTND; +BS.   : No CVAT.   MSK: No obvious deformity or ROM deficit.  ---EXTREMITIES: No peripheral edema. Peripheral pulses 2+ x4.  SKIN: Warm, dry. No rashes.   NEURO: Moves all four extremities spontaneously  PSYCH: Appropriate mood & affect. No VH/AH. No SI/HI.    LABS:                        10.4   5.40  )-----------( 397      ( 17 Nov 2021 06:26 )             34.7     11-17    143  |  100  |  12  ----------------------------<  76  4.0   |  36<H>  |  0.99    Ca    8.7      17 Nov 2021 06:26  Phos  2.7     11-17  Mg     1.80     11-17    TPro  6.4  /  Alb  3.0<L>  /  TBili  0.6  /  DBili  x   /  AST  16  /  ALT  14  /  AlkPhos  60  11-17              MICROBIOLOGY      IMAGING        COORDINATION OF CARE:  ---PCP:  ---Consultants:   Chavo Warner M.D.  [facility] PGY-1    PROGRESS NOTE:     Patient is a 80y old  Male who presents with a chief complaint of Acute Hypoxic Respiratory Failure (17 Nov 2021 16:46)      -OVERNIGHT EVENTS-  NAEON    -SUBJECTIVE-  Mr. Gomez says he feels much better today. He says his breathing is better; he does endorse cough but that this is normal for him. He denies sputum production. He is curious to know when he can leave and is hopeful for a discharge today.    MEDICATIONS  (STANDING):  albuterol/ipratropium for Nebulization 3 milliLiter(s) Nebulizer every 6 hours  budesonide 160 MICROgram(s)/formoterol 4.5 MICROgram(s) Inhaler 2 Puff(s) Inhalation two times a day  cyanocobalamin 1000 MICROGram(s) Oral daily  enoxaparin Injectable 40 milliGRAM(s) SubCutaneous at bedtime  predniSONE   Tablet 40 milliGRAM(s) Oral daily    MEDICATIONS  (PRN):  nicotine -  14 mG/24Hr(s) Patch 1 patch Transdermal daily PRN For Tobacco Cravings only      -OBJECTIVE-    CAPILLARY BLOOD GLUCOSE      POCT Blood Glucose.: 91 mg/dL (17 Nov 2021 20:56)  POCT Blood Glucose.: 107 mg/dL (17 Nov 2021 16:39)  POCT Blood Glucose.: 70 mg/dL (17 Nov 2021 11:20)  POCT Blood Glucose.: 97 mg/dL (17 Nov 2021 07:52)    I&O's Summary    16 Nov 2021 07:01  -  17 Nov 2021 07:00  --------------------------------------------------------  IN: 0 mL / OUT: 1500 mL / NET: -1500 mL    17 Nov 2021 07:01  -  18 Nov 2021 06:54  --------------------------------------------------------  IN: 240 mL / OUT: 950 mL / NET: -710 mL        VITAL SIGNS  Vital Signs Last 24 Hrs  T(C): 36.6 (18 Nov 2021 05:35), Max: 37.1 (17 Nov 2021 13:25)  T(F): 97.9 (18 Nov 2021 05:35), Max: 98.8 (17 Nov 2021 13:25)  HR: 96 (18 Nov 2021 05:35) (90 - 96)  BP: 117/61 (18 Nov 2021 05:35) (104/67 - 117/61)  BP(mean): --  RR: 17 (18 Nov 2021 05:35) (17 - 19)  SpO2: 95% (18 Nov 2021 05:35) (92% - 98%)    PHYSICAL EXAM:  GENERAL/CONSTITUTIONAL: NAD, Awake, AOx3 (person, place, time)  HEENT: NCAT  ---EYES: no scleral icterus, EOMI  ---ENMT: MMM  CARDIO: RRR. Normal S1/S2, no m/r/g.  RESP: Normal respiratory effort; CTAB, no w/r/r  ABD: Soft, NTND; +BS.   MSK: No obvious deformity or ROM deficit.  ---EXTREMITIES: No peripheral edema. Peripheral pulses 2+ x4.  SKIN: Warm, dry. No rashes.   NEURO: Moves all four extremities spontaneously  PSYCH: Appropriate mood & affect.     LABS:                        10.4   5.40  )-----------( 397      ( 17 Nov 2021 06:26 )             34.7     11-17    143  |  100  |  12  ----------------------------<  76  4.0   |  36<H>  |  0.99    Ca    8.7      17 Nov 2021 06:26  Phos  2.7     11-17  Mg     1.80     11-17    TPro  6.4  /  Alb  3.0<L>  /  TBili  0.6  /  DBili  x   /  AST  16  /  ALT  14  /  AlkPhos  60  11-17      MICROBIOLOGY  No interval micro data, awaiting Quant Gold results    IMAGING  No interval imaging      COORDINATION OF CARE:  ---PCP: Dr. Francisco  ---Consultants: Pulm (incl. OP Dr. Aleman - records requested, not received yet) | Palliative Care | Oncology

## 2021-11-18 NOTE — PROGRESS NOTE ADULT - ATTENDING COMMENTS
Seen by me early this afternoon, doing much better today, wife at bedside  Saturating well on NC 3-4 lts with good saturation  Apprec Pulmonary recs  Plan for dc home today on home O2, prednisone for total of 5 days, advair and adding spiriva  Nebulizer machine prescribed for patient as well  Discussed with patient and wife the importance of quitting smoking giving presence of Oxygen tank at home and hazard risk, verbalized understanding  Rest as above

## 2021-11-18 NOTE — PHARMACOTHERAPY INTERVENTION NOTE - COMMENTS
Team decided to switch cefepime to Augmentin. Noticed pt. has penicllin allergy (documented as rash; resident says pt. stated allergy was to penicillin) and discussed alternatives with team. Decision was made to switch to ceftriaxone as pt. tolerated cefepime.
Medication history is complete. Medication list updated in Outpatient Medication Record (OMR). Please call spectra [phone] if you have any questions.
Recommended discontinuing the nicotine patch and switching to either the gum or lozenge as the patch isn't typically used as needed, while the gum and lozenge are used as-needed for cravings.
Recommended obtaining an iron panel as pt. is anemic and his chemotherapy (carboplatin and gemcitabine) have high chances of causing anemia, 71-90% and 68%, respectively.
attempted to reach pharmacy, no response
Recommended ferrous sulfate 325 mG every other day as an outpatient medication as the patient's total iron level was low to 32, and his carboplatin and gemcitabine chemotherapy medications have high chances of causing anemia.
Recommended switching from heparin to Lovenox 40 mG SC QD for DVT ppx as BRISA is resolving.

## 2021-11-18 NOTE — PROGRESS NOTE ADULT - PROBLEM SELECTOR PLAN 1
Presented with relatively acute SOB (x4d), found to have O2 sat in mid-80s with known emphysema/COPD, LE swelling. BNP elevated (even when accounting for age), trop elevated (delta > 6). Now with TTE showing moderate pHTN raising c/f RHF in this context (i.e. Group 3 pHTN).  Diagnostics  - RVP negative, PCT 0.12 (mildly elevated but not likely systemic sepsis)  - Pulm C/S, Appreciate Recs  [ ] F/U BCx (NGTD), UCx (NGTD), MRSA swab (-)  [ ] Quantiferon TB    Therapeutics  - D/C ABx given not covering for COPD exacerbation, no obvious signs of PNA Presented with relatively acute SOB (x4d), found to have O2 sat in mid-80s with known emphysema/COPD, LE swelling. BNP elevated (even when accounting for age), trop elevated (delta > 6). Now with TTE showing moderate pHTN raising c/f RHF in this context (i.e. Group 3 pHTN).  Diagnostics  - RVP negative, PCT 0.12 (mildly elevated but not likely systemic sepsis)  -  TTE with evidence of RVSD, pHTN c/f right-sided HF as etiology  - Pulm C/S, Appreciate Recs  [ ] F/U BCx (NGTD), UCx (NGTD), MRSA swab (-) (though MSSA positive)  [ ] Quantiferon TB    Therapeutics  - D/C ABx given not covering for COPD exacerbation, no obvious signs of PNA

## 2021-11-18 NOTE — PROGRESS NOTE ADULT - PROBLEM SELECTOR PLAN 10
Hospital Bundle  Fluids: PO ad eugenie  Electrolytes: Replete K > 4, Mg > 2, Phos > 3  Nutrition: Diet DASH  PPX  ---VTE: LVX  ---GI: N/A  ---Resp: IS  Access: PIV  Code Status: DNR/DNI (MOLST in Chart)  Dispo: Pending clinical stabilization, eventuall Home

## 2021-11-18 NOTE — PROGRESS NOTE ADULT - SUBJECTIVE AND OBJECTIVE BOX
Pt seen, feeling well    MEDICATIONS  (STANDING):  albuterol/ipratropium for Nebulization 3 milliLiter(s) Nebulizer every 6 hours  budesonide 160 MICROgram(s)/formoterol 4.5 MICROgram(s) Inhaler 2 Puff(s) Inhalation two times a day  cyanocobalamin 1000 MICROGram(s) Oral daily  enoxaparin Injectable 40 milliGRAM(s) SubCutaneous at bedtime  potassium phosphate / sodium phosphate Powder (PHOS-NaK) 1 Packet(s) Oral three times a day  predniSONE   Tablet 40 milliGRAM(s) Oral daily    MEDICATIONS  (PRN):  nicotine -  14 mG/24Hr(s) Patch 1 patch Transdermal daily PRN For Tobacco Cravings only      ROS  No fever, sweats, chills  No epistaxis, HA, sore throat  No CP, SOB, cough, sputum  No n/v/d, abd pain, melena, hematochezia  No edema  No rash  No anxiety  No back pain, joint pain  No bleeding, bruising  No dysuria, hematuria    Vital Signs Last 24 Hrs  T(C): 36.7 (18 Nov 2021 19:45), Max: 36.8 (18 Nov 2021 11:38)  T(F): 98 (18 Nov 2021 19:45), Max: 98.2 (18 Nov 2021 11:38)  HR: 85 (18 Nov 2021 19:45) (74 - 96)  BP: 109/62 (18 Nov 2021 19:45) (109/62 - 117/61)  BP(mean): --  RR: 17 (18 Nov 2021 19:45) (17 - 18)  SpO2: 94% (18 Nov 2021 19:45) (88% - 98%)    PE  NAD  Awake, alert  Anicteric  limited 2/2 covid pandemic                          10.6   2.89  )-----------( 341      ( 18 Nov 2021 07:43 )             34.8       11-18    136  |  95<L>  |  10  ----------------------------<  57<L>  4.1   |  32<H>  |  0.93    Ca    8.6      18 Nov 2021 07:43  Phos  2.7     11-18  Mg     1.80     11-18    TPro  6.4  /  Alb  2.8<L>  /  TBili  0.7  /  DBili  x   /  AST  17  /  ALT  12  /  AlkPhos  53  11-18

## 2021-11-18 NOTE — PROGRESS NOTE ADULT - PROBLEM SELECTOR PLAN 6
Creatinine elevated to 1.3 from b/l ~0.8, improved to .~9 after lasix challenge yesterday. Likely some component of poor ECV in this context.  Will CTM, further diuresis as above, awaiting echo.  - lasix challenge as above, CTM  - avoid nephrotoxics Creatinine elevated to 1.3 from b/l ~0.8, improved to .~9 after lasix challenge yesterday. Likely some component of poor ECV in this context.    - avoid nephrotoxics

## 2021-11-20 LAB
CULTURE RESULTS: SIGNIFICANT CHANGE UP
CULTURE RESULTS: SIGNIFICANT CHANGE UP
SPECIMEN SOURCE: SIGNIFICANT CHANGE UP
SPECIMEN SOURCE: SIGNIFICANT CHANGE UP

## 2022-01-14 ENCOUNTER — INPATIENT (INPATIENT)
Facility: HOSPITAL | Age: 82
LOS: 2 days | Discharge: HOME HEALTH | End: 2022-01-17
Attending: STUDENT IN AN ORGANIZED HEALTH CARE EDUCATION/TRAINING PROGRAM | Admitting: STUDENT IN AN ORGANIZED HEALTH CARE EDUCATION/TRAINING PROGRAM
Payer: MEDICARE

## 2022-01-14 VITALS
TEMPERATURE: 98 F | OXYGEN SATURATION: 97 % | SYSTOLIC BLOOD PRESSURE: 150 MMHG | DIASTOLIC BLOOD PRESSURE: 82 MMHG | RESPIRATION RATE: 18 BRPM | HEIGHT: 72 IN | HEART RATE: 94 BPM

## 2022-01-14 DIAGNOSIS — Z98.49 CATARACT EXTRACTION STATUS, UNSPECIFIED EYE: Chronic | ICD-10-CM

## 2022-01-14 DIAGNOSIS — Z98.890 OTHER SPECIFIED POSTPROCEDURAL STATES: Chronic | ICD-10-CM

## 2022-01-14 DIAGNOSIS — Z90.49 ACQUIRED ABSENCE OF OTHER SPECIFIED PARTS OF DIGESTIVE TRACT: Chronic | ICD-10-CM

## 2022-01-14 LAB
ALBUMIN SERPL ELPH-MCNC: 3.6 G/DL — SIGNIFICANT CHANGE UP (ref 3.3–5)
ALP SERPL-CCNC: 59 U/L — SIGNIFICANT CHANGE UP (ref 40–120)
ALT FLD-CCNC: 9 U/L — SIGNIFICANT CHANGE UP (ref 4–41)
ANION GAP SERPL CALC-SCNC: 9 MMOL/L — SIGNIFICANT CHANGE UP (ref 7–14)
APPEARANCE UR: CLEAR — SIGNIFICANT CHANGE UP
AST SERPL-CCNC: 13 U/L — SIGNIFICANT CHANGE UP (ref 4–40)
BACTERIA # UR AUTO: NEGATIVE — SIGNIFICANT CHANGE UP
BASOPHILS # BLD AUTO: 0.04 K/UL — SIGNIFICANT CHANGE UP (ref 0–0.2)
BASOPHILS NFR BLD AUTO: 0.6 % — SIGNIFICANT CHANGE UP (ref 0–2)
BILIRUB SERPL-MCNC: 0.5 MG/DL — SIGNIFICANT CHANGE UP (ref 0.2–1.2)
BILIRUB UR-MCNC: NEGATIVE — SIGNIFICANT CHANGE UP
BLOOD GAS VENOUS COMPREHENSIVE RESULT: SIGNIFICANT CHANGE UP
BUN SERPL-MCNC: 12 MG/DL — SIGNIFICANT CHANGE UP (ref 7–23)
CALCIUM SERPL-MCNC: 9.3 MG/DL — SIGNIFICANT CHANGE UP (ref 8.4–10.5)
CHLORIDE SERPL-SCNC: 104 MMOL/L — SIGNIFICANT CHANGE UP (ref 98–107)
CO2 SERPL-SCNC: 30 MMOL/L — SIGNIFICANT CHANGE UP (ref 22–31)
COLOR SPEC: SIGNIFICANT CHANGE UP
CREAT SERPL-MCNC: 0.93 MG/DL — SIGNIFICANT CHANGE UP (ref 0.5–1.3)
DIFF PNL FLD: NEGATIVE — SIGNIFICANT CHANGE UP
EOSINOPHIL # BLD AUTO: 0.07 K/UL — SIGNIFICANT CHANGE UP (ref 0–0.5)
EOSINOPHIL NFR BLD AUTO: 1 % — SIGNIFICANT CHANGE UP (ref 0–6)
EPI CELLS # UR: 2 /HPF — SIGNIFICANT CHANGE UP (ref 0–5)
FLUAV AG NPH QL: SIGNIFICANT CHANGE UP
FLUBV AG NPH QL: SIGNIFICANT CHANGE UP
GLUCOSE SERPL-MCNC: 82 MG/DL — SIGNIFICANT CHANGE UP (ref 70–99)
GLUCOSE UR QL: NEGATIVE — SIGNIFICANT CHANGE UP
HCT VFR BLD CALC: 44.2 % — SIGNIFICANT CHANGE UP (ref 39–50)
HGB BLD-MCNC: 13.1 G/DL — SIGNIFICANT CHANGE UP (ref 13–17)
HYALINE CASTS # UR AUTO: 1 /LPF — SIGNIFICANT CHANGE UP (ref 0–7)
IANC: 3.87 K/UL — SIGNIFICANT CHANGE UP (ref 1.5–8.5)
IMM GRANULOCYTES NFR BLD AUTO: 0.1 % — SIGNIFICANT CHANGE UP (ref 0–1.5)
KETONES UR-MCNC: NEGATIVE — SIGNIFICANT CHANGE UP
LEUKOCYTE ESTERASE UR-ACNC: NEGATIVE — SIGNIFICANT CHANGE UP
LYMPHOCYTES # BLD AUTO: 1.8 K/UL — SIGNIFICANT CHANGE UP (ref 1–3.3)
LYMPHOCYTES # BLD AUTO: 26.7 % — SIGNIFICANT CHANGE UP (ref 13–44)
MAGNESIUM SERPL-MCNC: 1.9 MG/DL — SIGNIFICANT CHANGE UP (ref 1.6–2.6)
MCHC RBC-ENTMCNC: 28.2 PG — SIGNIFICANT CHANGE UP (ref 27–34)
MCHC RBC-ENTMCNC: 29.6 GM/DL — LOW (ref 32–36)
MCV RBC AUTO: 95.3 FL — SIGNIFICANT CHANGE UP (ref 80–100)
MONOCYTES # BLD AUTO: 0.96 K/UL — HIGH (ref 0–0.9)
MONOCYTES NFR BLD AUTO: 14.2 % — HIGH (ref 2–14)
NEUTROPHILS # BLD AUTO: 3.87 K/UL — SIGNIFICANT CHANGE UP (ref 1.8–7.4)
NEUTROPHILS NFR BLD AUTO: 57.4 % — SIGNIFICANT CHANGE UP (ref 43–77)
NITRITE UR-MCNC: NEGATIVE — SIGNIFICANT CHANGE UP
NRBC # BLD: 0 /100 WBCS — SIGNIFICANT CHANGE UP
NRBC # FLD: 0 K/UL — SIGNIFICANT CHANGE UP
NT-PROBNP SERPL-SCNC: 968 PG/ML — HIGH
PH UR: 7 — SIGNIFICANT CHANGE UP (ref 5–8)
PHOSPHATE SERPL-MCNC: 3.2 MG/DL — SIGNIFICANT CHANGE UP (ref 2.5–4.5)
PLATELET # BLD AUTO: 226 K/UL — SIGNIFICANT CHANGE UP (ref 150–400)
POTASSIUM SERPL-MCNC: 4.7 MMOL/L — SIGNIFICANT CHANGE UP (ref 3.5–5.3)
POTASSIUM SERPL-SCNC: 4.7 MMOL/L — SIGNIFICANT CHANGE UP (ref 3.5–5.3)
PROT SERPL-MCNC: 7.7 G/DL — SIGNIFICANT CHANGE UP (ref 6–8.3)
PROT UR-MCNC: ABNORMAL
RBC # BLD: 4.64 M/UL — SIGNIFICANT CHANGE UP (ref 4.2–5.8)
RBC # FLD: 23.2 % — HIGH (ref 10.3–14.5)
RBC CASTS # UR COMP ASSIST: 4 /HPF — SIGNIFICANT CHANGE UP (ref 0–4)
RSV RNA NPH QL NAA+NON-PROBE: SIGNIFICANT CHANGE UP
SARS-COV-2 RNA SPEC QL NAA+PROBE: SIGNIFICANT CHANGE UP
SODIUM SERPL-SCNC: 143 MMOL/L — SIGNIFICANT CHANGE UP (ref 135–145)
SP GR SPEC: 1.01 — SIGNIFICANT CHANGE UP (ref 1–1.05)
TROPONIN T, HIGH SENSITIVITY RESULT: 24 NG/L — SIGNIFICANT CHANGE UP
UROBILINOGEN FLD QL: SIGNIFICANT CHANGE UP
WBC # BLD: 6.75 K/UL — SIGNIFICANT CHANGE UP (ref 3.8–10.5)
WBC # FLD AUTO: 6.75 K/UL — SIGNIFICANT CHANGE UP (ref 3.8–10.5)
WBC UR QL: 9 /HPF — HIGH (ref 0–5)

## 2022-01-14 PROCEDURE — 99285 EMERGENCY DEPT VISIT HI MDM: CPT | Mod: CS,25

## 2022-01-14 PROCEDURE — 93010 ELECTROCARDIOGRAM REPORT: CPT

## 2022-01-14 PROCEDURE — 71045 X-RAY EXAM CHEST 1 VIEW: CPT | Mod: 26

## 2022-01-14 NOTE — ED PROVIDER NOTE - PHYSICAL EXAMINATION
General appearance: NAD, conversant, afebrile    Neck: Trachea midline; Full range of motion, supple   Pulm: Faint crackles at b/l bases, normal respiratory effort and no intercostal retractions, normal work of breathing   CV: RRR, No murmurs, rubs, or gallops   Abdomen: Soft, non-tender, non-distended; no guarding or rebound   Extremities: No peripheral edema    Skin: Dry, normal temperature, turgor and texture; no rash   Psych: Appropriate affect, cooperative; alert and oriented to person, place and time

## 2022-01-14 NOTE — ED ADULT NURSE NOTE - OBJECTIVE STATEMENT
pt. received to room 4 A&Ox4 cares for self ambulatory c/o weakness and difficulty breathing since yesterday. pt. endorses no sick contacts at this time. pt. endorses using a CPAP machine at home due to a "spot on his lung". pt. endorses feeling short of breath. pt. placed on 3L NC for comfort at this time. repsirations are even and unlabored, speaking in full and complete sentences, no stridor or gurgling noted. skin is intact. urinal provided, 200 mL of clear yellow urine noted. pt. denies CP, NVD, HA, palpitations, Fever, chills, dizziness. pending MD orders.

## 2022-01-14 NOTE — ED PROVIDER NOTE - PROGRESS NOTE DETAILS
Gokul, PGY3: cxr showed ptx, thoracic called will evaluate pt. Gokul, PGY3: spoke with thoracic surgery, recommending observation and repeat cxr in the morning. if worse will admit and if stable or decreased can d/c home. hospitalist and family aware. no beds in observation available, will hold in ED Leonid CURRY: Repeat CXR with stable small L pneumothorax. Pt reassessed by thoracic - now recommend admission, CT chest.  Pt is stable, no complaints, satting well on supplemental O2. Ayush Noriega D.O., PGY3 (Resident)  Patient signed out to me, hemodynmically stable, on 3L NC, no resp distress, sat > 95%. Baseline home O2 4L. Discussed with thoracic. No current intervention planned. 6hr CXR stable L PTX. Rec 6 hr CT chest and to eval for other pathology that may be preventing reexpansion, medical optimization. CT chest ordered. EKG in chart without diagnostic ischemic ekg changes. Discussed with hospitalist. Admit. Discussed workup and results with patient. Amenable to plan.

## 2022-01-14 NOTE — ED ADULT NURSE NOTE - NSIMPLEMENTINTERV_GEN_ALL_ED
Implemented All Fall with Harm Risk Interventions:  [place] to call system. Call bell, personal items and telephone within reach. Instruct patient to call for assistance. Room bathroom lighting operational. Non-slip footwear when patient is off stretcher. Physically safe environment: no spills, clutter or unnecessary equipment. Stretcher in lowest position, wheels locked, appropriate side rails in place. Provide visual cue, wrist band, yellow gown, etc. Monitor gait and stability. Monitor for mental status changes and reorient to person, place, and time. Review medications for side effects contributing to fall risk. Reinforce activity limits and safety measures with patient and family. Provide visual clues: red socks.

## 2022-01-14 NOTE — ED PROVIDER NOTE - SHIFT CHANGE DETAILS
MEGHNA:  Patient signed out to Dr. Jaquez pending CT surgery recs.  Patient hd stable at time of signout.

## 2022-01-14 NOTE — ED ADULT TRIAGE NOTE - CHIEF COMPLAINT QUOTE
weakness ,tiredness with diff breathing since yesterday. denies ch pains. normally on 3l,increased to 4 l today. ems reports  pt found with pulse ox 93 on 4 l on their arrival.

## 2022-01-14 NOTE — ED ADULT TRIAGE NOTE - DOMESTIC TRAVEL HIGH RISK QUESTION
----- Message from Hillary Hayes sent at 2018 11:28 AM CDT -----  Contact: mother, Frances Patecomorelia  Patient's mother, Frances Whiting is calling back regarding giving the patient medication and seeing how he does. Mother states she is not able to give him the medication. He is throwing it back up. Please call mother to advice at [phone]. Thanks!   No

## 2022-01-14 NOTE — ED PROVIDER NOTE - ATTENDING CONTRIBUTION TO CARE
I have personally performed a face to face bedside history and physical examination of this patient. I have discussed the history, examination, review of systems, assessment and plan of management with the resident. I have reviewed the electronic medical record and amended it to reflect my history, review of systems, physical exam, assessment and plan.    Brief HPI:  80 yo M with pmh copd, phtn, 4L O2 at baseline, bladder ca, presenting with generalized fatigue and shortness of breath x2 days.  Also states that 02 requirement has increased to 5L NC at home.  Denies fever, chills, nausea, vomiting, chest pain, cough, hemoptysis.      Vitals:   Reviewed    Exam:    GEN:  Non-toxic appearing, non-distressed, speaking full sentences, non-diaphoretic, AAOx3  HEENT:  NCAT, neck supple, EOMI, PERRLA, sclera anicteric, no conjunctival pallor or injection, no stridor, normal voice, no tonsillar exudate, uvula midline, tympanic membranes and external auditory canals normal appearing bilaterally   CV:  regular rhythm and rate, s1/s2 audible, no murmurs, rubs or gallops, peripheral pulses 2+ and symmetric  PULM:  non-labored respirations, diminished left sided lung sounds, no wheeze or crackles   ABD:  non distended, non-tender, no rebound, no guarding, negative Baez's sign, bowel sounds normal, no cvat  MSK:  no gross deformity, non-tender extremities and joints, range of motion grossly normal appearing, no extremity edema, extremities warm and well perfused   NEURO:  AAOx3, CN II-XII intact, motor 5/5 in upper and lower extremities bilaterally, sensation grossly intact in extremities and trunk  SKIN:  warm, dry, no rash or vesicles     A/P:  80 yo M with pmh copd, phtn, 4L O2 at baseline, bladder ca, presenting with generalized fatigue and shortness of breath x2 days.  On 4 LNC satting in high 90s.  Diminished bs on left sided lung exam.  Possible infectious etiology.  Will send labs, ekg, cxr.  Dispo pending.

## 2022-01-14 NOTE — ED PROVIDER NOTE - CLINICAL SUMMARY MEDICAL DECISION MAKING FREE TEXT BOX
80yo male with pmh copd, phtn, 4L O2 at baseline, bladder ca, presenting with generalized fatigue, shortness of breath.  Saturating 97% during history on 3L and patient currently denies sob.  Will get labs to check for acs, metabolic/ electrolyte derangements, infection.  Not c/w pe.  No wheezing, cough, less likely copd exacerbation.  May be 2/2 phtn but patient well appearing here and if work up unremarkable, likely plan for follow up outpatient.

## 2022-01-14 NOTE — ED PROVIDER NOTE - OBJECTIVE STATEMENT
82yo male with pmh copd, phtn, 4L O2 at baseline, bladder ca, presenting with generalized fatigue, shortness of breath.  Symptoms present over past few days.  States he has felt run down and weak all over.  Lives with wife who has been helping him but thinks he can still care for himself.  Normally on 4L O2 at home but noted his spo2 was low 90s so he went up to 5/6 with some improvement.  Currently denies shortness of breath.  No chest pain. LE edema, n/v, abd pain, urinary symptoms, cough, fever.

## 2022-01-15 DIAGNOSIS — I10 ESSENTIAL (PRIMARY) HYPERTENSION: ICD-10-CM

## 2022-01-15 DIAGNOSIS — Z87.828 PERSONAL HISTORY OF OTHER (HEALED) PHYSICAL INJURY AND TRAUMA: Chronic | ICD-10-CM

## 2022-01-15 DIAGNOSIS — J43.9 EMPHYSEMA, UNSPECIFIED: ICD-10-CM

## 2022-01-15 DIAGNOSIS — J93.9 PNEUMOTHORAX, UNSPECIFIED: ICD-10-CM

## 2022-01-15 DIAGNOSIS — C67.9 MALIGNANT NEOPLASM OF BLADDER, UNSPECIFIED: ICD-10-CM

## 2022-01-15 DIAGNOSIS — Z29.9 ENCOUNTER FOR PROPHYLACTIC MEASURES, UNSPECIFIED: ICD-10-CM

## 2022-01-15 LAB
ALBUMIN SERPL ELPH-MCNC: 3.2 G/DL — LOW (ref 3.3–5)
ALP SERPL-CCNC: 53 U/L — SIGNIFICANT CHANGE UP (ref 40–120)
ALT FLD-CCNC: 7 U/L — SIGNIFICANT CHANGE UP (ref 4–41)
ANION GAP SERPL CALC-SCNC: 10 MMOL/L — SIGNIFICANT CHANGE UP (ref 7–14)
APTT BLD: 31.2 SEC — SIGNIFICANT CHANGE UP (ref 27–36.3)
AST SERPL-CCNC: 15 U/L — SIGNIFICANT CHANGE UP (ref 4–40)
BASOPHILS # BLD AUTO: 0.03 K/UL — SIGNIFICANT CHANGE UP (ref 0–0.2)
BASOPHILS NFR BLD AUTO: 0.5 % — SIGNIFICANT CHANGE UP (ref 0–2)
BILIRUB SERPL-MCNC: 0.5 MG/DL — SIGNIFICANT CHANGE UP (ref 0.2–1.2)
BUN SERPL-MCNC: 10 MG/DL — SIGNIFICANT CHANGE UP (ref 7–23)
CALCIUM SERPL-MCNC: 8.7 MG/DL — SIGNIFICANT CHANGE UP (ref 8.4–10.5)
CHLORIDE SERPL-SCNC: 102 MMOL/L — SIGNIFICANT CHANGE UP (ref 98–107)
CHOLEST SERPL-MCNC: 131 MG/DL — SIGNIFICANT CHANGE UP
CO2 SERPL-SCNC: 29 MMOL/L — SIGNIFICANT CHANGE UP (ref 22–31)
CREAT SERPL-MCNC: 0.83 MG/DL — SIGNIFICANT CHANGE UP (ref 0.5–1.3)
EOSINOPHIL # BLD AUTO: 0.07 K/UL — SIGNIFICANT CHANGE UP (ref 0–0.5)
EOSINOPHIL NFR BLD AUTO: 1.1 % — SIGNIFICANT CHANGE UP (ref 0–6)
GLUCOSE SERPL-MCNC: 78 MG/DL — SIGNIFICANT CHANGE UP (ref 70–99)
HCT VFR BLD CALC: 41.2 % — SIGNIFICANT CHANGE UP (ref 39–50)
HDLC SERPL-MCNC: 78 MG/DL — SIGNIFICANT CHANGE UP
HGB BLD-MCNC: 12.6 G/DL — LOW (ref 13–17)
IANC: 3.53 K/UL — SIGNIFICANT CHANGE UP (ref 1.5–8.5)
IMM GRANULOCYTES NFR BLD AUTO: 0.2 % — SIGNIFICANT CHANGE UP (ref 0–1.5)
INR BLD: 1.32 RATIO — HIGH (ref 0.88–1.16)
LIPID PNL WITH DIRECT LDL SERPL: 43 MG/DL — SIGNIFICANT CHANGE UP
LYMPHOCYTES # BLD AUTO: 1.7 K/UL — SIGNIFICANT CHANGE UP (ref 1–3.3)
LYMPHOCYTES # BLD AUTO: 26.9 % — SIGNIFICANT CHANGE UP (ref 13–44)
MAGNESIUM SERPL-MCNC: 1.8 MG/DL — SIGNIFICANT CHANGE UP (ref 1.6–2.6)
MCHC RBC-ENTMCNC: 28.5 PG — SIGNIFICANT CHANGE UP (ref 27–34)
MCHC RBC-ENTMCNC: 30.6 GM/DL — LOW (ref 32–36)
MCV RBC AUTO: 93.2 FL — SIGNIFICANT CHANGE UP (ref 80–100)
MONOCYTES # BLD AUTO: 0.98 K/UL — HIGH (ref 0–0.9)
MONOCYTES NFR BLD AUTO: 15.5 % — HIGH (ref 2–14)
NEUTROPHILS # BLD AUTO: 3.53 K/UL — SIGNIFICANT CHANGE UP (ref 1.8–7.4)
NEUTROPHILS NFR BLD AUTO: 55.8 % — SIGNIFICANT CHANGE UP (ref 43–77)
NON HDL CHOLESTEROL: 53 MG/DL — SIGNIFICANT CHANGE UP
NRBC # BLD: 0 /100 WBCS — SIGNIFICANT CHANGE UP
NRBC # FLD: 0 K/UL — SIGNIFICANT CHANGE UP
PLATELET # BLD AUTO: 201 K/UL — SIGNIFICANT CHANGE UP (ref 150–400)
POTASSIUM SERPL-MCNC: 3.8 MMOL/L — SIGNIFICANT CHANGE UP (ref 3.5–5.3)
POTASSIUM SERPL-SCNC: 3.8 MMOL/L — SIGNIFICANT CHANGE UP (ref 3.5–5.3)
PROT SERPL-MCNC: 7.1 G/DL — SIGNIFICANT CHANGE UP (ref 6–8.3)
PROTHROM AB SERPL-ACNC: 15 SEC — HIGH (ref 10.6–13.6)
RBC # BLD: 4.42 M/UL — SIGNIFICANT CHANGE UP (ref 4.2–5.8)
RBC # FLD: 22.4 % — HIGH (ref 10.3–14.5)
SODIUM SERPL-SCNC: 141 MMOL/L — SIGNIFICANT CHANGE UP (ref 135–145)
TRIGL SERPL-MCNC: 52 MG/DL — SIGNIFICANT CHANGE UP
TSH SERPL-MCNC: 3.35 UIU/ML — SIGNIFICANT CHANGE UP (ref 0.27–4.2)
WBC # BLD: 6.32 K/UL — SIGNIFICANT CHANGE UP (ref 3.8–10.5)
WBC # FLD AUTO: 6.32 K/UL — SIGNIFICANT CHANGE UP (ref 3.8–10.5)

## 2022-01-15 PROCEDURE — 71045 X-RAY EXAM CHEST 1 VIEW: CPT | Mod: 26

## 2022-01-15 PROCEDURE — 99223 1ST HOSP IP/OBS HIGH 75: CPT

## 2022-01-15 PROCEDURE — 71250 CT THORAX DX C-: CPT | Mod: 26

## 2022-01-15 RX ORDER — ENOXAPARIN SODIUM 100 MG/ML
40 INJECTION SUBCUTANEOUS DAILY
Refills: 0 | Status: DISCONTINUED | OUTPATIENT
Start: 2022-01-15 | End: 2022-01-17

## 2022-01-15 RX ORDER — ACETAMINOPHEN 500 MG
650 TABLET ORAL EVERY 6 HOURS
Refills: 0 | Status: DISCONTINUED | OUTPATIENT
Start: 2022-01-15 | End: 2022-01-17

## 2022-01-15 RX ORDER — CHOLECALCIFEROL (VITAMIN D3) 125 MCG
2000 CAPSULE ORAL DAILY
Refills: 0 | Status: DISCONTINUED | OUTPATIENT
Start: 2022-01-15 | End: 2022-01-17

## 2022-01-15 RX ORDER — PREGABALIN 225 MG/1
1000 CAPSULE ORAL DAILY
Refills: 0 | Status: DISCONTINUED | OUTPATIENT
Start: 2022-01-15 | End: 2022-01-17

## 2022-01-15 RX ORDER — TIOTROPIUM BROMIDE 18 UG/1
1 CAPSULE ORAL; RESPIRATORY (INHALATION) DAILY
Refills: 0 | Status: DISCONTINUED | OUTPATIENT
Start: 2022-01-15 | End: 2022-01-17

## 2022-01-15 RX ORDER — BUDESONIDE AND FORMOTEROL FUMARATE DIHYDRATE 160; 4.5 UG/1; UG/1
2 AEROSOL RESPIRATORY (INHALATION)
Refills: 0 | Status: DISCONTINUED | OUTPATIENT
Start: 2022-01-15 | End: 2022-01-17

## 2022-01-15 RX ORDER — COLESEVELAM HYDROCHLORIDE 625 MG/1
1 TABLET, FILM COATED ORAL
Qty: 0 | Refills: 0 | DISCHARGE

## 2022-01-15 RX ORDER — ALBUTEROL 90 UG/1
2 AEROSOL, METERED ORAL EVERY 6 HOURS
Refills: 0 | Status: DISCONTINUED | OUTPATIENT
Start: 2022-01-15 | End: 2022-01-17

## 2022-01-15 RX ADMIN — TIOTROPIUM BROMIDE 1 CAPSULE(S): 18 CAPSULE ORAL; RESPIRATORY (INHALATION) at 13:07

## 2022-01-15 RX ADMIN — BUDESONIDE AND FORMOTEROL FUMARATE DIHYDRATE 2 PUFF(S): 160; 4.5 AEROSOL RESPIRATORY (INHALATION) at 22:06

## 2022-01-15 RX ADMIN — ENOXAPARIN SODIUM 40 MILLIGRAM(S): 100 INJECTION SUBCUTANEOUS at 13:06

## 2022-01-15 RX ADMIN — PREGABALIN 1000 MICROGRAM(S): 225 CAPSULE ORAL at 13:06

## 2022-01-15 RX ADMIN — Medication 2000 UNIT(S): at 13:22

## 2022-01-15 NOTE — CONSULT NOTE ADULT - ATTENDING COMMENTS
Patient seen and examined agree with above note as modified, where appropriate, by me. Severe emphysema and small left PTX. High risk for intervention, given small size of ptx will observe for now.

## 2022-01-15 NOTE — ED ADULT NURSE REASSESSMENT NOTE - NS ED NURSE REASSESS COMMENT FT1
pt. offers no new complaints at this time. no acute distress noted. 200 mL of clear yellow urine noted in the urinal. respirations even and unlabored. pending thoracic consult.
Report received. Pt a&ox4, breathing even and unlabored on supplemental 02, VSS. Pt denies any trouble breathing, cp at this time. Pt to be transported to [facility] at this time.
pt. remains A&Ox4, awake and resting. pt. offers no new complaint at this time. pt. in no acute distress. pt. endorses improvement in SOB. VS as noted. respirations even and unlabored. FS as noted. pending dispo.

## 2022-01-15 NOTE — H&P ADULT - ATTENDING COMMENTS
82 yo M w/ hx COPD on home O2 4 L NC, prior Pneumothorax Rt requiring chest tube1/20, bladder Ca s/p chemo, PHTN (moderate PHTN on TTE 11/21) p/w fatigue and progressive SOB requiring increased oxygen to 5/6 L. denies cough/SOB/N/V/ swelling. PE CTA b/l no wheezing; Labs reviewed. CXR with unchanged b/l patchy opacity small LT pneumothorax.     Acute on chronic hypoxic respiratory failure- secondary to new LT pneumothorax                                     - repeat CXR stable                                    - currently sating well at 3 L %                                    - cont albuterol nebs PRN/ restart home advair/LAMA on incruso ellipta ( cont spiriva)                                    - CT chest                                     - F/u CT surgery recs post CT chest 80 yo M w/ hx COPD on home O2 4 L NC, prior Pneumothorax Rt requiring chest tube1/20, bladder Ca s/p chemo, PHTN (moderate PHTN on TTE 11/21) p/w fatigue and progressive SOB and sats 89-90% requiring increased oxygen to 5/6 L. denies cough/SOB/N/V/ swelling. PE CTA b/l no wheezing; Labs reviewed. EKG NSR 91 BPM no acute ST/T wave changes. CXR with unchanged b/l patchy opacity small LT pneumothorax.     Acute on chronic hypoxic respiratory failure- secondary to new LT pneumothorax                                     - repeat CXR stable                                    - currently sating well at 3 L %                                    - cont albuterol nebs PRN/ restart home advair/LAMA on incruso ellipta ( cont spiriva)                                    - CT chest                                     - F/u CT surgery recs post CT chest

## 2022-01-15 NOTE — CONSULT NOTE ADULT - SUBJECTIVE AND OBJECTIVE BOX
HPI: 81 year old male with history of COPD, on home oxygen baseline 4L, left lung nodule, pulm htn, bladder cancer presents to ER feeling shortness of breath, weakness and increased oxygen requirement. Patient with history of right pneumothorax found incidentally on CT scan while following up lung nodule requiring pigtail cath placement.  Patient states that since Tuesday he has been feeling short of breath, weak and requiring more oxygen 5-6L.  CXR in ER showing small left pneumothorax.  Patient currently on 4l nasal cannula, oxygen sats in the high 90s.  Patient not in any distress, denies chest pain.        PAST MEDICAL & SURGICAL HISTORY:  Hypertension  on no med, monitored by MD    Emphysema of lung  does not use inhalers as rx&#x27;d    Nodule of left lung  had CT and biopsy 2019  inconclusive  Right Ptx 2020 requiring pigtail cath     Vitamin D deficiency    S/P laparoscopic cholecystectomy    S/P left inguinal hernia repair      S/P cataract surgery  right  eye  2019        REVIEW OF SYSTEMS      General: weak    Skin/Breast: No Rashes/ Lesions/ Masses  	  Ophthalmologic: No Blurry vision/ Glaucoma/ Blindness  	  ENMT: No Hearing loss/ Drainage/ Lesions	    Respiratory and Thorax: +SOB, hypoxia   	  Cardiovascular: No Chest pain/ Palpitations/ Diaphoresis	    Gastrointestinal: No Nausea/ Vomiting/ Constipation/ Appetite Change	    Genitourinary: No Heamturia/ Dysuria/ Frequency change/ Impotence	    Musculoskeletal: No Pain/ Weakness/ Claudication	    Neurological: No Seizures/ TIA/CVA/ Parastesias	    Psychiatric: No Dementia/ Depression/ SI/HI	    Hematology/Lymphatics: No hx of bleeding/ Edema	    Endocrine:	No Hyperglycemia/ Hypoglycemia    Allergic/Immunologic:	 No Anaphylaxis/ Intolerance/ Recent illnesses    MEDICATIONS  (STANDING):    MEDICATIONS  (PRN):      Allergies    allergic to cefobid, unknown reaction (Other)  penicillin (Rash)    Intolerances        SOCIAL HISTORY:    FAMILY HISTORY:  Family hx of hypertension    FH: diabetes mellitus        Vital Signs Last 24 Hrs  T(C): 36.7 (2022 20:58), Max: 36.8 (2022 18:49)  T(F): 98.1 (2022 20:58), Max: 98.3 (2022 18:49)  HR: 82 (2022 20:58) (82 - 94)  BP: 143/82 (2022 20:58) (143/82 - 150/82)  BP(mean): --  RR: 18 (2022 20:58) (18 - 18)  SpO2: 98% (2022 20:58) (97% - 98%)    General: WN/WD NAD  Neurology: Awake, nonfocal, GALLEGOS x 4  Eyes: Scleras clear, PERRLA/ EOMI, Gross vision intact  ENT:Gross hearing intact, grossly patent pharynx, no stridor  Neck: Neck supple, trachea midline, No JVD,   Respiratory: decreased left side   CV: RRR, S1S2, no murmurs, rubs or gallops  Abdominal: Soft, NT, ND +BS,   Extremities: No edema, + peripheral pulses  Skin: No Rashes, Hematoma, Ecchymosis  Lymphatic: No Neck, axilla, groin LAD  Psych: Oriented x 3, normal affect  LABS:                        13.1   6.75  )-----------( 226      ( 2022 22:31 )             44.2     -    143  |  104  |  12  ----------------------------<  82  4.7   |  30  |  0.93    Ca    9.3      2022 22:31  Phos  3.2     -  Mg     1.90     -    TPro  7.7  /  Alb  3.6  /  TBili  0.5  /  DBili  x   /  AST  13  /  ALT  9   /  AlkPhos  59  -14      Urinalysis Basic - ( 2022 22:31 )    Color: Light Yellow / Appearance: Clear / S.015 / pH: x  Gluc: x / Ketone: Negative  / Bili: Negative / Urobili: <2 mg/dL   Blood: x / Protein: 30 mg/dL / Nitrite: Negative   Leuk Esterase: Negative / RBC: 4 /HPF / WBC 9 /HPF   Sq Epi: x / Non Sq Epi: 2 /HPF / Bacteria: Negative        RADIOLOGY & ADDITIONAL STUDIES:  CXR: Small left pneumothorax     ASSESSMENT:   81yMalePAST MEDICAL & SURGICAL HISTORY:  Hypertension  on no med, monitored by MD    Emphysema of lung  does not use inhalers as rx&#x27;d    Nodule of left lung  monitored by MD , had CT and biopsy 2019  inconclusive,  will have next CT 2020    Vitamin D deficiency    S/P laparoscopic cholecystectomy    S/P left inguinal hernia repair      S/P cataract surgery  right  eye  2019    HEALTH ISSUES - PROBLEM Dx:  Small left pneumothorax       PLAN:  Medical management   Repeat chest x-ray in 6 hours   Further recommendation based on Am chest x-ray  Above discussed with Dr. Granados

## 2022-01-15 NOTE — H&P ADULT - HISTORY OF PRESENT ILLNESS
82 y/o male, with a PmHx of COPD on Home O2 @ 4L NC, Pneumothorax, Vit D deficiency, pHTN (not on any meds), Bladder Ca (last chemo 12/21), presented to the [facility] ED with SOB and weakness. Pt states since this past Monday he has been feeling weak and started to get a little more SOB. He states he has a home pulse ox to monitor his saturation being that he is on home oxygen and over the past few days he states he has been "dipping down" He states he usually runs about 94% but the past few days it has been going down to the upper 80s so last night he decided to come to the [facility] ED for an evaluation. Denies any HA, dizziness, blurred vision, abd pain, chest pain, n/v, sick contacts, recent travel. In the [facility] ED, he had a CXR done that showed a new small stable left pneumothorax. He was seen by CTS and they recommended medically management. He appears comfortable at this time and he states he is feeling much better. He is now being admitted to medicine for monitoring.

## 2022-01-15 NOTE — H&P ADULT - NSHPPHYSICALEXAM_GEN_ALL_CORE
Vital Signs Last 24 Hrs  T(C): 36.5 (15 Robert 2022 07:50), Max: 36.8 (14 Jan 2022 18:49)  T(F): 97.7 (15 Robert 2022 07:50), Max: 98.3 (14 Jan 2022 18:49)  HR: 87 (15 Robert 2022 07:50) (82 - 94)  BP: 128/68 (15 Robert 2022 07:50) (128/68 - 150/82)  BP(mean): --  RR: 18 (15 Robert 2022 07:50) (18 - 18)  SpO2: 100% (15 Robert 2022 07:50) (97% - 100%)    EKG: NSR @ 91, no changes

## 2022-01-15 NOTE — H&P ADULT - NSICDXPASTMEDICALHX_GEN_ALL_CORE_FT
PAST MEDICAL HISTORY:  Bladder cancer last chemo 12/21    Emphysema of lung does not use inhalers as rx'd    Hypertension on no med, monitored by MD    Nodule of left lung monitored by MD , had CT and biopsy 7/2019  inconclusive,  will have next CT 1/2020    Vitamin D deficiency

## 2022-01-15 NOTE — H&P ADULT - RS GEN PE MLT RESP DETAILS PC
airway patent/good air movement/respirations non-labored/clear to auscultation bilaterally/diminished breath sounds, L

## 2022-01-15 NOTE — H&P ADULT - NSHPSOCIALHISTORY_GEN_ALL_CORE
Marital Status:     Occupation: Retired     Tobacco Use: d/c smoking 12/2021 - used to smoke about 2 pks per week x 40 years    ETOH Use: used to drink on the weekends but last drink was about 1 year ago    Drug Use: cristina    Flu Vaccine:    12/2021                        Pneumonia Vaccine: denies                                COVID Vaccine: Completed Pfizer vaccine & had booster 12/2021

## 2022-01-15 NOTE — CONSULT NOTE ADULT - SUBJECTIVE AND OBJECTIVE BOX
CHIEF COMPLAINT  Dyspnea    HISTORY OF PRESENT ILLNESS  SHERYL DELONG is a 81y Male who presents with a chief complaint of dyspnea.    Patient was admitted on January 15th after presenting with several days' duration of progressive weakness and dyspnea. He also noted more hypoxia on his 4L home oxygen. In the Emergency Department he was noted to have a small left pneumothorax and was admitted for further evaluation and management.    Patient follows with Dr. Fabricio Willingham for hematology and oncology. 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 He was deemed not to be a candidate for cystectomy, and was started on carboplatin + gemcitabine. He completed 6 cycles of therapy with stable disease. Plan was to switch to maintenance avelumab if pleural growth biopsy was negative.     PAST MEDICAL AND SURGICAL HISTORY  Bladder Cancer  Emphysema with known lung masses  Venous Thromboembolism    FAMILY HISTORY  Non-contributory    SOCIAL HISTORY  Denies tobacco use    REVIEW OF SYSTEMS  A complete review of systems was performed; negative except per HPI    PHYSICAL EXAM  T(C): 36.3 (01-15-22 @ 09:33), Max: 36.8 (01-14-22 @ 18:49)  HR: 68 (01-15-22 @ 09:33) (68 - 94)  BP: 128/62 (01-15-22 @ 09:33) (128/62 - 150/82)  RR: 18 (01-15-22 @ 09:33) (18 - 18)  SpO2: 100% (01-15-22 @ 09:33) (97% - 100%)  Constitutional: alert, awake, in no acute distress  Eyes: PERRL, EOMI  HEENT: normocephalic, atraumatic  Neck: supple, non-tender  Cardiovascular: normal perfusion, no peripheral edema  Respiratory: normal respiratory efforts; no increased use of accessory muscles  Gastrointestinal: soft, non-tender  Musculoskeletal: normal range of motion, no deformities noted  Neurological: alert, CN II to XI grossly intact  Skin: warm, dry    LABORATORY DATA                        12.6   6.32  )-----------( 201      ( 15 Robert 2022 08:30 )             41.2     01-15    141  |  102  |  10  ----------------------------<  78  3.8   |  29  |  0.83    Ca    8.7      15 Robert 2022 08:30  Phos  3.2     01-14  Mg     1.80     01-15    TPro  7.1  /  Alb  3.2<L>  /  TBili  0.5  /  DBili  x   /  AST  15  /  ALT  7   /  AlkPhos  53  01-15    RADIOLOGY REVIEW  CT Chest  IMPRESSION:    Small to moderate left pneumothorax.    New medial left lower lobe consolidation of uncertain etiology.    Right upper lobe mass has increased in size.    Additional bilateral nodular opacities and masses are similar to prior CT   chest 11/15/2021.

## 2022-01-15 NOTE — H&P ADULT - PROBLEM SELECTOR PLAN 1
EKG: NSR @ 91, no changes  hsTrop: 24          COVID neg          Lactate: 2.7           BNP: 968        UA neg  1/14 CXR - new small left pneumothorax  1/15 Repeat CXR - stable small left pneumothorax  - CTS c/s following - nothing to do  - CT Chest non-contrast ordered

## 2022-01-15 NOTE — H&P ADULT - NSICDXFAMILYHX_GEN_ALL_CORE_FT
FAMILY HISTORY:  Family hx of hypertension, Mother  FH: diabetes mellitus, Mother  FHx: congestive heart failure, Brother
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
## 2022-01-15 NOTE — PATIENT PROFILE ADULT - FALL HARM RISK - HARM RISK INTERVENTIONS

## 2022-01-15 NOTE — CONSULT NOTE ADULT - ASSESSMENT
SHERYL DELONG is a 81y Male who presents with a chief complaint of dyspnea.    Bladder Cancer  - Patient has completed 6 cycles of carboplatin + gemcitabine. He is not on active treatment at this time.  - He was supposed to have pleural growth/lung mass biopsied outpatient.  - On discharge, to follow-up with Dr. Willingham.    Hypoxic Respiratory Failure  - Patient with known severe emphysema with multiple lung nodular opacities and masses with previous biopsy being negative.  - CT imaging with small to moderate left pneumothorax.  - Thoracic surgery consulted. Pending repeat imaging evaluation.  - Continue to monitor oxygen status in the interim.    We will continue to follow.    Leonel Rucker MD  Hematology/Oncology  C: [phone]  O: [phone]

## 2022-01-15 NOTE — CHART NOTE - NSCHARTNOTEFT_GEN_A_CORE
pt seen in ED, denies SOB, sat on 4l nc.     ICU Vital Signs Last 24 Hrs  T(C): 36.3 (15 Robert 2022 09:33), Max: 36.8 (14 Jan 2022 18:49)  T(F): 97.3 (15 Robert 2022 09:33), Max: 98.3 (14 Jan 2022 18:49)  HR: 68 (15 Robert 2022 09:33) (68 - 94)  BP: 128/62 (15 Robert 2022 09:33) (128/62 - 150/82)  BP(mean): --  ABP: --  ABP(mean): --  RR: 18 (15 Robert 2022 09:33) (18 - 18)  SpO2: 100% (15 Robert 2022 09:33) (97% - 100%)      CT scan reviewed w/ Dr Granados. Severe bilat emphysemtous bullous disease and small to mod left ptx. Recommend admit to medicine for monitoring resp status. Repeat cxr in am or sooner if clinically warranted. will follow

## 2022-01-15 NOTE — H&P ADULT - ASSESSMENT
82 y/o male, with a PmHx of COPD on Home O2 @ 4L NC, Pneumothorax, Vit D deficiency, pHTN (not on any meds), Bladder Ca (last chemo 12/21), presented to the [facility] ED with SOB and weakness. Admitted to medicine for a stable small left pneumothorax.

## 2022-01-16 LAB
ANION GAP SERPL CALC-SCNC: 12 MMOL/L — SIGNIFICANT CHANGE UP (ref 7–14)
BASOPHILS # BLD AUTO: 0.03 K/UL — SIGNIFICANT CHANGE UP (ref 0–0.2)
BASOPHILS NFR BLD AUTO: 0.5 % — SIGNIFICANT CHANGE UP (ref 0–2)
BUN SERPL-MCNC: 7 MG/DL — SIGNIFICANT CHANGE UP (ref 7–23)
CALCIUM SERPL-MCNC: 9.5 MG/DL — SIGNIFICANT CHANGE UP (ref 8.4–10.5)
CHLORIDE SERPL-SCNC: 102 MMOL/L — SIGNIFICANT CHANGE UP (ref 98–107)
CO2 SERPL-SCNC: 29 MMOL/L — SIGNIFICANT CHANGE UP (ref 22–31)
CREAT SERPL-MCNC: 0.91 MG/DL — SIGNIFICANT CHANGE UP (ref 0.5–1.3)
EOSINOPHIL # BLD AUTO: 0.1 K/UL — SIGNIFICANT CHANGE UP (ref 0–0.5)
EOSINOPHIL NFR BLD AUTO: 1.8 % — SIGNIFICANT CHANGE UP (ref 0–6)
GLUCOSE BLDC GLUCOMTR-MCNC: 190 MG/DL — HIGH (ref 70–99)
GLUCOSE SERPL-MCNC: 59 MG/DL — LOW (ref 70–99)
HCT VFR BLD CALC: 48.9 % — SIGNIFICANT CHANGE UP (ref 39–50)
HGB BLD-MCNC: 14.1 G/DL — SIGNIFICANT CHANGE UP (ref 13–17)
IANC: 3 K/UL — SIGNIFICANT CHANGE UP (ref 1.5–8.5)
IMM GRANULOCYTES NFR BLD AUTO: 0.2 % — SIGNIFICANT CHANGE UP (ref 0–1.5)
LYMPHOCYTES # BLD AUTO: 1.54 K/UL — SIGNIFICANT CHANGE UP (ref 1–3.3)
LYMPHOCYTES # BLD AUTO: 28.2 % — SIGNIFICANT CHANGE UP (ref 13–44)
MCHC RBC-ENTMCNC: 28.8 GM/DL — LOW (ref 32–36)
MCHC RBC-ENTMCNC: 28.8 PG — SIGNIFICANT CHANGE UP (ref 27–34)
MCV RBC AUTO: 100 FL — SIGNIFICANT CHANGE UP (ref 80–100)
MONOCYTES # BLD AUTO: 0.79 K/UL — SIGNIFICANT CHANGE UP (ref 0–0.9)
MONOCYTES NFR BLD AUTO: 14.4 % — HIGH (ref 2–14)
NEUTROPHILS # BLD AUTO: 3 K/UL — SIGNIFICANT CHANGE UP (ref 1.8–7.4)
NEUTROPHILS NFR BLD AUTO: 54.9 % — SIGNIFICANT CHANGE UP (ref 43–77)
NRBC # BLD: 0 /100 WBCS — SIGNIFICANT CHANGE UP
NRBC # FLD: 0 K/UL — SIGNIFICANT CHANGE UP
PLATELET # BLD AUTO: 208 K/UL — SIGNIFICANT CHANGE UP (ref 150–400)
POTASSIUM SERPL-MCNC: 3.9 MMOL/L — SIGNIFICANT CHANGE UP (ref 3.5–5.3)
POTASSIUM SERPL-SCNC: 3.9 MMOL/L — SIGNIFICANT CHANGE UP (ref 3.5–5.3)
RBC # BLD: 4.89 M/UL — SIGNIFICANT CHANGE UP (ref 4.2–5.8)
RBC # FLD: 23.3 % — HIGH (ref 10.3–14.5)
SODIUM SERPL-SCNC: 143 MMOL/L — SIGNIFICANT CHANGE UP (ref 135–145)
WBC # BLD: 5.47 K/UL — SIGNIFICANT CHANGE UP (ref 3.8–10.5)
WBC # FLD AUTO: 5.47 K/UL — SIGNIFICANT CHANGE UP (ref 3.8–10.5)

## 2022-01-16 PROCEDURE — 99232 SBSQ HOSP IP/OBS MODERATE 35: CPT

## 2022-01-16 PROCEDURE — 71045 X-RAY EXAM CHEST 1 VIEW: CPT | Mod: 26

## 2022-01-16 PROCEDURE — 99233 SBSQ HOSP IP/OBS HIGH 50: CPT

## 2022-01-16 RX ADMIN — PREGABALIN 1000 MICROGRAM(S): 225 CAPSULE ORAL at 12:36

## 2022-01-16 RX ADMIN — TIOTROPIUM BROMIDE 1 CAPSULE(S): 18 CAPSULE ORAL; RESPIRATORY (INHALATION) at 10:28

## 2022-01-16 RX ADMIN — ENOXAPARIN SODIUM 40 MILLIGRAM(S): 100 INJECTION SUBCUTANEOUS at 12:36

## 2022-01-16 RX ADMIN — Medication 2000 UNIT(S): at 12:35

## 2022-01-16 RX ADMIN — BUDESONIDE AND FORMOTEROL FUMARATE DIHYDRATE 2 PUFF(S): 160; 4.5 AEROSOL RESPIRATORY (INHALATION) at 10:29

## 2022-01-16 RX ADMIN — BUDESONIDE AND FORMOTEROL FUMARATE DIHYDRATE 2 PUFF(S): 160; 4.5 AEROSOL RESPIRATORY (INHALATION) at 21:17

## 2022-01-17 ENCOUNTER — TRANSCRIPTION ENCOUNTER (OUTPATIENT)
Age: 82
End: 2022-01-17

## 2022-01-17 VITALS
OXYGEN SATURATION: 96 % | SYSTOLIC BLOOD PRESSURE: 124 MMHG | TEMPERATURE: 98 F | RESPIRATION RATE: 17 BRPM | HEART RATE: 85 BPM | DIASTOLIC BLOOD PRESSURE: 62 MMHG

## 2022-01-17 LAB
ANION GAP SERPL CALC-SCNC: 10 MMOL/L — SIGNIFICANT CHANGE UP (ref 7–14)
APTT BLD: 36 SEC — SIGNIFICANT CHANGE UP (ref 27–36.3)
BLD GP AB SCN SERPL QL: NEGATIVE — SIGNIFICANT CHANGE UP
BUN SERPL-MCNC: 9 MG/DL — SIGNIFICANT CHANGE UP (ref 7–23)
CALCIUM SERPL-MCNC: 9 MG/DL — SIGNIFICANT CHANGE UP (ref 8.4–10.5)
CHLORIDE SERPL-SCNC: 101 MMOL/L — SIGNIFICANT CHANGE UP (ref 98–107)
CO2 SERPL-SCNC: 30 MMOL/L — SIGNIFICANT CHANGE UP (ref 22–31)
CREAT SERPL-MCNC: 0.93 MG/DL — SIGNIFICANT CHANGE UP (ref 0.5–1.3)
GLUCOSE BLDC GLUCOMTR-MCNC: 80 MG/DL — SIGNIFICANT CHANGE UP (ref 70–99)
GLUCOSE SERPL-MCNC: 64 MG/DL — LOW (ref 70–99)
HCT VFR BLD CALC: 44.5 % — SIGNIFICANT CHANGE UP (ref 39–50)
HGB BLD-MCNC: 13.3 G/DL — SIGNIFICANT CHANGE UP (ref 13–17)
INR BLD: 1.29 RATIO — HIGH (ref 0.88–1.16)
MAGNESIUM SERPL-MCNC: 1.8 MG/DL — SIGNIFICANT CHANGE UP (ref 1.6–2.6)
MCHC RBC-ENTMCNC: 28.6 PG — SIGNIFICANT CHANGE UP (ref 27–34)
MCHC RBC-ENTMCNC: 29.9 GM/DL — LOW (ref 32–36)
MCV RBC AUTO: 95.7 FL — SIGNIFICANT CHANGE UP (ref 80–100)
NRBC # BLD: 0 /100 WBCS — SIGNIFICANT CHANGE UP
NRBC # FLD: 0 K/UL — SIGNIFICANT CHANGE UP
PHOSPHATE SERPL-MCNC: 3.3 MG/DL — SIGNIFICANT CHANGE UP (ref 2.5–4.5)
PLATELET # BLD AUTO: 203 K/UL — SIGNIFICANT CHANGE UP (ref 150–400)
POTASSIUM SERPL-MCNC: 4.1 MMOL/L — SIGNIFICANT CHANGE UP (ref 3.5–5.3)
POTASSIUM SERPL-SCNC: 4.1 MMOL/L — SIGNIFICANT CHANGE UP (ref 3.5–5.3)
PROTHROM AB SERPL-ACNC: 14.7 SEC — HIGH (ref 10.6–13.6)
RBC # BLD: 4.65 M/UL — SIGNIFICANT CHANGE UP (ref 4.2–5.8)
RBC # FLD: 22.5 % — HIGH (ref 10.3–14.5)
RH IG SCN BLD-IMP: POSITIVE — SIGNIFICANT CHANGE UP
SODIUM SERPL-SCNC: 141 MMOL/L — SIGNIFICANT CHANGE UP (ref 135–145)
WBC # BLD: 6.9 K/UL — SIGNIFICANT CHANGE UP (ref 3.8–10.5)
WBC # FLD AUTO: 6.9 K/UL — SIGNIFICANT CHANGE UP (ref 3.8–10.5)

## 2022-01-17 PROCEDURE — 99239 HOSP IP/OBS DSCHRG MGMT >30: CPT

## 2022-01-17 PROCEDURE — 71045 X-RAY EXAM CHEST 1 VIEW: CPT | Mod: 26

## 2022-01-17 PROCEDURE — 99231 SBSQ HOSP IP/OBS SF/LOW 25: CPT

## 2022-01-17 RX ORDER — IPRATROPIUM/ALBUTEROL SULFATE 18-103MCG
3 AEROSOL WITH ADAPTER (GRAM) INHALATION
Qty: 360 | Refills: 0
Start: 2022-01-17 | End: 2022-02-15

## 2022-01-17 RX ORDER — FLUTICASONE PROPIONATE AND SALMETEROL 50; 250 UG/1; UG/1
1 POWDER ORAL; RESPIRATORY (INHALATION)
Qty: 60 | Refills: 0
Start: 2022-01-17 | End: 2022-02-15

## 2022-01-17 RX ORDER — FLUTICASONE PROPIONATE AND SALMETEROL 50; 250 UG/1; UG/1
1 POWDER ORAL; RESPIRATORY (INHALATION)
Qty: 0 | Refills: 0 | DISCHARGE

## 2022-01-17 RX ORDER — UMECLIDINIUM 62.5 UG/1
1 AEROSOL, POWDER ORAL
Qty: 0 | Refills: 0 | DISCHARGE

## 2022-01-17 RX ORDER — UMECLIDINIUM 62.5 UG/1
1 AEROSOL, POWDER ORAL
Qty: 30 | Refills: 0
Start: 2022-01-17 | End: 2022-02-15

## 2022-01-17 RX ADMIN — Medication 2000 UNIT(S): at 12:42

## 2022-01-17 RX ADMIN — TIOTROPIUM BROMIDE 1 CAPSULE(S): 18 CAPSULE ORAL; RESPIRATORY (INHALATION) at 12:42

## 2022-01-17 NOTE — PROGRESS NOTE ADULT - SUBJECTIVE AND OBJECTIVE BOX
Dre Brock, Internal Medicine  Pager, [phone], [phone]    PROGRESS NOTE:     Patient is a 81y old  Male who presents with a chief complaint of Weakness/SOB (17 Jan 2022 10:47)      SUBJECTIVE / OVERNIGHT EVENTS: NO acute overnight events. Pt states that his breathing has improved to baseline. Denies chest pain, palpitations, light headedness, dizziness, fever, or chills        MEDICATIONS  (STANDING):  budesonide  80 MICROgram(s)/formoterol 4.5 MICROgram(s) Inhaler 2 Puff(s) Inhalation two times a day  cholecalciferol 2000 Unit(s) Oral daily  cyanocobalamin 1000 MICROGram(s) Oral daily  enoxaparin Injectable 40 milliGRAM(s) SubCutaneous daily  tiotropium 18 MICROgram(s) Capsule 1 Capsule(s) Inhalation daily    MEDICATIONS  (PRN):  acetaminophen     Tablet .. 650 milliGRAM(s) Oral every 6 hours PRN Temp greater or equal to 38C (100.4F), Mild Pain (1 - 3), Moderate Pain (4 - 6)  ALBUTerol    90 MICROgram(s) HFA Inhaler 2 Puff(s) Inhalation every 6 hours PRN Shortness of Breath and/or Wheezing      CAPILLARY BLOOD GLUCOSE        I&O's Summary      PHYSICAL EXAM:  Vital Signs Last 24 Hrs  T(C): 36.6 (17 Jan 2022 12:35), Max: 36.7 (16 Jan 2022 21:51)  T(F): 97.9 (17 Jan 2022 12:35), Max: 98.1 (16 Jan 2022 21:51)  HR: 85 (17 Jan 2022 12:35) (81 - 85)  BP: 124/62 (17 Jan 2022 12:35) (112/66 - 124/62)  BP(mean): --  RR: 17 (17 Jan 2022 12:35) (17 - 17)  SpO2: 96% (17 Jan 2022 12:35) (96% - 96%)    CONSTITUTIONAL: Well-groomed, in no apparent distress  EYES: No conjunctival or scleral injection, non-icteric; PERRLA and symmetric  ENMT: No external nasal lesions; nasal mucosa not inflamed; normal dentition; no pharyngeal injection or exudates, oral mucosa with moist membranes  NECK: Trachea midline without palpable neck mass; thyroid not enlarged and non-tender  RESPIRATORY: Breathing comfortably; no dullness to percussion; lungs CTA with decreased on the left slightly. without wheeze/rhonchi/rales  CARDIOVASCULAR: +S1S2, RRR, no M/G/R; no carotid bruits; pedal pulses full and symmetric; no lower extremity edema  GASTROINTESTINAL: No palpable masses or tenderness, +BS throughout, no rebound/guarding; no hepatosplenomegaly; no hernia palpated  LYMPHATIC: No cervical LAD or tenderness;  MUSCULOSKELETAL:  normal strength and tone of extremities  SKIN: No rashes or ulcers noted; no subcutaneous nodules or induration palpable  NEUROLOGIC: CN II-XII intact; normal reflexes in upper and lower extremities; sensation intact in LEs b/l to light touch  PSYCHIATRIC: A+O x 3; mood and affect appropriate; appropriate insight and judgment    LABS:                        13.3   6.90  )-----------( 203      ( 17 Jan 2022 07:08 )             44.5     01-17    141  |  101  |  9   ----------------------------<  64<L>  4.1   |  30  |  0.93    Ca    9.0      17 Jan 2022 07:08  Phos  3.3     01-17  Mg     1.80     01-17      PT/INR - ( 17 Jan 2022 07:08 )   PT: 14.7 sec;   INR: 1.29 ratio         PTT - ( 17 Jan 2022 07:08 )  PTT:36.0 sec            RADIOLOGY & ADDITIONAL TESTS:  Results Reviewed:   Imaging Personally Reviewed:  Electrocardiogram Personally Reviewed:    COORDINATION OF CARE:  Care Discussed with Consultants/Other Providers [Y/N]:  Prior or Outpatient Records Reviewed [Y/N]:  
Subjective: 80 y/o male seen at bedside with Dr. Granados. No issues overnight. CXR reviewed.        PMH:   80 y/o male, with a PmHx of COPD on Home O2 @ 4L NC, Pneumothorax, Vit D deficiency, pHTN (not on any meds), Bladder Ca (last chemo 12/21), presented to the [facility] ED with SOB and weakness. Pt states since this past Monday he has been feeling weak and started to get a little more SOB. He states he has a home pulse ox to monitor his saturation being that he is on home oxygen and over the past few days he states he has been "dipping down" He states he usually runs about 94% but the past few days it has been going down to the upper 80s so last night he decided to come to the [facility] ED for an evaluation. Denies any HA, dizziness, blurred vision, abd pain, chest pain, n/v, sick contacts, recent travel. In the [facility] ED, he had a CXR done that showed a new small stable left pneumothorax. He was seen by CTS and they recommended medically management. He appears comfortable at this time and he states he is feeling much better. He is now being admitted to medicine for monitoring.      Vital Signs:  Vital Signs Last 24 Hrs  T(C): 36.8 (01-16-22 @ 04:51), Max: 36.8 (01-16-22 @ 04:51)  T(F): 98.2 (01-16-22 @ 04:51), Max: 98.2 (01-16-22 @ 04:51)  HR: 85 (01-16-22 @ 04:51) (79 - 85)  BP: 128/78 (01-16-22 @ 04:51) (128/78 - 143/78)  RR: 17 (01-16-22 @ 04:51) (17 - 17)  SpO2: 95% (01-16-22 @ 04:51) (95% - 99%) on (O2)    A/P:   80 yo M w/ hx COPD on home O2 4 L NC, prior Pneumothorax Rt requiring chest tube1/20, bladder Ca s/p chemo, PHTN (moderate PHTN on TTE 11/21) p/w fatigue and progressive SOB and sats 89-90% requiring increased oxygen to 5/6 L. denies cough/SOB/N/V/ swelling. PE CTA b/l no wheezing; Labs reviewed. EKG NSR 91 BPM no acute ST/T wave changes. CXR with unchanged b/l patchy opacity small LT pneumothorax.       Pt with severe emphysema and small left PTX  High risk for intervention  Observation for now  CXR's as needed    Thoracic, [id number]
pt seen in bed, without complaints, denies sob    Vital Signs Last 24 Hrs  T(C): 36.4 (17 Jan 2022 06:00), Max: 36.7 (16 Jan 2022 21:51)  T(F): 97.5 (17 Jan 2022 06:00), Max: 98.1 (16 Jan 2022 21:51)  HR: 81 (17 Jan 2022 06:00) (81 - 84)  BP: 112/66 (17 Jan 2022 06:00) (112/66 - 128/74)  BP(mean): --  RR: 17 (17 Jan 2022 06:00) (17 - 17)  SpO2: 96% (17 Jan 2022 06:00) (96% - 99%)    MEDICATIONS  (STANDING):  budesonide  80 MICROgram(s)/formoterol 4.5 MICROgram(s) Inhaler 2 Puff(s) Inhalation two times a day  cholecalciferol 2000 Unit(s) Oral daily  cyanocobalamin 1000 MICROGram(s) Oral daily  enoxaparin Injectable 40 milliGRAM(s) SubCutaneous daily  tiotropium 18 MICROgram(s) Capsule 1 Capsule(s) Inhalation daily    MEDICATIONS  (PRN):  acetaminophen     Tablet .. 650 milliGRAM(s) Oral every 6 hours PRN Temp greater or equal to 38C (100.4F), Mild Pain (1 - 3), Moderate Pain (4 - 6)  ALBUTerol    90 MICROgram(s) HFA Inhaler 2 Puff(s) Inhalation every 6 hours PRN Shortness of Breath and/or Wheezing      am CXR shows resolving left basilar ptx    General: WN/WD NAD  Neurology: A&Ox3, nonfocal, GALLEGOS x 4  Eyes: PERRLA/ EOMI, Gross vision intact  ENT/Neck: Neck supple, trachea midline, No JVD, Gross hearing intact  Respiratory: CTA B/L, No wheezing, rales, rhonchi  CV: RRR, S1S2, no murmurs, rubs or gallops  Abdominal: Soft, NT, ND +BS,   Extremities: No edema, + peripheral pulses  Skin: No Rashes, Hematoma, Ecchymosis  
Dre Brock, Internal Medicine  Pager, [phone], [phone]    PROGRESS NOTE:     Patient is a 81y old  Male who presents with a chief complaint of Weakness/SOB (2022 11:52)      SUBJECTIVE / OVERNIGHT EVENTS:No acute overnight events. Pt states that his breathing is improving. Denies chest pain, light headedness, dizziness, fever, or chills.    ADDITIONAL REVIEW OF SYSTEMS:    MEDICATIONS  (STANDING):  budesonide  80 MICROgram(s)/formoterol 4.5 MICROgram(s) Inhaler 2 Puff(s) Inhalation two times a day  cholecalciferol 2000 Unit(s) Oral daily  cyanocobalamin 1000 MICROGram(s) Oral daily  enoxaparin Injectable 40 milliGRAM(s) SubCutaneous daily  tiotropium 18 MICROgram(s) Capsule 1 Capsule(s) Inhalation daily    MEDICATIONS  (PRN):  acetaminophen     Tablet .. 650 milliGRAM(s) Oral every 6 hours PRN Temp greater or equal to 38C (100.4F), Mild Pain (1 - 3), Moderate Pain (4 - 6)  ALBUTerol    90 MICROgram(s) HFA Inhaler 2 Puff(s) Inhalation every 6 hours PRN Shortness of Breath and/or Wheezing      CAPILLARY BLOOD GLUCOSE      POCT Blood Glucose.: 190 mg/dL (2022 11:11)    I&O's Summary      PHYSICAL EXAM:  Vital Signs Last 24 Hrs  T(C): 36.5 (2022 12:22), Max: 36.8 (2022 04:51)  T(F): 97.7 (2022 12:22), Max: 98.2 (2022 04:51)  HR: 84 (2022 12:22) (79 - 85)  BP: 128/74 (2022 12:22) (128/74 - 143/78)  BP(mean): --  RR: 17 (2022 12:22) (17 - 17)  SpO2: 99% (2022 12:22) (95% - 99%)    Vital Signs Last 24 Hrs  T(C): 36.5 (2022 12:22), Max: 36.8 (2022 04:51)  T(F): 97.7 (2022 12:22), Max: 98.2 (2022 04:51)  HR: 84 (2022 12:22) (79 - 85)  BP: 128/74 (2022 12:22) (128/74 - 143/78)  BP(mean): --  RR: 17 (2022 12:22) (17 - 17)  SpO2: 99% (2022 12:22) (95% - 99%)    CONSTITUTIONAL: Well-groomed, in no apparent distress  EYES: No conjunctival or scleral injection, non-icteric; PERRLA and symmetric  ENMT: No external nasal lesions; nasal mucosa not inflamed; normal dentition; no pharyngeal injection or exudates, oral mucosa with moist membranes  NECK: Trachea midline without palpable neck mass; thyroid not enlarged and non-tender  RESPIRATORY: Breathing comfortably; no dullness to percussion; lungs CTA with decreased on the left slightly. without wheeze/rhonchi/rales  CARDIOVASCULAR: +S1S2, RRR, no M/G/R; no carotid bruits; pedal pulses full and symmetric; no lower extremity edema  GASTROINTESTINAL: No palpable masses or tenderness, +BS throughout, no rebound/guarding; no hepatosplenomegaly; no hernia palpated  LYMPHATIC: No cervical LAD or tenderness;  MUSCULOSKELETAL:  normal strength and tone of extremities  SKIN: No rashes or ulcers noted; no subcutaneous nodules or induration palpable  NEUROLOGIC: CN II-XII intact; normal reflexes in upper and lower extremities; sensation intact in LEs b/l to light touch  PSYCHIATRIC: A+O x 3; mood and affect appropriate; appropriate insight and judgment      LABS:                        14.1   5.47  )-----------( 208      ( 2022 06:57 )             48.9     01-16    143  |  102  |  7   ----------------------------<  59<L>  3.9   |  29  |  0.91    Ca    9.5      2022 06:57  Phos  3.2     01-14  Mg     1.80     -15    TPro  7.1  /  Alb  3.2<L>  /  TBili  0.5  /  DBili  x   /  AST  15  /  ALT  7   /  AlkPhos  53  01-15    PT/INR - ( 15 Robert 2022 08:30 )   PT: 15.0 sec;   INR: 1.32 ratio         PTT - ( 15 Robert 2022 08:30 )  PTT:31.2 sec      Urinalysis Basic - ( 2022 22:31 )    Color: Light Yellow / Appearance: Clear / S.015 / pH: x  Gluc: x / Ketone: Negative  / Bili: Negative / Urobili: <2 mg/dL   Blood: x / Protein: 30 mg/dL / Nitrite: Negative   Leuk Esterase: Negative / RBC: 4 /HPF / WBC 9 /HPF   Sq Epi: x / Non Sq Epi: 2 /HPF / Bacteria: Negative          RADIOLOGY & ADDITIONAL TESTS:  Results Reviewed:   Imaging Personally Reviewed:  Electrocardiogram Personally Reviewed:    COORDINATION OF CARE:  Care Discussed with Consultants/Other Providers [Y/N]:  Prior or Outpatient Records Reviewed [Y/N]:

## 2022-01-17 NOTE — CHART NOTE - NSCHARTNOTEFT_GEN_A_CORE
Spoke with CTSx this morning - as repeat imaging with pneumothorax improvement, no further intervention at this time, cleared for discharge.  On 1/17/2022, case was discussed with Dr. Brock, patient is medically cleared and optimized for discharge today. All medications were reviewed with attending, and sent to mutually agreed upon pharmacy- continue all home medications. Patient states he has all home medications.   Patient uses home supplemental oxygen, and baseline ambulatory independent with no assistance devices. Discussed with CM Esthela-> patient to setup for home care/PT, patient can be discharged this evening per CM. Patient and patient's wife informed that patient needs close FU with pulmonologist Dr. Aleman within 7days (NP called office [phone])- office is currently closed, patient and wife verbalized they will call office tomorrow for appt. Discussed the same with Dr. Brock-> okay to discharge patient home.   Patient has follow up appt with PCP Dr. Chu this Thursday 1/20/22 and with IR Dr. Ferreira on 1/18/22.

## 2022-01-17 NOTE — DISCHARGE NOTE NURSING/CASE MANAGEMENT/SOCIAL WORK - PATIENT PORTAL LINK FT
You can access the FollowMyHealth Patient Portal offered by [facility] by registering at the following website: [email or web address]. By joining [employer]’s FollowMyHealth portal, you will also be able to view your health information using other applications (apps) compatible with our system.

## 2022-01-17 NOTE — PROGRESS NOTE ADULT - PROBLEM SELECTOR PLAN 4
- last chemo completed 12/2021  - nothing to do at this time
- last chemo completed 12/2021  - nothing to do at this time

## 2022-01-17 NOTE — DISCHARGE NOTE PROVIDER - NSDCCPCAREPLAN_GEN_ALL_CORE_FT
PRINCIPAL DISCHARGE DIAGNOSIS  Diagnosis: Pneumothorax  Assessment and Plan of Treatment: New Small Left Pneumothorax  -EKG: NSR @ 91, no changes  -1/14 CXR - new small left basilar pneumothorax; Repeat CXR 1/15 and 1/16- stable  -CT scan showed b/l severe bullous disease and moderate left pneumothorax.New medial left lower lobe consolidation of uncertain etiology. Right upper lobe mass has increased in size.  Pneumothorax now resolving   -CTS consulted - no surgical intervention d/t high risk, No thoracic surgical FU needed   -Patient with known severe emphysema with multiple lung nodular opacities and masses with previous biopsy being negative.   -follow up with private pulmonologist within 1-2 weeks        SECONDARY DISCHARGE DIAGNOSES  Diagnosis: Emphysema of lung  Assessment and Plan of Treatment: home O2 @ 4 LPM NC  - not on any home medications  - started albuterol prn.  - follow up with Private Pulmonologist: Dr. Ramírez Aleman [phone]       Diagnosis: Hypertension  Assessment and Plan of Treatment: on no med, monitored by MD Solis  - monitor bp, not on any medications  - follow up with PCP Dr. Michelet Chu [phone]      Diagnosis: Bladder cancer  Assessment and Plan of Treatment: last chemo 12/21  - last chemo completed 12/2021  - CT CHEST with increased RUL mass and increased nodules. Patient with known severe emphysema with multiple lung nodular opacities and masses with previous biopsy being negative.  - Heme/Onc consulted - not on active treatment at this time, no acute intervention   - He was supposed to have pleural growth/lung mass biopsied outpatient.  - On discharge, to follow-up with Dr. Fabricio Willingham, [phone]  - follow up with IR Dr. Ferreira on 1/18/22 at 3:30pm at [address]     PRINCIPAL DISCHARGE DIAGNOSIS  Diagnosis: Pneumothorax  Assessment and Plan of Treatment: Found to have New Small Left Pneumothorax on imaging.  You were seen by the thoracic surgery doctor-> repeat imaging with improvement and decrease in size of pneumothorax, no further intervention needed at this time.  Recommend [facility] with your Pulmonologist or PCP within 1 week for close monitoring*******      SECONDARY DISCHARGE DIAGNOSES  Diagnosis: Emphysema of lung  Assessment and Plan of Treatment: Continue supplemental oxygen as directed, Continue your inhalers and annual pulmonary function tests with your outpatient provider. Monitor for acute signs such as, shortness of breath, hyperventilation, or any other difficulties breathing and report to the emergency room in the event that your rescue inhaler has not resolved your symptoms.  ***You have follow up with Interventional radiologist Dr. Ferreira on 1/18/22 at 3:30pm at [address]*****  ***IT IS IMPORTANT THAT YOU SEE YOUR OUTPATIENT PULMONOLOGIST WITHIN 5-7DAYS->>> Dr. Ramírez Aleman [phone] ******************************    Diagnosis: Hypertension  Assessment and Plan of Treatment: not on any medications, stable. Recommend close monitoring and follow up with your primary care doctor Dr. Michelet Chu [phone], appointment scheduled on Thursday 1/20/2022, call office to confirm**      Diagnosis: Bladder cancer  Assessment and Plan of Treatment: last chemo completed 12/2021.  CAT Scan chest with increased right upper lobe mass and increased nodules. As per oncology> no acute intervention or biopsy at this time. Recommend that you see Your oncologist Dr. Fabricio Willingham within 1-2 weeks [phone]) to discuss further plans for outpatient pleural growth/lung mass biopsy and treatment plans***

## 2022-01-17 NOTE — DISCHARGE NOTE PROVIDER - PROVIDER TOKENS
PROVIDER:[TOKEN:[[id number]],FOLLOWUP:[1 week],ESTABLISHEDPATIENT:[T]],PROVIDER:[TOKEN:[[id number]],FOLLOWUP:[1 week],ESTABLISHEDPATIENT:[T]],PROVIDER:[[id number]],SCHEDULEDAPPT:[01/18/2022],SCHEDULEDAPPTTIME:[03:30 PM],ESTABLISHEDPATIENT:[T]],PROVIDER:[TOKEN:[[id number]],FOLLOWUP:[2 weeks],ESTABLISHEDPATIENT:[T]] PROVIDER:[TOKEN:[[id number]],FOLLOWUP:[1 week],ESTABLISHEDPATIENT:[T]],PROVIDER:[TOKEN:[[id number]],SCHEDULEDAPPT:[01/20/2022],SCHEDULEDAPPTTIME:[09:00 AM],ESTABLISHEDPATIENT:[T]],PROVIDER:[TOKEN:[[id number]],SCHEDULEDAPPT:[01/18/2022],SCHEDULEDAPPTTIME:[03:30 PM],ESTABLISHEDPATIENT:[T]],PROVIDER:[TOKEN:[[id number]],FOLLOWUP:[2 weeks],ESTABLISHEDPATIENT:[T]],PROVIDER:[TOKEN:[[id number]],FOLLOWUP:[Routine]] PROVIDER:[TOKEN:[[id number]],SCHEDULEDAPPT:[01/19/2022],SCHEDULEDAPPTTIME:[03:15 PM],ESTABLISHEDPATIENT:[T]],PROVIDER:[TOKEN:[[id number]],SCHEDULEDAPPT:[01/20/2022],SCHEDULEDAPPTTIME:[09:00 AM],ESTABLISHEDPATIENT:[T]],PROVIDER:[[id number]],SCHEDULEDAPPT:[01/18/2022],SCHEDULEDAPPTTIME:[03:30 PM],ESTABLISHEDPATIENT:[T]],PROVIDER:[TOKEN:[[id number]],FOLLOWUP:[2 weeks],ESTABLISHEDPATIENT:[T]],PROVIDER:[TOKEN:[[id number]],FOLLOWUP:[Routine]]

## 2022-01-17 NOTE — PROGRESS NOTE ADULT - PROBLEM SELECTOR PLAN 5
Lovenox 40mg sc daily    Dispo: Pending PT recs Lovenox 40mg sc daily    Dispo: Home today  Discharge: 35 minutes spent

## 2022-01-17 NOTE — DISCHARGE NOTE PROVIDER - NSDCMRMEDTOKEN_GEN_ALL_CORE_FT
Advair Diskus 100 mcg-50 mcg inhalation powder: 1 puff(s) inhaled 2 times a day  Incruse Ellipta 62.5 mcg/inh inhalation powder: 1 puff(s) inhaled every 24 hours  ipratropium-albuterol 0.5 mg-2.5 mg/3 mL inhalation solution: 3 milliliter(s) inhaled every 6 hours, As Needed - for shortness of breath and/or wheezing   Vitamin B-12 1000 mcg oral tablet: 1 tab(s) orally once a day  Vitamin D3 50 mcg (2000 intl units) oral tablet: 1 tab(s) orally once a day

## 2022-01-17 NOTE — DISCHARGE NOTE PROVIDER - HOSPITAL COURSE
80 YO M with PMHx of Former Smoker (2 PPW x 40 yrs, quit 12/2021), COPD on 4L [place] Home O2, Pneumothorax, Vit D deficiency, Bladder Cancer (initially dx in 1/2020, s/p chemoRT and went into remission, then noted with recurrence 6/2021 s/p RPT chemotherapy, last chemotherapy 12/21) with Lung Nodules (found in 11/2021), HFpEF 67% (11/2021 ECHO w/ RVE with decreased RVSF), Moderate pHTN (not on any meds), and recent admission 11/2021 for peripheral edema and found with pHTN and right sided HF, now presented with SOB and weakness and found with Left pneumothorax..         New Small Left Pneumothorax  -EKG: NSR @ 91, no changes  -1/14 CXR - new small left basilar pneumothorax; Repeat CXR 1/15 and 1/16- stable  -CT scan showed b/l severe bullous disease and moderate left pneumothorax.New medial left lower lobe consolidation of uncertain etiology. Right upper lobe mass has increased in size.  Pneumothorax now resolving   -CTS consulted - no surgical intervention d/t high risk, No thoracic surgical FU needed   -Patient with known severe emphysema with multiple lung nodular opacities and masses with previous biopsy being negative.   -follow up with private pulmonologist within 1-2 weeks    pHTN  - monitor bp, not on any medications  - follow up with PCP Dr. Michelet Chu [phone]    COPD/Emphysema of lung.   - cont home O2 @ 4 LPM [place]  - not on any home medications  - started albuterol prn.  - follow up with Private Pulmonologist: Dr. Ramírez Aleman [phone]     Bladder Ca w/ lung mets  - last chemo completed 12/2021  - CT CHEST with increased RUL mass and increased nodules. Patient with known severe emphysema with multiple lung nodular opacities and masses with previous biopsy being negative.  - Heme/Onc consulted - not on active treatment at this time, no acute intervention   - He was supposed to have pleural growth/lung mass biopsied outpatient.  - On discharge, to follow-up with Dr. Fabricio Willingham, [phone]  - follow up with IR Dr. Ferreira on 1/18/22 at 3:30pm at [address]     Dispo: home    On 1/17/2022, case was discussed with Dr. Brock, patient is medically cleared and optimized for discharge today. All medications were reviewed with attending, and sent to mutually agreed upon pharmacy.   82 YO M with PMHx of Former Smoker (2 PPW x 40 yrs, quit 12/2021), COPD on 4L [place] Home O2, Pneumothorax, Vit D deficiency, Bladder Cancer (initially dx in 1/2020, s/p chemoRT and went into remission, then noted with recurrence 6/2021 s/p RPT chemotherapy, last chemotherapy 12/21) with Lung Nodules (found in 11/2021), HFpEF 67% (11/2021 ECHO w/ RVE with decreased RVSF), Moderate pHTN (not on any meds), and recent admission 11/2021 for peripheral edema and found with pHTN and right sided HF, now presented with SOB and weakness and found with Left pneumothorax.      New Small Left Pneumothorax  -EKG: NSR @ 91, no changes  -1/14 CXR - new small left basilar pneumothorax; Repeat CXR 1/15 and 1/16- stable  -CT scan showed b/l severe bullous disease and moderate left pneumothorax.New medial left lower lobe consolidation of uncertain etiology. Right upper lobe mass has increased in size.  Pneumothorax now resolving   -CTS consulted - no surgical intervention d/t high risk, No thoracic surgical FU needed   -Patient with known severe emphysema with multiple lung nodular opacities and masses with previous biopsy being negative.   -follow up with private pulmonologist within 1-2 weeks    pHTN  - monitor bp, not on any medications  - follow up with PCP Dr. Michelet Chu [phone]    COPD/Emphysema of lung.   - cont home O2 @ 4 LPM NC  - not on any home medications  - started albuterol prn.  - follow up with Private Pulmonologist: Dr. Ramírez Aleman [phone]     Bladder Ca w/ lung mets  - last chemo completed 12/2021  - CT CHEST with increased RUL mass and increased nodules. Patient with known severe emphysema with multiple lung nodular opacities and masses with previous biopsy being negative.  - Heme/Onc consulted - not on active treatment at this time, no acute intervention   - He was supposed to have pleural growth/lung mass biopsied outpatient.  - On discharge, to follow-up with Dr. Fabricio Willingham, [phone]  - follow up with IR Dr. Ferreira on 1/18/22 at 3:30pm at [address]     Dispo: home    On 1/17/2022, case was discussed with Dr. Brock, patient is medically cleared and optimized for discharge today. All medications were reviewed with attending, and sent to mutually agreed upon pharmacy.   80 YO M with PMHx of Former Smoker (2 PPW x 40 yrs, quit 12/2021), COPD on 4L [place] Home O2, Pneumothorax, Vit D deficiency, Bladder Cancer (initially dx in 1/2020, s/p chemoRT and went into remission, then noted with recurrence 6/2021 s/p RPT chemotherapy, last chemotherapy 12/21) with Lung Nodules (found in 11/2021), HFpEF 67% (11/2021 ECHO w/ RVE with decreased RVSF), Moderate pHTN (not on any meds), and recent admission 11/2021 for peripheral edema and found with pHTN and right sided HF, now presented with SOB and weakness and found with Left pneumothorax.  Evaluated by Thoracic surgery, repeat imaging with improvement- no further inpatient intervention. Patient remains hemodynamically stable and is medically cleared for discharge.     Hospital Course:   New Small Left Pneumothorax  -EKG: NSR @ 91, no changes  -1/14 CXR - new small left basilar pneumothorax; Repeat CXR 1/15 and 1/16- stable  -CT scan showed b/l severe bullous disease and moderate left pneumothorax.New medial left lower lobe consolidation of uncertain etiology. Right upper lobe mass has increased in size.  Pneumothorax now resolving   -CTS consulted - no surgical intervention d/t high risk, No thoracic surgical FU needed   -Patient with known severe emphysema with multiple lung nodular opacities and masses with previous biopsy being negative.   -follow up with private pulmonologist next week     pHTN  - monitor bp, not on any medications  - follow up with PCP Dr. Michelet Chu [phone], appointment on Thursday 1/20/22    COPD/Emphysema of lung.   - cont home O2 @ 4 LPM NC  - not on any home medications  - started albuterol prn.  - follow up with Private Pulmonologist: Dr. Ramírez Aleman [phone] next week     Bladder Ca w/ lung mets  - last chemo completed 12/2021  - CT CHEST with increased RUL mass and increased nodules. Patient with known severe emphysema with multiple lung nodular opacities and masses with previous biopsy being negative.  - Heme/Onc consulted - not on active treatment at this time, no acute intervention   - He was supposed to have pleural growth/lung mass biopsied outpatient.  - On discharge, to follow-up with Dr. Fabricio Willingham, [phone]  - follow up with IR Dr. Ferreira on 1/18/22 at 3:30pm at [address]     Dispo: home    On 1/17/2022, case was discussed with Dr. Brock, patient is medically cleared and optimized for discharge today. All medications were reviewed with attending, and sent to mutually agreed upon pharmacy.   80 YO M with PMHx of Former Smoker (2 PPW x 40 yrs, quit 12/2021), COPD on 4L [place] Home O2, Pneumothorax, Vit D deficiency, Bladder Cancer (initially dx in 1/2020, s/p chemoRT and went into remission, then noted with recurrence 6/2021 s/p RPT chemotherapy, last chemotherapy 12/21) with Lung Nodules (found in 11/2021), HFpEF 67% (11/2021 ECHO w/ RVE with decreased RVSF), Moderate pHTN (not on any meds), and recent admission 11/2021 for peripheral edema and found with pHTN and right sided HF, now presented with SOB and weakness and found with Left pneumothorax.  Evaluated by Thoracic surgery, repeat imaging with improvement- no further inpatient intervention. Patient remains hemodynamically stable and is medically cleared for discharge with return precautions. Pt to follow up with pulmonology within 1 week.     Hospital Course:   New Small Left Pneumothorax  -EKG: NSR @ 91, no changes  -1/14 CXR - new small left basilar pneumothorax; Repeat CXR 1/15 and 1/16- stable  -CT scan showed b/l severe bullous disease and moderate left pneumothorax.New medial left lower lobe consolidation of uncertain etiology. Right upper lobe mass has increased in size.  Pneumothorax now resolving   -CTS consulted - no surgical intervention d/t high risk, No thoracic surgical FU needed   -Patient with known severe emphysema with multiple lung nodular opacities and masses with previous biopsy being negative.   -follow up with private pulmonologist next week     pHTN  - monitor bp, not on any medications  - follow up with PCP Dr. Michelet Chu [phone], appointment on Thursday 1/20/22    COPD/Emphysema of lung.   - cont home O2 @ 4 LPM NC  - not on any home medications  - started albuterol prn.  - follow up with Private Pulmonologist: Dr. Ramírez Aleman [phone] next week     Bladder Ca w/ lung mets  - last chemo completed 12/2021  - CT CHEST with increased RUL mass and increased nodules. Patient with known severe emphysema with multiple lung nodular opacities and masses with previous biopsy being negative.  - Heme/Onc consulted - not on active treatment at this time, no acute intervention   - He was supposed to have pleural growth/lung mass biopsied outpatient.  - On discharge, to follow-up with Dr. Fabricio Willingham, [phone]  - follow up with IR Dr. Ferreira on 1/18/22 at 3:30pm at [address]     Dispo: home    On 1/17/2022, case was discussed with Dr. Brock, patient is medically cleared and optimized for discharge today. All medications were reviewed with attending, and sent to mutually agreed upon pharmacy.   82 YO M with PMHx of Former Smoker (2 PPW x 40 yrs, quit 12/2021), COPD on 4L [place] Home O2, Pneumothorax, Vit D deficiency, Bladder Cancer (initially dx in 1/2020, s/p chemoRT and went into remission, then noted with recurrence 6/2021 s/p RPT chemotherapy, last chemotherapy 12/21) with Lung Nodules (found in 11/2021), HFpEF 67% (11/2021 ECHO w/ RVE with decreased RVSF), Moderate pHTN (not on any meds), and recent admission 11/2021 for peripheral edema and found with pHTN and right sided HF, now presented with SOB and weakness and found with Left pneumothorax.  Evaluated by Thoracic surgery, repeat imaging with improvement- no further inpatient intervention. Patient remains hemodynamically stable and is medically cleared for discharge with return precautions. Pt to follow up with pulmonology within 1 week.     Hospital Course:   New Small Left Pneumothorax  -EKG: NSR @ 91, no changes  -1/14 CXR - new small left basilar pneumothorax; Repeat CXR 1/15 and 1/16- stable  -CT scan showed b/l severe bullous disease and moderate left pneumothorax.New medial left lower lobe consolidation of uncertain etiology. Right upper lobe mass has increased in size.  Pneumothorax now resolving   -CTS consulted - no surgical intervention d/t high risk, No thoracic surgical FU needed   -Patient with known severe emphysema with multiple lung nodular opacities and masses with previous biopsy being negative.   -follow up with private pulmonologist next week     pHTN  - monitor bp, not on any medications  - follow up with PCP Dr. Michelet Chu [phone], appointment on Thursday 1/20/22    COPD/Emphysema of lung.   - cont home O2 @ 4 LPM NC  - not on any home medications  - started albuterol prn.  - follow up with Private Pulmonologist: Dr. Ramírez Aleman [phone] on 1/19/22 at 3:15pm     Bladder Ca w/ lung mets  - last chemo completed 12/2021  - CT CHEST with increased RUL mass and increased nodules. Patient with known severe emphysema with multiple lung nodular opacities and masses with previous biopsy being negative.  - Heme/Onc consulted - not on active treatment at this time, no acute intervention   - He was supposed to have pleural growth/lung mass biopsied outpatient.  - On discharge, to follow-up with Dr. Fabricio Willingham, [phone]  - follow up with IR Dr. Ferreira on 1/18/22 at 3:30pm at [address]     Dispo: home    On 1/17/2022, case was discussed with Dr. Brock, patient is medically cleared and optimized for discharge today. All medications were reviewed with attending, and sent to mutually agreed upon pharmacy.

## 2022-01-17 NOTE — DISCHARGE NOTE PROVIDER - NPI NUMBER (FOR [employer] USE ONLY) :
[[id number]],[[id number]],[[id number]],[[id number]] [[id number]],[[id number]],[[id number]],[[id number]],[[id number]]

## 2022-01-17 NOTE — DISCHARGE NOTE PROVIDER - NSDCFUADDAPPT_GEN_ALL_CORE_FT
- follow up with PCP Dr. Michelet Chu [phone]  within 1-2weeks (repeat bloodwork CBC, BMP, LFTs)    ** follow up with Private Pulmonologist: Dr. Ramírez Aleman [phone] within 1week   - On discharge, to follow-up with Dr. Fabricio Willingham, [phone] - follow up with IR Dr. Ferreira on 1/18/22 at 3:30pm at [address]  - follow up with PCP Dr. Michelet Chu [phone]- appointment on  1/20/2022  within 1-2weeks (repeat bloodwork CBC, BMP, LFTs)    ** follow up with Private Pulmonologist: Dr. Ramírez Aleman [phone] within 1week****   - On discharge, to follow-up with Dr. Fabricio Willingham, [phone] - follow up with IR Dr. Ferreira on 1/18/22 at 3:30pm at [address]  - follow up with PCP Dr. Michelet Chu [phone]- appointment on  1/20/2022  within 1-2weeks (repeat bloodwork CBC, BMP, LFTs)    ** follow up with Private Pulmonologist: Dr. Ramírez Aleman [phone] within 1WEEK***   - On discharge, to follow-up with Dr. Fabricio Willingham, [phone] - follow up with IR Dr. Ferreira on 1/18/22 at 3:30pm at [address]  - follow up with PCP Dr. Michelet Chu [phone]- appointment on  1/20/2022  within 1-2weeks (repeat bloodwork CBC, BMP, LFTs)    ** follow up with Private Pulmonologist: Dr. Ramírez Aleman [phone] on 1/19/2022 at 3:15pm   - On discharge, to follow-up with Dr. Fabricio Willingham, [phone] - follow up with IR Dr. Ferreira on 1/18/22 at 3:30pm at [address]

## 2022-01-17 NOTE — PROGRESS NOTE ADULT - PROBLEM SELECTOR PLAN 1
1/14 CXR - new small left pneumothorax, repeat on 1/15 and 1/16 shows stability of small PTx on the left. 1/17 resolving small L pneumothorax  - CTS c/s following - nothing to do  CT Chest 1/15: small to moderate left pneumothorax.. New medial left lower lobe consolidation of uncertain etiology. Right upper lobe mass has increased in size.  -Pt will need to follow outpatient.   -Pt improved from resp standpoint
1/14 CXR - new small left pneumothorax, repeat on 1/15 and 1/16 shows stability of small PTx on the left.   - CTS c/s following - nothing to do  CT Chest 1/15: small to moderate left pneumothorax.. New medial left lower lobe consolidation of uncertain etiology. Right upper lobe mass has increased in size.  -Pt will need to follow with pulmonary outpatient.   -Pt improved from resp standpoint

## 2022-01-17 NOTE — PROGRESS NOTE ADULT - ASSESSMENT
82 yo male with COPD on home O2 4l, came to ED two days ago c/o inc oxygen requirements. CXR in ED revealed left basilar ptx, CT scan showed b/l severe bullous disease an d small mod ptx. This ptx is resolving and pt can be discharged when medically ready. No thoracic surgical follow up needed.
80 y/o male, with a PmHx of COPD on Home O2 @ 4L [place], Pneumothorax, Vit D deficiency, pHTN (not on any meds), Bladder Ca (last chemo 12/21), presented to the [facility] ED with SOB and weakness. Admitted to medicine for a stable/resolving small left pneumothorax.
82 y/o male, with a PmHx of COPD on Home O2 @ 4L [place], Pneumothorax, Vit D deficiency, pHTN (not on any meds), Bladder Ca (last chemo 12/21), presented to the [facility] ED with SOB and weakness. Admitted to medicine for a stable small left pneumothorax.

## 2022-01-17 NOTE — DISCHARGE NOTE NURSING/CASE MANAGEMENT/SOCIAL WORK - NSDCFUADDAPPT_GEN_ALL_CORE_FT
- follow up with PCP Dr. Michelet Chu [phone]- appointment on  1/20/2022  within 1-2weeks (repeat bloodwork CBC, BMP, LFTs)    ** follow up with Private Pulmonologist: Dr. Ramríez Aleman [phone] within 1week****   - On discharge, to follow-up with Dr. Fabricio Willingham, [phone] - follow up with IR Dr. Ferreira on 1/18/22 at 3:30pm at [address]

## 2022-01-17 NOTE — PROGRESS NOTE ADULT - PROBLEM SELECTOR PLAN 2
- cont home O2 @ 4 LPM NC  - not on any home medications  - started albuterol prn
- cont home O2 @ 4 LPM NC  - not on any home medications  - started albuterol prn

## 2022-01-17 NOTE — DISCHARGE NOTE PROVIDER - CARE PROVIDERS DIRECT ADDRESSES
,[email or web address],DirectAddress_Unknown,[email or web address],[email or web address] ,[email or web address],DirectAddress_Unknown,[email or web address],DirectAddress_Unknown,[email or web address]

## 2022-01-17 NOTE — DISCHARGE NOTE NURSING/CASE MANAGEMENT/SOCIAL WORK - NSDCPEFALRISK_GEN_ALL_CORE
For information on Fall & Injury Prevention, visit: [email or web address] OR  [email or web address] OR  https://www.cdc.gov/steadi/patient.html

## 2022-01-17 NOTE — DISCHARGE NOTE PROVIDER - CARE PROVIDER_API CALL
Ramírez Aleman  INTERNAL MEDICINE  [address] [facility], [address] Level [address]  [place] [address]  Phone: [phone]  Fax: [phone]  Established Patient  Follow Up Time: 1 week    Michelet Chu  GASTROENTEROLOGY  [address] [facility], [address]  [place] [address]  Phone: [phone]  Fax: [phone]  Established Patient  Follow Up Time: 1 week    Braden Ferreira)  Interventional Radiology and Diagnostic Radiology  [address] [place] [address]  Phone: [phone]  Fax: [phone]  Established Patient  Scheduled Appointment: 01/18/2022 03:30 PM    Fabricio Willingham)  Internal Medicine  [address]  [place] [address]  Phone: [phone]  Fax: [phone]  Established Patient  Follow Up Time: 2 weeks   Ramírez Aleman  INTERNAL MEDICINE  [address] [facility], Lower Level [address]  [place] [address]  Phone: [phone]  Fax: [phone]  Established Patient  Follow Up Time: 1 week    Michelet Chu  GASTROENTEROLOGY  [address] [facility], [address]  [place] [address]  Phone: [phone]  Fax: [phone]  Established Patient  Scheduled Appointment: 01/20/2022 09:00 AM    Braden Ferreira)  Interventional Radiology and Diagnostic Radiology  [address] [place] [address]  Phone: [phone]  Fax: [phone]  Established Patient  Scheduled Appointment: 01/18/2022 03:30 PM    Fabricio Willingham)  Internal Medicine  [address]  [place] [address]  Phone: [phone]  Fax: [phone]  Established Patient  Follow Up Time: 2 weeks    Leonel Granados)  Surgery; Thoracic Surgery  [address] [place] [address], Oncology [place] [address]  Phone: [phone]  Fax: [phone]  Follow Up Time: Routine   Ramírez Aleman  INTERNAL MEDICINE  [address] [facility], Lower [address] 1  [place] [address]  Phone: [phone]  Fax: [phone]  Established Patient  Scheduled Appointment: 01/19/2022 03:15 PM    Michelet Chu  GASTROENTEROLOGY  [address] [facility], [address]  [place] [address]  Phone: [phone]  Fax: [phone]  Established Patient  Scheduled Appointment: 01/20/2022 09:00 AM    Braden Ferreira)  Interventional Radiology and Diagnostic Radiology  [address] [place] [address]  Phone: [phone]  Fax: [phone]  Established Patient  Scheduled Appointment: 01/18/2022 03:30 PM    Fabricio Willingham)  Internal Medicine  [address] [place] [address]  Phone: [phone]  Fax: [phone]  Established Patient  Follow Up Time: 2 weeks    Leonel Granados)  Surgery; Thoracic Surgery  [address] [place], Oncology [place] [address]  Phone: [phone]  Fax: [phone]  Follow Up Time: Routine

## 2022-01-18 ENCOUNTER — APPOINTMENT (OUTPATIENT)
Dept: INTERVENTIONAL RADIOLOGY/VASCULAR | Facility: CLINIC | Age: 82
End: 2022-01-18
Payer: MEDICARE

## 2022-01-18 VITALS — WEIGHT: 168 LBS | HEIGHT: 72 IN | BODY MASS INDEX: 22.75 KG/M2

## 2022-01-18 PROCEDURE — 99204 OFFICE O/P NEW MOD 45 MIN: CPT | Mod: 95

## 2022-01-18 RX ORDER — FLUTICASONE PROPION/SALMETEROL 250-50 MCG
250-50 BLISTER, WITH INHALATION DEVICE INHALATION
Refills: 0 | Status: ACTIVE | COMMUNITY

## 2022-01-18 RX ORDER — AZITHROMYCIN 250 MG/1
250 TABLET, FILM COATED ORAL
Qty: 6 | Refills: 0 | Status: DISCONTINUED | COMMUNITY
Start: 2018-07-17 | End: 2022-01-18

## 2022-01-18 RX ORDER — NAPROXEN 500 MG/1
500 TABLET ORAL
Qty: 28 | Refills: 0 | Status: DISCONTINUED | COMMUNITY
Start: 2018-06-03 | End: 2022-01-18

## 2022-01-18 RX ORDER — UMECLIDINIUM 62.5 UG/1
62.5 AEROSOL, POWDER ORAL
Refills: 0 | Status: ACTIVE | COMMUNITY

## 2022-01-18 RX ORDER — OLMESARTAN MEDOXOMIL AND HYDROCHLOROTHIAZIDE 20; 12.5 MG/1; MG/1
20-12.5 TABLET ORAL
Qty: 90 | Refills: 0 | Status: DISCONTINUED | COMMUNITY
Start: 2017-06-19 | End: 2022-01-18

## 2022-01-18 RX ORDER — FLUOCINONIDE 0.5 MG/G
0.05 CREAM TOPICAL
Qty: 60 | Refills: 0 | Status: DISCONTINUED | COMMUNITY
Start: 2017-03-20 | End: 2022-01-18

## 2022-01-18 NOTE — CHART NOTE - NSCHARTNOTEFT_GEN_A_CORE
Called Pulmonology office of Dr. Ramírez Aleman [phone] for follow up appt for discharged patient, spoke with  Candy-> appt setup for tomorrow 1/19/22 at 3:15pm. Writer called and spoke with patient's wife Sotero Gomez ([phone]) and informed about the appointment, office will also inform patient.

## 2022-01-19 NOTE — DATA REVIEWED
[FreeTextEntry1] : CT 1/11/22  images reviewed and results discussed at length with the patient.\par

## 2022-01-19 NOTE — HISTORY OF PRESENT ILLNESS
[Home] : at home, [unfilled] , at the time of the visit. [Medical Office: ([facility])___] : at the medical office located in  [Spouse] : spouse [Verbal consent obtained from patient] : the patient, [unfilled] [FreeTextEntry1] : 81 year old male with history of right knee DVT 2020 was on Eliquis initially but later stopped, emphysema, nodular calcified and noncalcified opacities in the lungs since 2019 see Dr. Aleman pulmonologist. He has been on surveillance scans. Also history of  pneumothorax. He also had episode of hematuria and had CT scan done that showed bladder mass. 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 He had chemo and RT to urinary bladder completed in may 2020. He noted to have recurrence bladder cancer. 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 2021. He has been on surveillance scans for lung nodules and had biopsy in the past June 2019 that was inconclusive . He had PET scan done on 12/27/21 that showed noncalcified FDG avid subpleural nodule in the right upper lobe increased in size since the November 2021.\par \par Patient was admitted at [facility] for left pneumothorax and was discharged on 01/19/22. \par \par \par  Pt followed up Dr. Colmenares and he referred the patient to IR for RUL lung lesion biopsy. \par \par Denies any recent CP, fever, chills, n/v/d. Some SOB with exertion. \par \par Patient is not on any anticoagulants at this time. \par \par Of note patient is on home oxygen 3L via NC. \par

## 2022-01-19 NOTE — REVIEW OF SYSTEMS
[Fever] : no fever [Chills] : no chills [Loss Of Hearing] : no hearing loss [Nosebleeds] : no nosebleeds [Chest Pain] : no chest pain [Palpitations] : no palpitations [Shortness Of Breath] : no shortness of breath [Diarrhea] : no diarrhea [Easy Bleeding] : no tendency for easy bleeding [Easy Bruising] : no tendency for easy bruising

## 2022-01-19 NOTE — ASSESSMENT
[FreeTextEntry1] : The patient is an 81 years old male with urothelial carcinoma and pulmonary metastases. he was recently admitted for pneumothorax which persists on CT. He currently denies CP, but reports persistent SOB. He reports he is scheduled to follow up with Pulmonary Dr. Aleman on 1/19/22. He also reports having a PET CT at outside facility in December.\par \par Pt will bring PET CT for imaging review. Case discussed with Dr Granados. Plan for review of PET to determine if any lesions outside the thorax are amenable for biopsy. If not, can discuss biopsy of right upper lobe mass. Risks, benefits and plan for therapy would need to be discussed prior to biopsy. This should include a discussion of the risks of pneumothorax, that has occurred spontaneously. Will discuss with Dr. Aleman pulmonary and reeval after PET CT is available.

## 2022-02-09 ENCOUNTER — EMERGENCY (EMERGENCY)
Facility: HOSPITAL | Age: 82
LOS: 1 days | Discharge: HOME | End: 2022-02-09
Attending: EMERGENCY MEDICINE | Admitting: EMERGENCY MEDICINE
Payer: MEDICARE

## 2022-02-09 VITALS
SYSTOLIC BLOOD PRESSURE: 125 MMHG | DIASTOLIC BLOOD PRESSURE: 68 MMHG | HEART RATE: 101 BPM | HEIGHT: 73 IN | TEMPERATURE: 98 F | RESPIRATION RATE: 17 BRPM | OXYGEN SATURATION: 97 %

## 2022-02-09 DIAGNOSIS — Z90.49 ACQUIRED ABSENCE OF OTHER SPECIFIED PARTS OF DIGESTIVE TRACT: Chronic | ICD-10-CM

## 2022-02-09 DIAGNOSIS — Z87.828 PERSONAL HISTORY OF OTHER (HEALED) PHYSICAL INJURY AND TRAUMA: Chronic | ICD-10-CM

## 2022-02-09 DIAGNOSIS — Z98.890 OTHER SPECIFIED POSTPROCEDURAL STATES: Chronic | ICD-10-CM

## 2022-02-09 DIAGNOSIS — Z98.49 CATARACT EXTRACTION STATUS, UNSPECIFIED EYE: Chronic | ICD-10-CM

## 2022-02-09 LAB
APPEARANCE UR: ABNORMAL
BACTERIA # UR AUTO: ABNORMAL
BILIRUB UR-MCNC: NEGATIVE — SIGNIFICANT CHANGE UP
COLOR SPEC: ABNORMAL
DIFF PNL FLD: ABNORMAL
EPI CELLS # UR: 4 /HPF — SIGNIFICANT CHANGE UP (ref 0–5)
GLUCOSE UR QL: NEGATIVE — SIGNIFICANT CHANGE UP
HCT VFR BLD CALC: 41.6 % — SIGNIFICANT CHANGE UP (ref 39–50)
HGB BLD-MCNC: 13.1 G/DL — SIGNIFICANT CHANGE UP (ref 13–17)
HYALINE CASTS # UR AUTO: 2 /LPF — SIGNIFICANT CHANGE UP (ref 0–7)
KETONES UR-MCNC: NEGATIVE — SIGNIFICANT CHANGE UP
LEUKOCYTE ESTERASE UR-ACNC: ABNORMAL
MCHC RBC-ENTMCNC: 27.8 PG — SIGNIFICANT CHANGE UP (ref 27–34)
MCHC RBC-ENTMCNC: 31.5 GM/DL — LOW (ref 32–36)
MCV RBC AUTO: 88.1 FL — SIGNIFICANT CHANGE UP (ref 80–100)
NITRITE UR-MCNC: NEGATIVE — SIGNIFICANT CHANGE UP
NRBC # BLD: 0 /100 WBCS — SIGNIFICANT CHANGE UP
NRBC # FLD: 0 K/UL — SIGNIFICANT CHANGE UP
PH UR: 6 — SIGNIFICANT CHANGE UP (ref 5–8)
PLATELET # BLD AUTO: 256 K/UL — SIGNIFICANT CHANGE UP (ref 150–400)
PROT UR-MCNC: ABNORMAL
RBC # BLD: 4.72 M/UL — SIGNIFICANT CHANGE UP (ref 4.2–5.8)
RBC # FLD: 20.2 % — HIGH (ref 10.3–14.5)
RBC CASTS # UR COMP ASSIST: 326 /HPF — HIGH (ref 0–4)
SP GR SPEC: 1.02 — SIGNIFICANT CHANGE UP (ref 1–1.05)
UROBILINOGEN FLD QL: SIGNIFICANT CHANGE UP
WBC # BLD: 15.4 K/UL — HIGH (ref 3.8–10.5)
WBC # FLD AUTO: 15.4 K/UL — HIGH (ref 3.8–10.5)
WBC UR QL: 75 /HPF — HIGH (ref 0–5)

## 2022-02-09 PROCEDURE — 99284 EMERGENCY DEPT VISIT MOD MDM: CPT | Mod: 25,GC

## 2022-02-09 PROCEDURE — 93010 ELECTROCARDIOGRAM REPORT: CPT | Mod: GC

## 2022-02-09 RX ORDER — SODIUM CHLORIDE 9 MG/ML
1000 INJECTION INTRAMUSCULAR; INTRAVENOUS; SUBCUTANEOUS ONCE
Refills: 0 | Status: COMPLETED | OUTPATIENT
Start: 2022-02-09 | End: 2022-02-09

## 2022-02-09 RX ADMIN — SODIUM CHLORIDE 1000 MILLILITER(S): 9 INJECTION INTRAMUSCULAR; INTRAVENOUS; SUBCUTANEOUS at 23:11

## 2022-02-09 NOTE — ED PROVIDER NOTE - NS ED ROS FT
GENERAL: no fever, chills  HEENT: no throat pain, cough, congestion, dysphagia  CARDIAC: no chest pain, palpitations  PULM: no shortness of breath, cough, wheezing   GI: no abdominal pain, nausea, vomiting, diarrhea, constipation  : +lower abd pain, minimal urine output  NEURO: no headache, lightheadedness  MSK: no joint or muscle pain  SKIN: no rashes, no ulcers  HEME: no active bleeding, no supraclavicular LAD

## 2022-02-09 NOTE — ED PROVIDER NOTE - NSFOLLOWUPINSTRUCTIONS_ED_ALL_ED_FT
Discharge instructions:    - Please follow up with your Primary Care Doctor within the next 3-5 days.    - We recommend you follow up with your Urology; you'll likely need repeat blood tests.     - Take any prescribed medications as instructed:         SEEK IMMEDIATE MEDICAL CARE IF YOU HAVE ANY OF THE FOLLOWING SYMPTOMS: severe back or abdominal pain, fever, inability to keep fluids or medicine down, dizziness/lightheadedness, or a change in mental status.

## 2022-02-09 NOTE — ED ADULT NURSE NOTE - OBJECTIVE STATEMENT
pt presents endorsing no urinary output  for 1 day after stent had been placed in bladder, pt is distended with pain on exam, SOB and placed on NRB in triage. Patient states he uses 3L of o2 at home continuously. diminished breath sounds and accessory muscle use noted.

## 2022-02-09 NOTE — ED PROVIDER NOTE - OBJECTIVE STATEMENT
80 yo M pmh COPD (on home O2 @ 4L NC), Pneumothorax (1/22), Vit D deficiency, pHTN (not on any meds), Bladder Ca (last chemo 12/21) presenting with lower abdominal pain and urinary retention. Pt reports that he was recently at [facility] for "kidney swelling" which required tubes to be placed. He states that he was discharged on 2/7, but since then has only been able to void scant amounts. Denies fevers/chills, CP, SOB, n/v/d.

## 2022-02-09 NOTE — ED PROVIDER NOTE - CLINICAL SUMMARY MEDICAL DECISION MAKING FREE TEXT BOX
80 yo M pmh COPD (on home O2 @ 4L NC), Pneumothorax (1/22), Vit D deficiency, pHTN (not on any meds), Bladder Ca (last chemo 12/21) presenting with lower abdominal pain and urinary retention. Now s/p wilson placement with drainage of blood tinged urine. 82 yo M pmh COPD (on home O2 @ 4L NC), Pneumothorax (1/22), Vit D deficiency, pHTN (not on any meds), Bladder Ca (last chemo 12/21) presenting with lower abdominal pain and urinary retention. Now s/p wilson placement with drainage of ~250ccs blood tinged urine.

## 2022-02-09 NOTE — ED PROVIDER NOTE - ATTENDING CONTRIBUTION TO CARE
81M h/o COPD on home O2; recent discharge due to SOB with pneumo in Jan from here.  H/o bladder CA pt says in remission.  P/w lower abd pain and urinary retention.  Recent admission at [facility] for ureteral stents.  Pt says "tubes placed in kidneys" and discharged. Only able to void scant amounts.  No F/c.  Roberts placed in ED with 200-300 cc drainage.  Rx fluids.  Check labs, and urine.  Dispo depending on labs, clinical status.  VS:  unremarkable except tachycardia     GEN - NAD;   well appearing;   A+O x3   HEAD - NC/AT     ENT - PEERL, EOMI, mucous membranes  dry , no discharge      NECK: Neck supple, non-tender without lymphadenopathy, no masses, no JVD  PULM - CTA b/l,  symmetric breath sounds  COR -  normal heart sounds    ABD - , ND, SP ttp, soft,  BACK - no CVA tenderness, nontender spine     EXTREMS - no edema, no deformity, warm and well perfused    SKIN - no rash    or bruising      NEUROLOGIC - alert, face symmetric, speech fluent, sensation nl, motor no focal deficit.

## 2022-02-09 NOTE — ED PROVIDER NOTE - PROGRESS NOTE DETAILS
Sunny, PGY2: Patient reassessed and noting symptomatic improvement in abdominal pain. Creatine is downtrending s/p catheter. Patient's CTAP demonstrates b/l nephroureteral stents with mild bilateral hydronephrosis and redemonstration of bladder mass, moderate ascites. and redemonstration of several bibasilar calcified and noncalcified pulmonary nodules and masses. Patient has scheduled outpatient Urology follow up. Will send patient on ABs. Discussed w/ patient and patient's wife, both feel comfortable with discharge w/ wilson catheter. Strict return precautions provided and patient understands and agrees w/ plan.

## 2022-02-09 NOTE — ED PROVIDER NOTE - PHYSICAL EXAMINATION
CONSTITUTIONAL: alert and active in no apparent distress; appears thin  HEENT: head atraumatic; normal cephalic shape; no conjunctivitis or scleral icterus; EOMI; neck supple w/ FROM  CARDIAC: regular rate & rhythm; normal S1, S2; no murmurs, rubs or gallops.  RESPIRATORY: breath sounds clear to auscultation bilaterally; no distress present, no crackles, wheezes, rales, rhonchi, retractions, or tachypnea; normal rate and effort.  GASTROINTESTINAL: abdomen soft, non-tender, & non-distended; no organomegaly or masses; no HSM appreciated; normoactive bowel sounds.  SKIN: cap refill brisk; skin warm, dry and intact; no evidence of rash.  BACK: no vertebral or paraspinal tenderness along entire spine; no CVAT.  MSK: no joint effusion or tenderness; FROM of all joints; no deformities or erythema noted; 2+ peripheral pulses.  NEURO: alert; interactive; no focal deficits.

## 2022-02-09 NOTE — ED PROVIDER NOTE - PATIENT PORTAL LINK FT
You can access the FollowMyHealth Patient Portal offered by [facility] by registering at the following website: [email or web address]. By joining [facility]’s FollowMyHealth portal, you will also be able to view your health information using other applications (apps) compatible with our system.

## 2022-02-09 NOTE — ED ADULT TRIAGE NOTE - CHIEF COMPLAINT QUOTE
pt had kidney procedure with 2 stents placed 3 days ago and now has not urinated since 6pm last night. nc 3L as per home, sat 82, placed on NRB  #18s/l to L arm

## 2022-02-10 VITALS
DIASTOLIC BLOOD PRESSURE: 78 MMHG | TEMPERATURE: 98 F | RESPIRATION RATE: 20 BRPM | SYSTOLIC BLOOD PRESSURE: 124 MMHG | OXYGEN SATURATION: 95 % | HEART RATE: 94 BPM

## 2022-02-10 PROBLEM — C67.9 MALIGNANT NEOPLASM OF BLADDER, UNSPECIFIED: Chronic | Status: ACTIVE | Noted: 2022-01-15

## 2022-02-10 LAB
ALBUMIN SERPL ELPH-MCNC: 2.6 G/DL — LOW (ref 3.3–5)
ALP SERPL-CCNC: 65 U/L — SIGNIFICANT CHANGE UP (ref 40–120)
ALT FLD-CCNC: 13 U/L — SIGNIFICANT CHANGE UP (ref 4–41)
ANION GAP SERPL CALC-SCNC: 14 MMOL/L — SIGNIFICANT CHANGE UP (ref 7–14)
ANION GAP SERPL CALC-SCNC: 15 MMOL/L — HIGH (ref 7–14)
AST SERPL-CCNC: 24 U/L — SIGNIFICANT CHANGE UP (ref 4–40)
BILIRUB SERPL-MCNC: 0.3 MG/DL — SIGNIFICANT CHANGE UP (ref 0.2–1.2)
BUN SERPL-MCNC: 41 MG/DL — HIGH (ref 7–23)
BUN SERPL-MCNC: 49 MG/DL — HIGH (ref 7–23)
CALCIUM SERPL-MCNC: 8.7 MG/DL — SIGNIFICANT CHANGE UP (ref 8.4–10.5)
CALCIUM SERPL-MCNC: 9.2 MG/DL — SIGNIFICANT CHANGE UP (ref 8.4–10.5)
CHLORIDE SERPL-SCNC: 102 MMOL/L — SIGNIFICANT CHANGE UP (ref 98–107)
CHLORIDE SERPL-SCNC: 97 MMOL/L — LOW (ref 98–107)
CO2 SERPL-SCNC: 17 MMOL/L — LOW (ref 22–31)
CO2 SERPL-SCNC: 23 MMOL/L — SIGNIFICANT CHANGE UP (ref 22–31)
CREAT SERPL-MCNC: 3.57 MG/DL — HIGH (ref 0.5–1.3)
CREAT SERPL-MCNC: 4.83 MG/DL — HIGH (ref 0.5–1.3)
GLUCOSE SERPL-MCNC: 101 MG/DL — HIGH (ref 70–99)
GLUCOSE SERPL-MCNC: 93 MG/DL — SIGNIFICANT CHANGE UP (ref 70–99)
MAGNESIUM SERPL-MCNC: 1.9 MG/DL — SIGNIFICANT CHANGE UP (ref 1.6–2.6)
PHOSPHATE SERPL-MCNC: 4.7 MG/DL — HIGH (ref 2.5–4.5)
POTASSIUM SERPL-MCNC: 5.1 MMOL/L — SIGNIFICANT CHANGE UP (ref 3.5–5.3)
POTASSIUM SERPL-MCNC: SIGNIFICANT CHANGE UP MMOL/L (ref 3.5–5.3)
POTASSIUM SERPL-SCNC: 5.1 MMOL/L — SIGNIFICANT CHANGE UP (ref 3.5–5.3)
POTASSIUM SERPL-SCNC: SIGNIFICANT CHANGE UP MMOL/L (ref 3.5–5.3)
PROT SERPL-MCNC: 7.4 G/DL — SIGNIFICANT CHANGE UP (ref 6–8.3)
SARS-COV-2 RNA SPEC QL NAA+PROBE: SIGNIFICANT CHANGE UP
SODIUM SERPL-SCNC: 134 MMOL/L — LOW (ref 135–145)
SODIUM SERPL-SCNC: 134 MMOL/L — LOW (ref 135–145)

## 2022-02-10 PROCEDURE — 74176 CT ABD & PELVIS W/O CONTRAST: CPT | Mod: 26,MG

## 2022-02-10 PROCEDURE — G1004: CPT

## 2022-02-10 RX ORDER — CEFPODOXIME PROXETIL 100 MG
1 TABLET ORAL
Qty: 20 | Refills: 0
Start: 2022-02-10 | End: 2022-02-19

## 2022-02-10 RX ORDER — CEFTRIAXONE 500 MG/1
1000 INJECTION, POWDER, FOR SOLUTION INTRAMUSCULAR; INTRAVENOUS ONCE
Refills: 0 | Status: COMPLETED | OUTPATIENT
Start: 2022-02-10 | End: 2022-02-10

## 2022-02-10 RX ADMIN — CEFTRIAXONE 1000 MILLIGRAM(S): 500 INJECTION, POWDER, FOR SOLUTION INTRAMUSCULAR; INTRAVENOUS at 01:00

## 2022-02-10 RX ADMIN — CEFTRIAXONE 100 MILLIGRAM(S): 500 INJECTION, POWDER, FOR SOLUTION INTRAMUSCULAR; INTRAVENOUS at 00:27

## 2022-02-10 RX ADMIN — SODIUM CHLORIDE 1000 MILLILITER(S): 9 INJECTION INTRAMUSCULAR; INTRAVENOUS; SUBCUTANEOUS at 00:20

## 2022-03-11 ENCOUNTER — INPATIENT (INPATIENT)
Facility: HOSPITAL | Age: 82
LOS: 6 days | Discharge: HOME HEALTH | End: 2022-03-18
Attending: SURGERY | Admitting: SURGERY
Payer: MEDICARE

## 2022-03-11 VITALS
SYSTOLIC BLOOD PRESSURE: 103 MMHG | TEMPERATURE: 98 F | OXYGEN SATURATION: 100 % | HEIGHT: 73 IN | HEART RATE: 104 BPM | RESPIRATION RATE: 20 BRPM | DIASTOLIC BLOOD PRESSURE: 54 MMHG

## 2022-03-11 DIAGNOSIS — Z98.890 OTHER SPECIFIED POSTPROCEDURAL STATES: Chronic | ICD-10-CM

## 2022-03-11 DIAGNOSIS — Z87.828 PERSONAL HISTORY OF OTHER (HEALED) PHYSICAL INJURY AND TRAUMA: Chronic | ICD-10-CM

## 2022-03-11 DIAGNOSIS — Z90.49 ACQUIRED ABSENCE OF OTHER SPECIFIED PARTS OF DIGESTIVE TRACT: Chronic | ICD-10-CM

## 2022-03-11 DIAGNOSIS — Z98.49 CATARACT EXTRACTION STATUS, UNSPECIFIED EYE: Chronic | ICD-10-CM

## 2022-03-11 LAB
BASOPHILS # BLD AUTO: 0.02 K/UL — SIGNIFICANT CHANGE UP (ref 0–0.2)
BASOPHILS NFR BLD AUTO: 0.2 % — SIGNIFICANT CHANGE UP (ref 0–2)
BLOOD GAS VENOUS COMPREHENSIVE RESULT: SIGNIFICANT CHANGE UP
EOSINOPHIL # BLD AUTO: 0.01 K/UL — SIGNIFICANT CHANGE UP (ref 0–0.5)
EOSINOPHIL NFR BLD AUTO: 0.1 % — SIGNIFICANT CHANGE UP (ref 0–6)
HCT VFR BLD CALC: 35.7 % — LOW (ref 39–50)
HGB BLD-MCNC: 11 G/DL — LOW (ref 13–17)
IANC: 10.56 K/UL — HIGH (ref 1.5–8.5)
IMM GRANULOCYTES NFR BLD AUTO: 0.5 % — SIGNIFICANT CHANGE UP (ref 0–1.5)
LYMPHOCYTES # BLD AUTO: 0.78 K/UL — LOW (ref 1–3.3)
LYMPHOCYTES # BLD AUTO: 6.4 % — LOW (ref 13–44)
MCHC RBC-ENTMCNC: 28.1 PG — SIGNIFICANT CHANGE UP (ref 27–34)
MCHC RBC-ENTMCNC: 30.8 GM/DL — LOW (ref 32–36)
MCV RBC AUTO: 91.3 FL — SIGNIFICANT CHANGE UP (ref 80–100)
MONOCYTES # BLD AUTO: 0.83 K/UL — SIGNIFICANT CHANGE UP (ref 0–0.9)
MONOCYTES NFR BLD AUTO: 6.8 % — SIGNIFICANT CHANGE UP (ref 2–14)
NEUTROPHILS # BLD AUTO: 10.56 K/UL — HIGH (ref 1.8–7.4)
NEUTROPHILS NFR BLD AUTO: 86 % — HIGH (ref 43–77)
NRBC # BLD: 0 /100 WBCS — SIGNIFICANT CHANGE UP
NRBC # FLD: 0.03 K/UL — HIGH
PLATELET # BLD AUTO: 321 K/UL — SIGNIFICANT CHANGE UP (ref 150–400)
RBC # BLD: 3.91 M/UL — LOW (ref 4.2–5.8)
RBC # FLD: 17.6 % — HIGH (ref 10.3–14.5)
WBC # BLD: 12.26 K/UL — HIGH (ref 3.8–10.5)
WBC # FLD AUTO: 12.26 K/UL — HIGH (ref 3.8–10.5)

## 2022-03-11 PROCEDURE — 99285 EMERGENCY DEPT VISIT HI MDM: CPT

## 2022-03-11 RX ORDER — SODIUM CHLORIDE 9 MG/ML
1000 INJECTION, SOLUTION INTRAVENOUS ONCE
Refills: 0 | Status: COMPLETED | OUTPATIENT
Start: 2022-03-11 | End: 2022-03-11

## 2022-03-11 RX ORDER — MORPHINE SULFATE 50 MG/1
4 CAPSULE, EXTENDED RELEASE ORAL ONCE
Refills: 0 | Status: DISCONTINUED | OUTPATIENT
Start: 2022-03-11 | End: 2022-03-11

## 2022-03-11 RX ADMIN — SODIUM CHLORIDE 1000 MILLILITER(S): 9 INJECTION, SOLUTION INTRAVENOUS at 23:23

## 2022-03-11 RX ADMIN — MORPHINE SULFATE 4 MILLIGRAM(S): 50 CAPSULE, EXTENDED RELEASE ORAL at 23:23

## 2022-03-11 NOTE — ED PROVIDER NOTE - NS ED ROS FT
CONSTITUTIONAL: No fevers, chills, fatigue  ENT: No nasal congestion, runny nose, sore throat  CV: No chest pain  PULM: No cough, shortness of breath  GI: + abdominal distention, constipation. No nausea, vomiting, bloody stools  : No dysuria, polyuria, hematuria  SKIN: No rashes, swelling  MSK: No back pain, flank pain  NEURO: No headache  PSYCH: No SI hallucinations

## 2022-03-11 NOTE — ED PROVIDER NOTE - PROGRESS NOTE DETAILS
Ayush Noriega D.O., PGY3 (Resident)  Patient with abdominal distention, CT reading early small bowel obstruction vs ileus. Patient on immunotherapy, hx of lap alvina, left inguinal hernia repair. Has not passed gas in a few days, only able to tolerate some fluids. No comment on transition point in report or lack thereof. Discussed with surg. Will lorna. Ayush Noriega D.O., PGY3 (Resident)  Surgery does not feel patient has bowel obstruction. Patient only received CT with IV contrast. Cannot exclude adhesions clinically. Surg recs med admission with GI eval for ileus, bowel regimen. Discussed with hospitalist. Rec enema. Admit.

## 2022-03-11 NOTE — ED PROVIDER NOTE - ATTENDING CONTRIBUTION TO CARE
DR. ADKINS, ATTENDING MD-  I performed a face to face bedside interview with the patient regarding history of present illness, review of symptoms and past medical history. I completed an independent physical exam.  I have discussed the patient's plan of care with the resident.   Documentation as above in the note.    80 y/o male h/o bladder ca, kidney stent, copd on 2l nc here with dec bm and abd pain in setting of recent uti.  Eval for sbo lbo ileus mets uti obtain cbc cmp ua ucx ct a/p covid swab likely admission for further care and evaluation.

## 2022-03-11 NOTE — ED ADULT TRIAGE NOTE - CHIEF COMPLAINT QUOTE
abd pain    abd pain and soft stools started approximately a week ago after starting immunotherapy (katuda) on feb 28 and change of abx (Linezolid) for uti that started in early february.   saw pmd and had cat scan showing large amount of stool.  started on senna, citrate of magnesium and lactulose but only had 1 bm today and abd is more distended.  denies nausea vomiting.  hx of bladder and lung ca, copd, kidney stents.  on home o2 at 3l via nc

## 2022-03-11 NOTE — ED ADULT NURSE NOTE - OBJECTIVE STATEMENT
pt received to room 9, a&ox4, ambulatory, pmh of COPD, Bladder and Lung on chemo p/w Abdominal pain. pt. endorses recent UTI, in which he completed the dose of medications. pt. endorses not having a formed BM for a couple of weeks, and feeling constipated. pt. abdomen distended. tender upon palpation. last BM was this morning,but not formed. prescribed laxatives yesterday and started last night. Pt breathing even and unlabored on supplemental O2 at 3L. Denies fever, chills, cough, SOB, chest pain, palpitations, dizziness, N/V/D, numbness, tingling. 20g IV placed in the L AC. Labs collected and sent. pending CT.  Stretcher locked in lowest position with siderails up x2. Call bell and personal items within reach.

## 2022-03-11 NOTE — ED PROVIDER NOTE - OBJECTIVE STATEMENT
81M COPD (on home O2 @ 4L NC), Pneumothorax (1/22), Vit D deficiency, pHTN (not on any meds), Bladder Ca s/p recently starting immunotherapy in februrary here for inability to stool x 2 days, prior had small volume loose stools. + worsening abd distention. Tolerating liquids, not food. Denies vomiting. Able to urinate w/o dysuria, oliguria. Tried senna, miralax, mag citrate, lactulose w/o sig improvement. Cant remember last passing gas. Denies fever, chest pain, shortness of breath. Patient saw oncologist who does not think sxs 2/2 immunotherapy. Of note, patient with recent uti, tx with unknown abxs -> not improved, changed in linezolid.

## 2022-03-11 NOTE — ED PROVIDER NOTE - PHYSICAL EXAMINATION
GENERAL: elderly male, lying in bed, appears uncomfortable. On 3L NC (baseline, home amount). Vital signs are within normal limits  EYES: Conjunctiva noninjected or pale, sclera anicteric  HENT: NC/AT, moist mucous membranes  NECK: Supple, trachea midline  LUNG: Nonlabored respirations, no wheezes, rales  CV: RRR, Pulses- Radial: 2+ bilateral and equal  ABDOMEN: +distended, mild discomfort diffusely, tympanic, + bowel sounds upper quadrants, not lower.   MSK: No visible deformities, nontender extremities  SKIN: No rashes, bruises  NEURO: AAOx4 (to person, place, time, event), no tremor, steady gait  PSYCH: Normal mood and affect

## 2022-03-11 NOTE — ED PROVIDER NOTE - CLINICAL SUMMARY MEDICAL DECISION MAKING FREE TEXT BOX
81M COPD (on home O2 @ 4L NC), Pneumothorax (1/22), Vit D deficiency, pHTN (not on any meds), Bladder Ca, renal stents, s/p recently starting immunotherapy in februrary, recently on linezolid for a presumed uti (stopped 4 days ago), here with abd distention, constipation. Hemodynamically stable, decreased PO. Tympanic abdomen, not rigid, not peritoneal. Given onc hx but no diarrhea, low suspicion for c-diff, magacolon. Patient with recent CTs showing ascites but family does not know this. S/p aggressive bowel regimen. Eval for bowel obstruction vs stercoral colitis. Do not suspect abd compartment syndrome at present time. Check labs including lipase, CT a/p with oral and IV contrast, give analgesia, empiric hydration. 81M COPD (on home O2 @ 4L NC), Pneumothorax (1/22), Vit D deficiency, pHTN (not on any meds), Bladder Ca, renal stents, s/p recently starting immunotherapy in februrary, recently on linezolid for a presumed uti (stopped 4 days ago), here with abd distention, constipation. Hemodynamically stable, decreased PO. Tympanic abdomen, not rigid, not peritoneal. Given onc hx but no diarrhea, low suspicion for c-diff, magacolon. Patient with recent CTs showing ascites but family does not know this. S/p aggressive bowel regimen. Eval for bowel obstruction vs stercoral colitis. Do not suspect abd compartment syndrome at present time. Check labs including lipase, CT a/p with IV contrast, give analgesia, empiric hydration. No

## 2022-03-12 ENCOUNTER — TRANSCRIPTION ENCOUNTER (OUTPATIENT)
Age: 82
End: 2022-03-12

## 2022-03-12 DIAGNOSIS — K56.600 PARTIAL INTESTINAL OBSTRUCTION, UNSPECIFIED AS TO CAUSE: ICD-10-CM

## 2022-03-12 DIAGNOSIS — C67.9 MALIGNANT NEOPLASM OF BLADDER, UNSPECIFIED: ICD-10-CM

## 2022-03-12 DIAGNOSIS — Z29.9 ENCOUNTER FOR PROPHYLACTIC MEASURES, UNSPECIFIED: ICD-10-CM

## 2022-03-12 DIAGNOSIS — N13.30 UNSPECIFIED HYDRONEPHROSIS: ICD-10-CM

## 2022-03-12 DIAGNOSIS — J43.9 EMPHYSEMA, UNSPECIFIED: ICD-10-CM

## 2022-03-12 DIAGNOSIS — E78.5 HYPERLIPIDEMIA, UNSPECIFIED: ICD-10-CM

## 2022-03-12 DIAGNOSIS — I10 ESSENTIAL (PRIMARY) HYPERTENSION: ICD-10-CM

## 2022-03-12 DIAGNOSIS — R14.0 ABDOMINAL DISTENSION (GASEOUS): ICD-10-CM

## 2022-03-12 LAB
ALBUMIN SERPL ELPH-MCNC: 3.3 G/DL — SIGNIFICANT CHANGE UP (ref 3.3–5)
ALBUMIN SERPL ELPH-MCNC: 3.4 G/DL — SIGNIFICANT CHANGE UP (ref 3.3–5)
ALP SERPL-CCNC: 68 U/L — SIGNIFICANT CHANGE UP (ref 40–120)
ALP SERPL-CCNC: 72 U/L — SIGNIFICANT CHANGE UP (ref 40–120)
ALT FLD-CCNC: 61 U/L — HIGH (ref 4–41)
ALT FLD-CCNC: 66 U/L — HIGH (ref 4–41)
ANION GAP SERPL CALC-SCNC: 12 MMOL/L — SIGNIFICANT CHANGE UP (ref 7–14)
ANION GAP SERPL CALC-SCNC: 14 MMOL/L — SIGNIFICANT CHANGE UP (ref 7–14)
APPEARANCE UR: ABNORMAL
APTT BLD: 30.7 SEC — SIGNIFICANT CHANGE UP (ref 27–36.3)
AST SERPL-CCNC: 79 U/L — HIGH (ref 4–40)
AST SERPL-CCNC: 86 U/L — HIGH (ref 4–40)
BASOPHILS # BLD AUTO: 0.02 K/UL — SIGNIFICANT CHANGE UP (ref 0–0.2)
BASOPHILS NFR BLD AUTO: 0.3 % — SIGNIFICANT CHANGE UP (ref 0–2)
BILIRUB SERPL-MCNC: 0.3 MG/DL — SIGNIFICANT CHANGE UP (ref 0.2–1.2)
BILIRUB SERPL-MCNC: 0.3 MG/DL — SIGNIFICANT CHANGE UP (ref 0.2–1.2)
BILIRUB UR-MCNC: NEGATIVE — SIGNIFICANT CHANGE UP
BUN SERPL-MCNC: 27 MG/DL — HIGH (ref 7–23)
BUN SERPL-MCNC: 30 MG/DL — HIGH (ref 7–23)
CALCIUM SERPL-MCNC: 9.4 MG/DL — SIGNIFICANT CHANGE UP (ref 8.4–10.5)
CALCIUM SERPL-MCNC: 9.4 MG/DL — SIGNIFICANT CHANGE UP (ref 8.4–10.5)
CHLORIDE SERPL-SCNC: 104 MMOL/L — SIGNIFICANT CHANGE UP (ref 98–107)
CHLORIDE SERPL-SCNC: 105 MMOL/L — SIGNIFICANT CHANGE UP (ref 98–107)
CO2 SERPL-SCNC: 26 MMOL/L — SIGNIFICANT CHANGE UP (ref 22–31)
CO2 SERPL-SCNC: 28 MMOL/L — SIGNIFICANT CHANGE UP (ref 22–31)
COLOR SPEC: YELLOW — SIGNIFICANT CHANGE UP
CREAT SERPL-MCNC: 1.72 MG/DL — HIGH (ref 0.5–1.3)
CREAT SERPL-MCNC: 1.92 MG/DL — HIGH (ref 0.5–1.3)
DIFF PNL FLD: ABNORMAL
EGFR: 35 ML/MIN/1.73M2 — LOW
EGFR: 40 ML/MIN/1.73M2 — LOW
EOSINOPHIL # BLD AUTO: 0.01 K/UL — SIGNIFICANT CHANGE UP (ref 0–0.5)
EOSINOPHIL NFR BLD AUTO: 0.2 % — SIGNIFICANT CHANGE UP (ref 0–6)
FLUAV AG NPH QL: SIGNIFICANT CHANGE UP
FLUBV AG NPH QL: SIGNIFICANT CHANGE UP
GLUCOSE SERPL-MCNC: 101 MG/DL — HIGH (ref 70–99)
GLUCOSE SERPL-MCNC: 131 MG/DL — HIGH (ref 70–99)
GLUCOSE UR QL: NEGATIVE — SIGNIFICANT CHANGE UP
HCT VFR BLD CALC: 38.2 % — LOW (ref 39–50)
HGB BLD-MCNC: 12.1 G/DL — LOW (ref 13–17)
IANC: 3.95 K/UL — SIGNIFICANT CHANGE UP (ref 1.5–8.5)
IMM GRANULOCYTES NFR BLD AUTO: 0.2 % — SIGNIFICANT CHANGE UP (ref 0–1.5)
INR BLD: 1.14 RATIO — SIGNIFICANT CHANGE UP (ref 0.88–1.16)
KETONES UR-MCNC: NEGATIVE — SIGNIFICANT CHANGE UP
LEUKOCYTE ESTERASE UR-ACNC: ABNORMAL
LIDOCAIN IGE QN: 13 U/L — SIGNIFICANT CHANGE UP (ref 7–60)
LYMPHOCYTES # BLD AUTO: 1.15 K/UL — SIGNIFICANT CHANGE UP (ref 1–3.3)
LYMPHOCYTES # BLD AUTO: 18.4 % — SIGNIFICANT CHANGE UP (ref 13–44)
MAGNESIUM SERPL-MCNC: 3.8 MG/DL — HIGH (ref 1.6–2.6)
MCHC RBC-ENTMCNC: 29.2 PG — SIGNIFICANT CHANGE UP (ref 27–34)
MCHC RBC-ENTMCNC: 31.7 GM/DL — LOW (ref 32–36)
MCV RBC AUTO: 92 FL — SIGNIFICANT CHANGE UP (ref 80–100)
MONOCYTES # BLD AUTO: 1.11 K/UL — HIGH (ref 0–0.9)
MONOCYTES NFR BLD AUTO: 17.8 % — HIGH (ref 2–14)
NEUTROPHILS # BLD AUTO: 3.95 K/UL — SIGNIFICANT CHANGE UP (ref 1.8–7.4)
NEUTROPHILS NFR BLD AUTO: 63.1 % — SIGNIFICANT CHANGE UP (ref 43–77)
NITRITE UR-MCNC: NEGATIVE — SIGNIFICANT CHANGE UP
NRBC # BLD: 0 /100 WBCS — SIGNIFICANT CHANGE UP
NRBC # FLD: 0.03 K/UL — HIGH
PH UR: 6 — SIGNIFICANT CHANGE UP (ref 5–8)
PHOSPHATE SERPL-MCNC: 3.9 MG/DL — SIGNIFICANT CHANGE UP (ref 2.5–4.5)
PLATELET # BLD AUTO: 308 K/UL — SIGNIFICANT CHANGE UP (ref 150–400)
POTASSIUM SERPL-MCNC: 5 MMOL/L — SIGNIFICANT CHANGE UP (ref 3.5–5.3)
POTASSIUM SERPL-MCNC: 5 MMOL/L — SIGNIFICANT CHANGE UP (ref 3.5–5.3)
POTASSIUM SERPL-SCNC: 5 MMOL/L — SIGNIFICANT CHANGE UP (ref 3.5–5.3)
POTASSIUM SERPL-SCNC: 5 MMOL/L — SIGNIFICANT CHANGE UP (ref 3.5–5.3)
PROT SERPL-MCNC: 7.5 G/DL — SIGNIFICANT CHANGE UP (ref 6–8.3)
PROT SERPL-MCNC: 7.5 G/DL — SIGNIFICANT CHANGE UP (ref 6–8.3)
PROT UR-MCNC: ABNORMAL
PROTHROM AB SERPL-ACNC: 13.3 SEC — SIGNIFICANT CHANGE UP (ref 10.5–13.4)
RBC # BLD: 4.15 M/UL — LOW (ref 4.2–5.8)
RBC # FLD: 17.8 % — HIGH (ref 10.3–14.5)
RSV RNA NPH QL NAA+NON-PROBE: SIGNIFICANT CHANGE UP
SARS-COV-2 RNA SPEC QL NAA+PROBE: SIGNIFICANT CHANGE UP
SODIUM SERPL-SCNC: 142 MMOL/L — SIGNIFICANT CHANGE UP (ref 135–145)
SODIUM SERPL-SCNC: 147 MMOL/L — HIGH (ref 135–145)
SP GR SPEC: 1.01 — SIGNIFICANT CHANGE UP (ref 1–1.05)
UROBILINOGEN FLD QL: SIGNIFICANT CHANGE UP
WBC # BLD: 6.25 K/UL — SIGNIFICANT CHANGE UP (ref 3.8–10.5)
WBC # FLD AUTO: 6.25 K/UL — SIGNIFICANT CHANGE UP (ref 3.8–10.5)

## 2022-03-12 PROCEDURE — 99223 1ST HOSP IP/OBS HIGH 75: CPT | Mod: GC

## 2022-03-12 PROCEDURE — 99223 1ST HOSP IP/OBS HIGH 75: CPT

## 2022-03-12 PROCEDURE — 71045 X-RAY EXAM CHEST 1 VIEW: CPT | Mod: 26

## 2022-03-12 PROCEDURE — 74018 RADEX ABDOMEN 1 VIEW: CPT | Mod: 26

## 2022-03-12 PROCEDURE — 74177 CT ABD & PELVIS W/CONTRAST: CPT | Mod: 26,MA

## 2022-03-12 RX ORDER — HEPARIN SODIUM 5000 [USP'U]/ML
5000 INJECTION INTRAVENOUS; SUBCUTANEOUS EVERY 8 HOURS
Refills: 0 | Status: DISCONTINUED | OUTPATIENT
Start: 2022-03-12 | End: 2022-03-14

## 2022-03-12 RX ORDER — ALBUTEROL 90 UG/1
2 AEROSOL, METERED ORAL EVERY 6 HOURS
Refills: 0 | Status: DISCONTINUED | OUTPATIENT
Start: 2022-03-12 | End: 2022-03-18

## 2022-03-12 RX ORDER — BUDESONIDE AND FORMOTEROL FUMARATE DIHYDRATE 160; 4.5 UG/1; UG/1
2 AEROSOL RESPIRATORY (INHALATION)
Refills: 0 | Status: DISCONTINUED | OUTPATIENT
Start: 2022-03-12 | End: 2022-03-18

## 2022-03-12 RX ORDER — CHOLECALCIFEROL (VITAMIN D3) 125 MCG
2000 CAPSULE ORAL DAILY
Refills: 0 | Status: DISCONTINUED | OUTPATIENT
Start: 2022-03-12 | End: 2022-03-18

## 2022-03-12 RX ORDER — PREGABALIN 225 MG/1
1000 CAPSULE ORAL DAILY
Refills: 0 | Status: DISCONTINUED | OUTPATIENT
Start: 2022-03-12 | End: 2022-03-18

## 2022-03-12 RX ORDER — ACETAMINOPHEN 500 MG
1000 TABLET ORAL ONCE
Refills: 0 | Status: COMPLETED | OUTPATIENT
Start: 2022-03-12 | End: 2022-03-12

## 2022-03-12 RX ORDER — SODIUM CHLORIDE 9 MG/ML
1000 INJECTION INTRAMUSCULAR; INTRAVENOUS; SUBCUTANEOUS
Refills: 0 | Status: DISCONTINUED | OUTPATIENT
Start: 2022-03-12 | End: 2022-03-14

## 2022-03-12 RX ORDER — AMLODIPINE BESYLATE 2.5 MG/1
5 TABLET ORAL DAILY
Refills: 0 | Status: DISCONTINUED | OUTPATIENT
Start: 2022-03-12 | End: 2022-03-18

## 2022-03-12 RX ORDER — TIOTROPIUM BROMIDE 18 UG/1
1 CAPSULE ORAL; RESPIRATORY (INHALATION) DAILY
Refills: 0 | Status: DISCONTINUED | OUTPATIENT
Start: 2022-03-12 | End: 2022-03-18

## 2022-03-12 RX ORDER — TAMSULOSIN HYDROCHLORIDE 0.4 MG/1
0.4 CAPSULE ORAL AT BEDTIME
Refills: 0 | Status: DISCONTINUED | OUTPATIENT
Start: 2022-03-12 | End: 2022-03-18

## 2022-03-12 RX ORDER — PANTOPRAZOLE SODIUM 20 MG/1
40 TABLET, DELAYED RELEASE ORAL
Refills: 0 | Status: DISCONTINUED | OUTPATIENT
Start: 2022-03-12 | End: 2022-03-13

## 2022-03-12 RX ADMIN — HEPARIN SODIUM 5000 UNIT(S): 5000 INJECTION INTRAVENOUS; SUBCUTANEOUS at 14:21

## 2022-03-12 RX ADMIN — HEPARIN SODIUM 5000 UNIT(S): 5000 INJECTION INTRAVENOUS; SUBCUTANEOUS at 21:12

## 2022-03-12 RX ADMIN — BUDESONIDE AND FORMOTEROL FUMARATE DIHYDRATE 2 PUFF(S): 160; 4.5 AEROSOL RESPIRATORY (INHALATION) at 10:15

## 2022-03-12 RX ADMIN — TIOTROPIUM BROMIDE 1 CAPSULE(S): 18 CAPSULE ORAL; RESPIRATORY (INHALATION) at 10:15

## 2022-03-12 RX ADMIN — Medication 400 MILLIGRAM(S): at 03:37

## 2022-03-12 RX ADMIN — BUDESONIDE AND FORMOTEROL FUMARATE DIHYDRATE 2 PUFF(S): 160; 4.5 AEROSOL RESPIRATORY (INHALATION) at 21:11

## 2022-03-12 RX ADMIN — SODIUM CHLORIDE 75 MILLILITER(S): 9 INJECTION INTRAMUSCULAR; INTRAVENOUS; SUBCUTANEOUS at 07:12

## 2022-03-12 RX ADMIN — Medication 1 ENEMA: at 05:15

## 2022-03-12 NOTE — H&P ADULT - PROBLEM SELECTOR PLAN 5
- s/p chemo (6 cycles of carboplatin + gemcitabine - completed 12/2021) and recently started on immunotherapy x 1 dose on 2/28/22  - nothing to do at this time

## 2022-03-12 NOTE — H&P ADULT - NSHPPHYSICALEXAM_GEN_ALL_CORE
Vital Signs Last 24 Hrs  T(C): 36.2 (12 Mar 2022 08:04), Max: 36.8 (11 Mar 2022 22:12)  T(F): 97.2 (12 Mar 2022 08:04), Max: 98.3 (11 Mar 2022 22:12)  HR: 85 (12 Mar 2022 08:04) (74 - 104)  BP: 115/65 (12 Mar 2022 08:04) (103/54 - 125/72)  BP(mean): --  RR: 16 (12 Mar 2022 08:04) (15 - 20)  SpO2: 100% (12 Mar 2022 08:04) (97% - 100%)
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
## 2022-03-12 NOTE — H&P ADULT - PROBLEM SELECTOR PLAN 4
- recent lipid profile done  - hold welchol (non-formulary) and slightly elevated lft's, monitor for now

## 2022-03-12 NOTE — H&P ADULT - ATTENDING COMMENTS
80 yo M COPD, Pneumothorax (1/22), Vit D deficiency, HTN, HLD, Bladder Ca unresectable s/p chemo and recently started on immunotherapy x 1 dose on 2/28/22 p/w abd pain. CT abdomen showing partial SBO; likely having bowel compression from bladder cancer. Seen by surgery, no surgical intervention as this time. GI consulted- NGT and rectal tube place. If no improvement, may need endoscopic decompression

## 2022-03-12 NOTE — CONSULT NOTE ADULT - ASSESSMENT
Impression:  # abd distention - dilated loops of small and large bowel, incompentent ileocecal valve. Dilation possibly 2/2 obstruction from pelvic mass (bladder Ca) vs ileus. NGT placed by surgery. Will place rectal tube, if no improvement in abdominal distention may require endoscopic decompression  # UTI  # hydronephrosis    Recommendations:  - NGT to low intermittent suction  - place rectal tube today  - NPO  - trend abdominal exam  - abx for UTI  - correct all electrolytes  - blood cultures  - rest of care per primary team    GI will continue to follow.     All recommendations are tentative until note is attested by attending.     Phil Adams, PGY5  Gastroenterology/Hepatology Fellow  Available on [employer] Teams  [phone] ([employer] Short Range Pager)  [phone] (Long Range Pager)    After 5pm, please contact the on-call GI fellow. [phone]

## 2022-03-12 NOTE — H&P ADULT - ASSESSMENT
82 y/o male, with a PmHx of COPD (on home O2 @ 3L NC), Pneumothorax (1/22), Vit D deficiency, HTN, HLD, Bladder Ca unresectable s/p chemo (6 cycles of carboplatin + gemcitabine - completed 12/2021) and recently started on immunotherapy x 1 dose on 2/28/22, Cholecystectomy, who presents to the [facility] ED with abd pain over the past month but had gotten worse over the past 2 days. Admitted to medicine for partial SBO 2/2 extraluminal compression of the sigmoid by the bladder cancer.

## 2022-03-12 NOTE — H&P ADULT - PROBLEM SELECTOR PLAN 1
WBC: 12.26        Glucose: 131          AST/ALT: 79/61            COVID neg  3/12 CT Abd/Pelvis - Diffusely dilated small bowel which may be related to ileus or early  obstruction. Moderate bilateral hydronephrosis, nephroureteral stents in place. Large calcified masses in the lung bases, unchanged.  - Colorectal sx c/s following - no acute surgical intervention, pt not completely obstructed  - NGT if pt becomes nauseous or vomits  - NPO, on iv fluids for now  - House GI c/s emailed  - s/p fleet enema by ED with minimal relief

## 2022-03-12 NOTE — ED ADULT NURSE REASSESSMENT NOTE - NS ED NURSE REASSESS COMMENT FT1
Report given to [facility] APRIL Lugo. pt. in NAD. respitations even and unlabored.
visualized pt. ambulating to the bathroom. steady gait noted. one soft BM produced. pt. flushed before RN could see. no acute distress. respirations even and unlabored.
pt. remains A&Ox4, awake and resting. pt. offers no new complaints at this time. no acute distress noted. respirations even and unlabored. Saline enema administered. pt. tolerated procedure. VS as noted.
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
## 2022-03-12 NOTE — H&P ADULT - NSICDXPASTMEDICALHX_GEN_ALL_CORE_FT
PAST MEDICAL HISTORY:  Bladder cancer last chemo 12/21    Emphysema of lung on home O2 @ 3lpm NC    History of pneumothorax 1/15/2022    Hyperlipidemia     Hypertension     Nodule of left lung monitored by MD , had CT and biopsy 7/2019  inconclusive    Vitamin D deficiency

## 2022-03-12 NOTE — H&P ADULT - NSHPOUTPATIENTPROVIDERS_GEN_ALL_CORE
PCP: Dr. Michelet Chu [phone]  Oncologist: Dr. Fabricio Willingham [phone]  Pulmonologist: Dr. Ramírez Aleman [phone]  Interventional Radiologist: Dr. Braden Ferreira [phone]

## 2022-03-12 NOTE — CHART NOTE - NSCHARTNOTEFT_GEN_A_CORE
Pt seen and examined at bedside. Denies any new complaints. Denies nausea or emesis. Says that he feels about the same level of bloated as prior. Says that he has had 1 episode of flatus but no bm. Denies pain. Vitals wnl but on face tent. NAD, abd distended, nttp, soft, tympanic to percussion, no g/r. NGT in place w/same 200ml of clear output as earlier in day. Had adjusted NGT after 1st and 2nd xrays to correct position. Flushed NGT. Rectal tube w/o output. Spoke w/RN to page surgery if pt deteriorates and if no further output in 2hrs. Will follow.    JOSUE Bonds, PGY-1  A Team  [id number]

## 2022-03-12 NOTE — H&P ADULT - PROBLEM SELECTOR PLAN 2
- on home O2 @ 3 LPM NC  - monitor pulse ox  - cont home meds (advair, albuterol, incruse elipita) ==> change to whats on formulary

## 2022-03-12 NOTE — H&P ADULT - NSHPSOCIALHISTORY_GEN_ALL_CORE
Marital Status:     Occupation: Retired     Tobacco Use: Currently smokes about 1 pk per 3 weeks x 60 years    ETOH Use: denies - last alcohol use was 2 years ago    Drug Use: cristina    Flu Vaccine:   12/2021              Pneumonia Vaccine:    denies                  COVID Vaccine: Completed Pfizer vaccine and had a booster 12/2021

## 2022-03-12 NOTE — PATIENT PROFILE ADULT - FALL HARM RISK - HARM RISK INTERVENTIONS

## 2022-03-12 NOTE — CHART NOTE - NSCHARTNOTEFT_GEN_A_CORE
Notified by RN of patient desatting on 5L face tent Sp02 80's.  Patient was o2 increased to 15L face tent Sp02 96%      Patient is a 80 y/o male, with a PmHx of COPD (on home O2 @ 3L NC), Pneumothorax (1/22), Vit D deficiency, HTN, HLD, Bladder Ca unresectable s/p chemo (6 cycles of carboplatin + gemcitabine - completed 12/2021) and recently started on immunotherapy x 1 dose on 2/28/22, Cholecystectomy admitted for abd distention. Imaging showing dilated loops of small and large bowel, incompentent ileocecal valve. Patient is s/p NGT to intermittent suction. At bedside denies any complaints of shortness of breath, chest pain, abdominal, N/V. CXR ordered- prelim no changes. Discussed with RN to wean 02 as tolerated. Monitor on continuous pulse ox. Notified by RN of patient desatting on 5L face tent Sp02 80's.  Patient's oxygen requirement increased to 15L Face tent and Sp02 improved to 96%      Patient is a 80 y/o male, with a PmHx of COPD (on home O2 @ 3L NC), Pneumothorax (1/22), Vit D deficiency, HTN, HLD, Bladder Ca unresectable s/p chemo (6 cycles of carboplatin + gemcitabine - completed 12/2021) and recently started on immunotherapy x 1 dose on 2/28/22, Cholecystectomy admitted for abd distention. Imaging showing dilated loops of small and large bowel, incompetent ileocecal valve. Patient is s/p NGT to intermittent suction. At bedside denies any complaints of shortness of breath, chest pain, abdominal pain, N/V. CXR ordered- prelim no changes. Discussed with RN to wean 02 as tolerated. Monitor on continuous pulse ox.      Addendum: Patient desatt to 80's on 5L via face tent due to acute on chronic hypoxic respiratory failure, Sp02 improved with 15L via face tent Notified by RN of patient desatting on 5L face tent Sp02 80's.  Patient's oxygen requirement increased to 15L Face tent and Sp02 improved to 96%      Patient is a 82 y/o male, with a PmHx of COPD (on home O2 @ 3L NC), Pneumothorax (1/22), Vit D deficiency, HTN, HLD, Bladder Ca unresectable s/p chemo (6 cycles of carboplatin + gemcitabine - completed 12/2021) and recently started on immunotherapy x 1 dose on 2/28/22, Cholecystectomy admitted for abd distention. Imaging showing dilated loops of small and large bowel, incompetent ileocecal valve. Patient is s/p NGT to intermittent suction. At bedside denies any complaints of shortness of breath, chest pain, abdominal pain, N/V. CXR ordered- prelim no changes. Discussed with RN to wean 02 as tolerated. Monitor on continuous pulse ox.

## 2022-03-12 NOTE — CONSULT NOTE ADULT - ASSESSMENT
81 year old male with history of COPD (on home O2 @ 4L NC), Pneumothorax (1/22), Vit D deficiency, HTN (not on any meds), Bladder Ca unresectable s/p chemo and recently started on immunotherapy who presents with partial LBO 2/2 extraluminal compression of the sigmoid by the bladder cancer.     PLAN:   - No acute surgical intervention, patient not completely obstructed   - Only surgical intervention for this patient would be diverting colostomy, but patient is a poor surgical candidate due to respiratory status   - NGT if patient become nauseous or vomits  - Final plan pending discussion with Fellow.       Juan Alberto Pantoja MD, PGY 3  A Team Surgery  [id number]   81 year old male with history of COPD (on home O2 @ 4L NC), Pneumothorax (1/22), Vit D deficiency, HTN (not on any meds), Bladder Ca unresectable s/p chemo and recently started on immunotherapy who presents with partial LBO 2/2 extraluminal compression of the sigmoid by the bladder cancer. Oral contrast seen at the rectum and patient is having stool.     PLAN:   - No acute surgical intervention, patient not completely obstructed   - Only surgical intervention for this patient would be diverting colostomy, but patient is a poor surgical candidate due to respiratory status   - NGT if patient become nauseous or vomits  - Final plan pending discussion with Fellow.       Juan Alberto Pantoja MD, PGY 3  A Team Surgery  [id number]   81 year old male with history of COPD (on home O2 @ 4L NC), Pneumothorax (1/22), Vit D deficiency, HTN (not on any meds), Bladder Ca unresectable s/p chemo and recently started on immunotherapy who presents with partial LBO 2/2 extraluminal compression of the sigmoid by the bladder cancer. Oral contrast seen at the rectum.    PLAN:   - No acute surgical intervention, patient not completely obstructed   - Only surgical intervention for this patient would be diverting colostomy, but patient is a poor surgical candidate due to respiratory status   - NGT if patient become nauseous or vomits  - Final plan pending discussion with Fellow.       Juan Alberto Pantoja MD, PGY 3  A Team Surgery  [id number]   81 year old male with history of COPD (on home O2 @ 4L NC), Pneumothorax (1/22), Vit D deficiency, HTN (not on any meds), Bladder Ca unresectable s/p chemo and recently started on immunotherapy who presents with partial LBO 2/2 extraluminal compression of the sigmoid by the bladder cancer. Oral contrast seen at the rectum.    PLAN:   - No acute surgical intervention, patient not completely obstructed   - Only surgical intervention for this patient would be diverting colostomy, but patient is a poor surgical candidate due to respiratory status  - GI consult for palliative stent placement    - NGT if patient become nauseous or vomits  - Plan discussed with Fellow Dr. Alo Pantoja MD, PGY 3  A Team Surgery  [id number]

## 2022-03-12 NOTE — H&P ADULT - HISTORY OF PRESENT ILLNESS
80 y/o male, with a PmHx of COPD (on home O2 @ 3L NC), Pneumothorax (1/22), Vit D deficiency, HTN, HLD, Bladder Ca unresectable s/p chemo (6 cycles of carboplatin + gemcitabine - completed 12/2021) and recently started on immunotherapy x 1 dose on 2/28/22, Cholecystectomy, who presents to the [facility] ED with abd pain over the past month but had gotten worse over the past 2 days. Pt was recently treated at [facility] for a pneumothorax (1/15/22) and a UTI by PCP on 3/3/22 with Linezolid. Pt states he has been having worsening abd pain over the past month (lower abdominal area) but over the last 2 days states his stomach became distended and hasn't been able to have a BM. States last BM was 3 hrs prior to coming to the ED but only had minimal amount of stool that came out. pt states he had tried taking senna, miralax, lactulose without relief. Last time he passed gas was on Tuesday or Wednesday of this past week. Denies any fever, chills, chest pain, HA, dizziness, blurred vision, nausea or vomiting. Pt states the pain is mild 2-3/10 but is very distended making him feel uncomfortable. Pt states he is also having trouble urinating in which he doesn't always feel if his bladder is full and has trouble urinating when does void. In the [facility] ED today, he had a CT abd/Pelvis done that showed a partial SBO 2/2 extraluminal compression of the sigmoid by the bladder cancer. He was seen by colorectal surgery and no surgical intervention was recommended at this time. He appears comfortable at this time and is now being admitted to medicine for further medical management and treatment of the partial sbo and mild-moderate hydronephrosis.

## 2022-03-12 NOTE — H&P ADULT - NEGATIVE ENMT SYMPTOMS
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 [employer] MEDICARE SOLUTION  2. N/A    Date of Onset: June 2017   Diagnosis Precautions: avoid most modalities without physician approval due to history of cancer and currently undergoing treatment  Chart reviewed: Relevant co-morbidities, allergies, tests and medications: gastroesophageal adenocarcinoma with brain metastasis, history of right hip fracture due to fall    SUBJECTIVE   Current Pain: 7/10. Current Condition: Patient reports no major changes, but that he continues to have significantly increased pain levels that limit both his exercise and activity tolerance at home. States that he is very much looking forward to trying \"electro therapy.\"  Functional Change: None at this time.    OBJECTIVE   Patient with severely rigid static and dynamic posture. Whole body muscle guarding with ambulation into clinic. Moderately improved ambulation quality when exiting clinic.    Treatment   Therapeutic Exercise:   Seated elliptical, level 1, 5 minutes, upper extremities and lower extremities (used because NuStep was unavailable)  Standing hamstring stretch at stairs, 2 x 30 seconds, bilateral  Standing gastroc stretch at slant board, 2 x 30 seconds, bilateral  Repeated motions: spinal flexion, 2 x 10 - performed at start and end of session  Single knee to chest, 1 x 5, bilateral - cueing for technique    Therapeutic Activity:  Education and active practice with transverse abdominis activation in supine. Education and active practice with diaphragmatic breathing at rest and with transverse abdominis activation in supine. Instruction about  intent, parameters, and technique associated with abdominal progression. Education about TENS including intent, how it works, and precautions. Patient verbalized understanding of all instruction.     Modalities:  Patient has been made aware of potential contraindications and possible risks associated with the use of the following modalities and has agreed.  Attended E-stim/TENS ([id number]):  Education on 1. donning, doffing, and use of TENS to the location of low back to decrease pain; 2. proper use and care of electrodes; 3. instruction and demonstration on how to adjust the pulse duration, frequency, and amplitude. The mode chosen was Conventional TENS: pulse duration<150 ms(80-100ms), frequency >80 Hz, amplitude comfortable, and below motor threshold for CHRONIC. Patient demonstrated proper knowledge and use of TENS unit.      Modality treatment resulted in decreased pain. Patient reports no adverse reaction to treatment.     Current Home Program (not performed this date except as noted above):   Hamstring stretch  Gastrocnemius stretch  Repeated motions: flexion  Single knee to chest  Opposite knee to shoulder in supine  Transverse abdominis isometrics  Diaphragmatic breathing    ASSESSMENT   Patient demonstrates low activity tolerance and requires frequent cueing for proper application of exercises because of compensations due to back pain. TENS was performed on this date with confirmation of modality safety directly from patient's primary oncologist. Dr. El MD confirmed that electrical stimulation could be used if necessary for pain management. Patient was educated that this modality is typically avoided with a diagnosis of cancer. He was informed about risks associated with use of electrical stimulation but was told that Dr. Gallegos confirmed the use of this modality to decreased pain and increase quality of life. He verbalized understanding of all education and gave informed consent prior to use of modality.  Patient tolerated TENS very well on this date with complete elimination of back pain upon exiting clinic. Continued therapy to progress goals.     Pain after treatment: 0/10  Result of above outlined education: Verbalizes understanding and Demonstrates understanding    Goals:       See initial evaluation for goals.     PLAN   Assess response to TENS  Repeat application if necessary  Progress transverse abdominis activation as able     THERAPY DAILY BILLING   Primary Insurance: [facility] MEDICARE SOLUTION  Secondary Insurance: N/A    Evaluation Procedures:  No evaluation codes were used on this date of service    Timed Procedures:  Therapeutic Activity, 15 minutes  Therapeutic Exercise, 15 minutes  Attended Electrostimulation, 15 minutes    Untimed Procedures:  No untimed codes were used on this date of service    Total Treatment Time: 45 minutes    The referring provider's electronic or written signature on the evaluation authorizes the therapy plan of care and certifies the need for these services, furnished under this plan of care while under their care.  Physician Signature on file.      no sinus symptoms/no nasal congestion/no nasal discharge/no nasal obstruction

## 2022-03-13 LAB
ALBUMIN SERPL ELPH-MCNC: 3.1 G/DL — LOW (ref 3.3–5)
ALP SERPL-CCNC: 68 U/L — SIGNIFICANT CHANGE UP (ref 40–120)
ALT FLD-CCNC: 61 U/L — HIGH (ref 4–41)
ANION GAP SERPL CALC-SCNC: 10 MMOL/L — SIGNIFICANT CHANGE UP (ref 7–14)
ANION GAP SERPL CALC-SCNC: 12 MMOL/L — SIGNIFICANT CHANGE UP (ref 7–14)
ANION GAP SERPL CALC-SCNC: 12 MMOL/L — SIGNIFICANT CHANGE UP (ref 7–14)
ANION GAP SERPL CALC-SCNC: 14 MMOL/L — SIGNIFICANT CHANGE UP (ref 7–14)
AST SERPL-CCNC: 68 U/L — HIGH (ref 4–40)
BASOPHILS # BLD AUTO: 0.02 K/UL — SIGNIFICANT CHANGE UP (ref 0–0.2)
BASOPHILS NFR BLD AUTO: 0.3 % — SIGNIFICANT CHANGE UP (ref 0–2)
BILIRUB SERPL-MCNC: 0.2 MG/DL — SIGNIFICANT CHANGE UP (ref 0.2–1.2)
BLD GP AB SCN SERPL QL: NEGATIVE — SIGNIFICANT CHANGE UP
BUN SERPL-MCNC: 28 MG/DL — HIGH (ref 7–23)
CALCIUM SERPL-MCNC: 8.4 MG/DL — SIGNIFICANT CHANGE UP (ref 8.4–10.5)
CALCIUM SERPL-MCNC: 8.7 MG/DL — SIGNIFICANT CHANGE UP (ref 8.4–10.5)
CALCIUM SERPL-MCNC: 8.7 MG/DL — SIGNIFICANT CHANGE UP (ref 8.4–10.5)
CALCIUM SERPL-MCNC: 8.9 MG/DL — SIGNIFICANT CHANGE UP (ref 8.4–10.5)
CHLORIDE SERPL-SCNC: 105 MMOL/L — SIGNIFICANT CHANGE UP (ref 98–107)
CHLORIDE SERPL-SCNC: 107 MMOL/L — SIGNIFICANT CHANGE UP (ref 98–107)
CHLORIDE SERPL-SCNC: 107 MMOL/L — SIGNIFICANT CHANGE UP (ref 98–107)
CHLORIDE SERPL-SCNC: 109 MMOL/L — HIGH (ref 98–107)
CO2 SERPL-SCNC: 24 MMOL/L — SIGNIFICANT CHANGE UP (ref 22–31)
CO2 SERPL-SCNC: 26 MMOL/L — SIGNIFICANT CHANGE UP (ref 22–31)
CO2 SERPL-SCNC: 27 MMOL/L — SIGNIFICANT CHANGE UP (ref 22–31)
CO2 SERPL-SCNC: 28 MMOL/L — SIGNIFICANT CHANGE UP (ref 22–31)
CREAT SERPL-MCNC: 1.46 MG/DL — HIGH (ref 0.5–1.3)
CREAT SERPL-MCNC: 1.51 MG/DL — HIGH (ref 0.5–1.3)
CREAT SERPL-MCNC: 1.51 MG/DL — HIGH (ref 0.5–1.3)
CREAT SERPL-MCNC: 1.54 MG/DL — HIGH (ref 0.5–1.3)
EGFR: 45 ML/MIN/1.73M2 — LOW
EGFR: 46 ML/MIN/1.73M2 — LOW
EGFR: 46 ML/MIN/1.73M2 — LOW
EGFR: 48 ML/MIN/1.73M2 — LOW
EOSINOPHIL # BLD AUTO: 0.18 K/UL — SIGNIFICANT CHANGE UP (ref 0–0.5)
EOSINOPHIL NFR BLD AUTO: 2.6 % — SIGNIFICANT CHANGE UP (ref 0–6)
GLUCOSE SERPL-MCNC: 100 MG/DL — HIGH (ref 70–99)
GLUCOSE SERPL-MCNC: 101 MG/DL — HIGH (ref 70–99)
GLUCOSE SERPL-MCNC: 96 MG/DL — SIGNIFICANT CHANGE UP (ref 70–99)
GLUCOSE SERPL-MCNC: 99 MG/DL — SIGNIFICANT CHANGE UP (ref 70–99)
HCT VFR BLD CALC: 38.6 % — LOW (ref 39–50)
HCT VFR BLD CALC: 38.9 % — LOW (ref 39–50)
HGB BLD-MCNC: 11.6 G/DL — LOW (ref 13–17)
HGB BLD-MCNC: 11.7 G/DL — LOW (ref 13–17)
IANC: 4.65 K/UL — SIGNIFICANT CHANGE UP (ref 1.5–8.5)
IMM GRANULOCYTES NFR BLD AUTO: 0.4 % — SIGNIFICANT CHANGE UP (ref 0–1.5)
LACTATE SERPL-SCNC: 1.5 MMOL/L — SIGNIFICANT CHANGE UP (ref 0.5–2)
LYMPHOCYTES # BLD AUTO: 0.87 K/UL — LOW (ref 1–3.3)
LYMPHOCYTES # BLD AUTO: 12.8 % — LOW (ref 13–44)
MAGNESIUM SERPL-MCNC: 3.5 MG/DL — HIGH (ref 1.6–2.6)
MAGNESIUM SERPL-MCNC: 3.8 MG/DL — HIGH (ref 1.6–2.6)
MAGNESIUM SERPL-MCNC: 3.9 MG/DL — HIGH (ref 1.6–2.6)
MCHC RBC-ENTMCNC: 28.1 PG — SIGNIFICANT CHANGE UP (ref 27–34)
MCHC RBC-ENTMCNC: 28.8 PG — SIGNIFICANT CHANGE UP (ref 27–34)
MCHC RBC-ENTMCNC: 30.1 GM/DL — LOW (ref 32–36)
MCHC RBC-ENTMCNC: 30.1 GM/DL — LOW (ref 32–36)
MCV RBC AUTO: 93.5 FL — SIGNIFICANT CHANGE UP (ref 80–100)
MCV RBC AUTO: 95.8 FL — SIGNIFICANT CHANGE UP (ref 80–100)
MONOCYTES # BLD AUTO: 1.07 K/UL — HIGH (ref 0–0.9)
MONOCYTES NFR BLD AUTO: 15.7 % — HIGH (ref 2–14)
NEUTROPHILS # BLD AUTO: 4.65 K/UL — SIGNIFICANT CHANGE UP (ref 1.8–7.4)
NEUTROPHILS NFR BLD AUTO: 68.2 % — SIGNIFICANT CHANGE UP (ref 43–77)
NRBC # BLD: 0 /100 WBCS — SIGNIFICANT CHANGE UP
NRBC # BLD: 0 /100 WBCS — SIGNIFICANT CHANGE UP
NRBC # FLD: 0 K/UL — SIGNIFICANT CHANGE UP
NRBC # FLD: 0 K/UL — SIGNIFICANT CHANGE UP
PHOSPHATE SERPL-MCNC: 3.2 MG/DL — SIGNIFICANT CHANGE UP (ref 2.5–4.5)
PHOSPHATE SERPL-MCNC: 3.8 MG/DL — SIGNIFICANT CHANGE UP (ref 2.5–4.5)
PHOSPHATE SERPL-MCNC: 4.1 MG/DL — SIGNIFICANT CHANGE UP (ref 2.5–4.5)
PLATELET # BLD AUTO: 278 K/UL — SIGNIFICANT CHANGE UP (ref 150–400)
PLATELET # BLD AUTO: 295 K/UL — SIGNIFICANT CHANGE UP (ref 150–400)
POTASSIUM SERPL-MCNC: 5.1 MMOL/L — SIGNIFICANT CHANGE UP (ref 3.5–5.3)
POTASSIUM SERPL-MCNC: 5.3 MMOL/L — SIGNIFICANT CHANGE UP (ref 3.5–5.3)
POTASSIUM SERPL-MCNC: 5.5 MMOL/L — HIGH (ref 3.5–5.3)
POTASSIUM SERPL-MCNC: 6.1 MMOL/L — HIGH (ref 3.5–5.3)
POTASSIUM SERPL-SCNC: 5.1 MMOL/L — SIGNIFICANT CHANGE UP (ref 3.5–5.3)
POTASSIUM SERPL-SCNC: 5.3 MMOL/L — SIGNIFICANT CHANGE UP (ref 3.5–5.3)
POTASSIUM SERPL-SCNC: 5.5 MMOL/L — HIGH (ref 3.5–5.3)
POTASSIUM SERPL-SCNC: 6.1 MMOL/L — HIGH (ref 3.5–5.3)
PROT SERPL-MCNC: 7.2 G/DL — SIGNIFICANT CHANGE UP (ref 6–8.3)
RBC # BLD: 4.06 M/UL — LOW (ref 4.2–5.8)
RBC # BLD: 4.13 M/UL — LOW (ref 4.2–5.8)
RBC # FLD: 17.8 % — HIGH (ref 10.3–14.5)
RBC # FLD: 19 % — HIGH (ref 10.3–14.5)
RH IG SCN BLD-IMP: POSITIVE — SIGNIFICANT CHANGE UP
SODIUM SERPL-SCNC: 145 MMOL/L — SIGNIFICANT CHANGE UP (ref 135–145)
SODIUM SERPL-SCNC: 146 MMOL/L — HIGH (ref 135–145)
WBC # BLD: 6.82 K/UL — SIGNIFICANT CHANGE UP (ref 3.8–10.5)
WBC # BLD: 8.46 K/UL — SIGNIFICANT CHANGE UP (ref 3.8–10.5)
WBC # FLD AUTO: 6.82 K/UL — SIGNIFICANT CHANGE UP (ref 3.8–10.5)
WBC # FLD AUTO: 8.46 K/UL — SIGNIFICANT CHANGE UP (ref 3.8–10.5)

## 2022-03-13 PROCEDURE — 93010 ELECTROCARDIOGRAM REPORT: CPT

## 2022-03-13 PROCEDURE — 99221 1ST HOSP IP/OBS SF/LOW 40: CPT | Mod: 25,GC

## 2022-03-13 PROCEDURE — 71045 X-RAY EXAM CHEST 1 VIEW: CPT | Mod: 26

## 2022-03-13 PROCEDURE — 44320 COLOSTOMY: CPT

## 2022-03-13 PROCEDURE — 99232 SBSQ HOSP IP/OBS MODERATE 35: CPT | Mod: GC

## 2022-03-13 DEVICE — GWIRE STRT JAGWIRE 0.035IN X 450: Type: IMPLANTABLE DEVICE | Status: FUNCTIONAL

## 2022-03-13 RX ORDER — CALCIUM GLUCONATE 100 MG/ML
2 VIAL (ML) INTRAVENOUS ONCE
Refills: 0 | Status: COMPLETED | OUTPATIENT
Start: 2022-03-13 | End: 2022-03-13

## 2022-03-13 RX ORDER — OXYCODONE HYDROCHLORIDE 5 MG/1
2.5 TABLET ORAL EVERY 4 HOURS
Refills: 0 | Status: DISCONTINUED | OUTPATIENT
Start: 2022-03-13 | End: 2022-03-14

## 2022-03-13 RX ORDER — PANTOPRAZOLE SODIUM 20 MG/1
40 TABLET, DELAYED RELEASE ORAL DAILY
Refills: 0 | Status: DISCONTINUED | OUTPATIENT
Start: 2022-03-13 | End: 2022-03-14

## 2022-03-13 RX ORDER — INSULIN HUMAN 100 [IU]/ML
10 INJECTION, SOLUTION SUBCUTANEOUS ONCE
Refills: 0 | Status: COMPLETED | OUTPATIENT
Start: 2022-03-13 | End: 2022-03-13

## 2022-03-13 RX ORDER — FENTANYL CITRATE 50 UG/ML
100 INJECTION INTRAVENOUS ONCE
Refills: 0 | Status: DISCONTINUED | OUTPATIENT
Start: 2022-03-13 | End: 2022-03-13

## 2022-03-13 RX ORDER — MIDAZOLAM HYDROCHLORIDE 1 MG/ML
2 INJECTION, SOLUTION INTRAMUSCULAR; INTRAVENOUS ONCE
Refills: 0 | Status: DISCONTINUED | OUTPATIENT
Start: 2022-03-13 | End: 2022-03-13

## 2022-03-13 RX ORDER — FENTANYL CITRATE 50 UG/ML
50 INJECTION INTRAVENOUS ONCE
Refills: 0 | Status: DISCONTINUED | OUTPATIENT
Start: 2022-03-13 | End: 2022-03-13

## 2022-03-13 RX ORDER — OXYCODONE HYDROCHLORIDE 5 MG/1
5 TABLET ORAL EVERY 4 HOURS
Refills: 0 | Status: DISCONTINUED | OUTPATIENT
Start: 2022-03-13 | End: 2022-03-14

## 2022-03-13 RX ORDER — SODIUM CHLORIDE 9 MG/ML
1000 INJECTION, SOLUTION INTRAVENOUS
Refills: 0 | Status: DISCONTINUED | OUTPATIENT
Start: 2022-03-13 | End: 2022-03-14

## 2022-03-13 RX ORDER — DEXTROSE 50 % IN WATER 50 %
50 SYRINGE (ML) INTRAVENOUS ONCE
Refills: 0 | Status: COMPLETED | OUTPATIENT
Start: 2022-03-13 | End: 2022-03-13

## 2022-03-13 RX ORDER — ACETAMINOPHEN 500 MG
1000 TABLET ORAL EVERY 6 HOURS
Refills: 0 | Status: DISCONTINUED | OUTPATIENT
Start: 2022-03-13 | End: 2022-03-14

## 2022-03-13 RX ADMIN — SODIUM CHLORIDE 75 MILLILITER(S): 9 INJECTION, SOLUTION INTRAVENOUS at 23:43

## 2022-03-13 RX ADMIN — HEPARIN SODIUM 5000 UNIT(S): 5000 INJECTION INTRAVENOUS; SUBCUTANEOUS at 15:36

## 2022-03-13 RX ADMIN — BUDESONIDE AND FORMOTEROL FUMARATE DIHYDRATE 2 PUFF(S): 160; 4.5 AEROSOL RESPIRATORY (INHALATION) at 22:59

## 2022-03-13 RX ADMIN — MIDAZOLAM HYDROCHLORIDE 2 MILLIGRAM(S): 1 INJECTION, SOLUTION INTRAMUSCULAR; INTRAVENOUS at 13:04

## 2022-03-13 RX ADMIN — HEPARIN SODIUM 5000 UNIT(S): 5000 INJECTION INTRAVENOUS; SUBCUTANEOUS at 22:21

## 2022-03-13 RX ADMIN — FENTANYL CITRATE 50 MICROGRAM(S): 50 INJECTION INTRAVENOUS at 13:04

## 2022-03-13 RX ADMIN — HEPARIN SODIUM 5000 UNIT(S): 5000 INJECTION INTRAVENOUS; SUBCUTANEOUS at 07:01

## 2022-03-13 RX ADMIN — FENTANYL CITRATE 50 MICROGRAM(S): 50 INJECTION INTRAVENOUS at 13:03

## 2022-03-13 RX ADMIN — Medication 200 GRAM(S): at 23:43

## 2022-03-13 RX ADMIN — SODIUM CHLORIDE 75 MILLILITER(S): 9 INJECTION INTRAMUSCULAR; INTRAVENOUS; SUBCUTANEOUS at 17:47

## 2022-03-13 NOTE — BRIEF OPERATIVE NOTE - OPERATION/FINDINGS
transverse loop colostomy for LBO secondary to large compressing bladder mass  red rubber catheter left in place

## 2022-03-13 NOTE — CHART NOTE - NSCHARTNOTEFT_GEN_A_CORE
Discussed w/ ACP and surgery resident on call, patient pending transfer to SICU. Surgery will assume care at this time. Advised to call [facility] when medically stable to transfer to floors.    Sanaz Perez MD  Pager # [phone] Discussed w/ ACP and surgery resident on call, patient pending transfer to SICU. Surgery will assume care at this time. Advised to call [facility] when medically stable to transfer to floors.    Sanaz Perez MD  Hospitalist  Pager # [phone]

## 2022-03-13 NOTE — CHART NOTE - NSCHARTNOTEFT_GEN_A_CORE
PT noted with K 6.1 ( Hemolyzed), Repeat BMP ordered Stat  above was communicated to surgery resident ( # [id number]), she will follow up with result.

## 2022-03-13 NOTE — CHART NOTE - NSCHARTNOTEFT_GEN_A_CORE
Pre-operative Note    - Pre-operative Diagnosis: LBO 2/2 external bladder cancer compression    - Procedure: ex lap ostomy creation with possible bowel resection     - Labs:                        11.7   6.82  )-----------( 278      ( 13 Mar 2022 07:08 )             38.9     03-13    146<H>  |  107  |  28<H>  ----------------------------<  100<H>  5.1   |  27  |  1.51<H>    Ca    8.7      13 Mar 2022 10:58  Phos  3.8     03-13  Mg     3.90     03-13    TPro  7.2  /  Alb  3.1<L>  /  TBili  0.2  /  DBili  x   /  AST  68<H>  /  ALT  61<H>  /  AlkPhos  68  03-13    PT/INR - ( 12 Mar 2022 07:42 )   PT: 13.3 sec;   INR: 1.14 ratio         PTT - ( 12 Mar 2022 07:42 )  PTT:30.7 sec  Type & Screen #1:  Type & Screen #2:    - CXR: most recent 3/12/22 no significant change in pleural parenchymal pattern     - Blood: Not needed.     - Orders:  > NPO  > IVF   > Labs up to date   >Plan for OR this evening.   >Discussed with attending, Dr. Woods    - Permits:  > Consent in chart.  > Case scheduled with OR.    A Team   [id number]
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
## 2022-03-13 NOTE — PROGRESS NOTE ADULT - SUBJECTIVE AND OBJECTIVE BOX
S: Pt seen and examined.                MEDICATIONS  (STANDING):  amLODIPine   Tablet 5 milliGRAM(s) Oral daily  budesonide  80 MICROgram(s)/formoterol 4.5 MICROgram(s) Inhaler 2 Puff(s) Inhalation two times a day  cholecalciferol 2000 Unit(s) Oral daily  cyanocobalamin 1000 MICROGram(s) Oral daily  heparin   Injectable 5000 Unit(s) SubCutaneous every 8 hours  pantoprazole    Tablet 40 milliGRAM(s) Oral before breakfast  sodium chloride 0.9%. 1000 milliLiter(s) (75 mL/Hr) IV Continuous <Continuous>  tamsulosin 0.4 milliGRAM(s) Oral at bedtime  tiotropium 18 MICROgram(s) Capsule 1 Capsule(s) Inhalation daily    MEDICATIONS  (PRN):  ALBUTerol    90 MICROgram(s) HFA Inhaler 2 Puff(s) Inhalation every 6 hours PRN Shortness of Breath and/or Wheezing      LABS:                        12.1   6.25  )-----------( 308      ( 12 Mar 2022 07:42 )             38.2     03-12    147<H>  |  105  |  27<H>  ----------------------------<  101<H>  5.0   |  28  |  1.72<H>    Ca    9.4      12 Mar 2022 07:42  Phos  3.9     03-12  Mg     3.80     03-12    TPro  7.5  /  Alb  3.4  /  TBili  0.3  /  DBili  x   /  AST  86<H>  /  ALT  66<H>  /  AlkPhos  72  03-12    PT/INR - ( 12 Mar 2022 07:42 )   PT: 13.3 sec;   INR: 1.14 ratio         PTT - ( 12 Mar 2022 07:42 )  PTT:30.7 sec  Urinalysis Basic - ( 12 Mar 2022 02:28 )    Color: x / Appearance: x / SG: x / pH: x  Gluc: x / Ketone: x  / Bili: x / Urobili: x   Blood: x / Protein: x / Nitrite: x   Leuk Esterase: x / RBC: 251 /HPF / WBC 45 /HPF   Sq Epi: x / Non Sq Epi: 6 /HPF / Bacteria: Few          Vital Signs Last 24 Hrs  T(C): 36.4 (12 Mar 2022 23:52), Max: 36.4 (12 Mar 2022 05:15)  T(F): 97.5 (12 Mar 2022 23:52), Max: 97.5 (12 Mar 2022 05:15)  HR: 79 (12 Mar 2022 23:52) (74 - 97)  BP: 136/75 (12 Mar 2022 23:52) (103/58 - 136/75)  BP(mean): --  RR: 18 (12 Mar 2022 23:52) (15 - 18)  SpO2: 96% (12 Mar 2022 23:52) (96% - 100%)      I&O's Summary    12 Mar 2022 06:01  -  13 Mar 2022 03:50  --------------------------------------------------------  IN: 0 mL / OUT: 300 mL / NET: -300 mL      I&O's Detail    12 Mar 2022 06:01  -  13 Mar 2022 03:50  --------------------------------------------------------  IN:  Total IN: 0 mL    OUT:    Voided (mL): 300 mL  Total OUT: 300 mL    Total NET: -300 mL          General Appearance: NAD  Chest: Equal expansion bilaterally, face tent in place  CV: Pulse regular presently  Abdomen: Soft, distended, nttp, tympanic to percussion, no g/r. NGT in place. Rectal tube in place.   Extremities: warm, well perfused, Grossly symmetric, SCD's in place    S: Pt seen and examined. Denies acute onset abdomina pain, NVD, fevers, chills, lightheadedness, SOB, CP. NPO                MEDICATIONS  (STANDING):  amLODIPine   Tablet 5 milliGRAM(s) Oral daily  budesonide  80 MICROgram(s)/formoterol 4.5 MICROgram(s) Inhaler 2 Puff(s) Inhalation two times a day  cholecalciferol 2000 Unit(s) Oral daily  cyanocobalamin 1000 MICROGram(s) Oral daily  heparin   Injectable 5000 Unit(s) SubCutaneous every 8 hours  pantoprazole    Tablet 40 milliGRAM(s) Oral before breakfast  sodium chloride 0.9%. 1000 milliLiter(s) (75 mL/Hr) IV Continuous <Continuous>  tamsulosin 0.4 milliGRAM(s) Oral at bedtime  tiotropium 18 MICROgram(s) Capsule 1 Capsule(s) Inhalation daily    MEDICATIONS  (PRN):  ALBUTerol    90 MICROgram(s) HFA Inhaler 2 Puff(s) Inhalation every 6 hours PRN Shortness of Breath and/or Wheezing      LABS:                        12.1   6.25  )-----------( 308      ( 12 Mar 2022 07:42 )             38.2     03-12    147<H>  |  105  |  27<H>  ----------------------------<  101<H>  5.0   |  28  |  1.72<H>    Ca    9.4      12 Mar 2022 07:42  Phos  3.9     03-12  Mg     3.80     03-12    TPro  7.5  /  Alb  3.4  /  TBili  0.3  /  DBili  x   /  AST  86<H>  /  ALT  66<H>  /  AlkPhos  72  03-12    PT/INR - ( 12 Mar 2022 07:42 )   PT: 13.3 sec;   INR: 1.14 ratio         PTT - ( 12 Mar 2022 07:42 )  PTT:30.7 sec  Urinalysis Basic - ( 12 Mar 2022 02:28 )    Color: x / Appearance: x / SG: x / pH: x  Gluc: x / Ketone: x  / Bili: x / Urobili: x   Blood: x / Protein: x / Nitrite: x   Leuk Esterase: x / RBC: 251 /HPF / WBC 45 /HPF   Sq Epi: x / Non Sq Epi: 6 /HPF / Bacteria: Few          Vital Signs Last 24 Hrs  T(C): 36.4 (12 Mar 2022 23:52), Max: 36.4 (12 Mar 2022 05:15)  T(F): 97.5 (12 Mar 2022 23:52), Max: 97.5 (12 Mar 2022 05:15)  HR: 79 (12 Mar 2022 23:52) (74 - 97)  BP: 136/75 (12 Mar 2022 23:52) (103/58 - 136/75)  BP(mean): --  RR: 18 (12 Mar 2022 23:52) (15 - 18)  SpO2: 96% (12 Mar 2022 23:52) (96% - 100%)      I&O's Summary    12 Mar 2022 06:01  -  13 Mar 2022 03:50  --------------------------------------------------------  IN: 0 mL / OUT: 300 mL / NET: -300 mL      I&O's Detail    12 Mar 2022 06:01  -  13 Mar 2022 03:50  --------------------------------------------------------  IN:  Total IN: 0 mL    OUT:    Voided (mL): 300 mL  Total OUT: 300 mL    Total NET: -300 mL          General Appearance: NAD  Chest: Equal expansion bilaterally, face tent in place  CV: Pulse regular presently  Abdomen: Soft, distended, nttp, tympanic to percussion, no g/r. NGT in place. Rectal tube in place.   Extremities: warm, well perfused, Grossly symmetric, SCD's in place

## 2022-03-13 NOTE — PROGRESS NOTE ADULT - ASSESSMENT
81 year old male with history of COPD (on home O2 @ 4L NC), Pneumothorax (1/22), Vit D deficiency, HTN (not on any meds), Bladder Ca unresectable s/p chemo and recently started on immunotherapy who presents with partial LBO 2/2 extraluminal compression of the sigmoid by the bladder cancer. Oral contrast seen at the rectum. Now s/p NGT, Rectal tube.       - No acute surgical intervention, patient not completely obstructed   - Only surgical intervention for this patient would be diverting colostomy, but patient is a poor surgical candidate due to respiratory status  - GI recs appreciated  - NGT/NPO/IVF  - Care per primary    A Team   [id number]     81 year old male with history of COPD (on home O2 @ 4L NC), Pneumothorax (1/22), Vit D deficiency, HTN (not on any meds), Bladder Ca unresectable s/p chemo and recently started on immunotherapy who presents with partial LBO 2/2 extraluminal compression of the sigmoid by the bladder cancer. Oral contrast seen at the rectum. Now s/p NGT, Rectal tube.      Plan:  - NPO  -NGT  -GI sigmoidoscopy to attempt decompression  - NGT/NPO/IVF  - Care per primary  -plan discussed with attending on call     A Team   [id number]

## 2022-03-13 NOTE — CHART NOTE - NSCHARTNOTEFT_GEN_A_CORE
GI following for bowel obstruction. Underwent bedside flex sig in SICU (see provation note for full details)    Briefly:  - stool seen in rectum  - complete obstruction at 30cm. Attempts to switch for gastroscope, advance guidewire unsuccessful  - patient received 2mg versed; 50mg fentyl during procedure    Plan:  - will discuss w/ colorectal surgery repeating eattempt flex sig in OR under flouro in attempt to bypass obstruction vs colostomy    D/W Dr. oPrtillo    GI will continue to follow.     All recommendations are tentative until note is attested by attending.     Phil Adams, PGY5  Gastroenterology/Hepatology Fellow  Available on [employer] Teams  [phone] ([employer] Short Range Pager)  [phone] (Long Range Pager)    After 5pm, please contact the on-call GI fellow. [phone]

## 2022-03-13 NOTE — PROGRESS NOTE ADULT - SUBJECTIVE AND OBJECTIVE BOX
Gastroenterology/Hepatology Progress Note      Interval Events:   - yesterday NGT placed, ~300cc output  - rectal tube placed, no output  - ongoing abd distention, worsening abd pain, worsening rest status overnight (now on face tent 10L)    Allergies:  allergic to cefobid, unknown reaction (Other)  penicillin (Rash)      Hospital Medications:  ALBUTerol    90 MICROgram(s) HFA Inhaler 2 Puff(s) Inhalation every 6 hours PRN  amLODIPine   Tablet 5 milliGRAM(s) Oral daily  budesonide  80 MICROgram(s)/formoterol 4.5 MICROgram(s) Inhaler 2 Puff(s) Inhalation two times a day  cholecalciferol 2000 Unit(s) Oral daily  cyanocobalamin 1000 MICROGram(s) Oral daily  heparin   Injectable 5000 Unit(s) SubCutaneous every 8 hours  pantoprazole    Tablet 40 milliGRAM(s) Oral before breakfast  sodium chloride 0.9%. 1000 milliLiter(s) IV Continuous <Continuous>  tamsulosin 0.4 milliGRAM(s) Oral at bedtime  tiotropium 18 MICROgram(s) Capsule 1 Capsule(s) Inhalation daily      ROS: 14 point ROS negative unless otherwise state in subjective    PHYSICAL EXAM:   Vital Signs:  Vital Signs Last 24 Hrs  T(C): 36.3 (13 Mar 2022 06:30), Max: 36.4 (12 Mar 2022 23:52)  T(F): 97.4 (13 Mar 2022 06:30), Max: 97.5 (12 Mar 2022 23:52)  HR: 73 (13 Mar 2022 06:30) (73 - 97)  BP: 133/69 (13 Mar 2022 06:30) (115/65 - 136/75)  BP(mean): --  RR: 17 (13 Mar 2022 06:30) (16 - 18)  SpO2: 95% (13 Mar 2022 06:30) (95% - 100%)  Daily Height in cm: 182.88 (12 Mar 2022 08:31)    Daily     GENERAL:  No acute distress  HEENT:  NGT in place w/ clear output  CHEST: no resp distress on face tent  HEART:  RRR  ABDOMEN:  Soft, non-tender, non-distended, normoactive bowel sounds, no masses  RECTAL: rectal tube in place w/o output  EXTREMITIES:  No cyanosis, clubbing, or edema  SKIN:  No rash/erythema/ecchymoses/petechiae/wounds/abscess/warm/dry  NEURO:  Alert and oriented x 3, no asterixis, no tremor    LABS:                        11.7   6.82  )-----------( 278      ( 13 Mar 2022 07:08 )             38.9     Mean Cell Volume: 95.8 fL (03-13-22 @ 07:08)    03-13    145  |  107  |  28<H>  ----------------------------<  96  6.1<H>   |  24  |  1.54<H>    Ca    8.9      13 Mar 2022 07:08  Phos  4.1     03-13  Mg     3.80     03-13    TPro  7.2  /  Alb  3.1<L>  /  TBili  0.2  /  DBili  x   /  AST  68<H>  /  ALT  61<H>  /  AlkPhos  68  03-13    LIVER FUNCTIONS - ( 13 Mar 2022 07:08 )  Alb: 3.1 g/dL / Pro: 7.2 g/dL / ALK PHOS: 68 U/L / ALT: 61 U/L / AST: 68 U/L / GGT: x           PT/INR - ( 12 Mar 2022 07:42 )   PT: 13.3 sec;   INR: 1.14 ratio         PTT - ( 12 Mar 2022 07:42 )  PTT:30.7 sec  Urinalysis Basic - ( 12 Mar 2022 02:28 )    Color: x / Appearance: x / SG: x / pH: x  Gluc: x / Ketone: x  / Bili: x / Urobili: x   Blood: x / Protein: x / Nitrite: x   Leuk Esterase: x / RBC: 251 /HPF / WBC 45 /HPF   Sq Epi: x / Non Sq Epi: 6 /HPF / Bacteria: Few            Imaging:

## 2022-03-13 NOTE — PROGRESS NOTE ADULT - ASSESSMENT
Impression:  # abd distention - dilated loops of small and large bowel, incompentent ileocecal valve. Dilation possibly 2/2 extrinsic obstruction from pelvic mass (bladder Ca) vs ileus. NGT placed by surgery, rectal tube placed by GI, still w/ no improvement in distention. Will plan for endoscopic decompression  # UTI  # hydronephrosis  # hypoxia - likely multifactorial; has loculated pleural effusion; intersitial markings, and also possibly 2/2 compressive atelectasis given abdominal distention, CXR w/o primary pulmonary process    Recommendations:  - NGT to low intermittent suction  - 2 tap water enemas (ordered by GI)  - plan for endoscopic decompression today pending OR availability  - c/w abx for UTI  - NPO  - rest of care per primary team    GI will continue to follow.     All recommendations are tentative until note is attested by attending.     Phil Adams, PGY5  Gastroenterology/Hepatology Fellow  Available on [employer] Teams  [phone] ([employer] Short Range Pager)  [phone] (Long Range Pager)    After 5pm, please contact the on-call GI fellow. [phone]

## 2022-03-13 NOTE — CONSULT NOTE ADULT - ATTENDING COMMENTS
prerenal azotemia with elevated creatinine likely 2/2 dehydration 2/2 LBO  -aggressive preop iv hydration with isotonic crystalloid and colloid  -monitor respiratory status (impaired ventilation due to abd distention and underlying emphysema)  -high risk for hemodynamic and resp deterioration given age, co-morbidities and need for urgent decompression    critically ill
81 year old male with bladder cancer. Pt has dilated loops of bowel consistent with ileus but the bladder tumor may be obstructing the left colon.    Plan: NG tube with suction and rectal tube. If pt is no better tomorrow will consider endoscopic decompression.

## 2022-03-13 NOTE — CHART NOTE - NSCHARTNOTEFT_GEN_A_CORE
Received patient from Medical floors for bedside sigmoidoscopy with GI. Received 2mg versed and 50 of fentanyl; unsuccessful attempt at decompression and is scheduled to go to the OR with GI later this afternoon. Patient to wait in SICU until then.

## 2022-03-14 DIAGNOSIS — Z51.5 ENCOUNTER FOR PALLIATIVE CARE: ICD-10-CM

## 2022-03-14 DIAGNOSIS — Z71.89 OTHER SPECIFIED COUNSELING: ICD-10-CM

## 2022-03-14 LAB
ANION GAP SERPL CALC-SCNC: 10 MMOL/L — SIGNIFICANT CHANGE UP (ref 7–14)
ANION GAP SERPL CALC-SCNC: 10 MMOL/L — SIGNIFICANT CHANGE UP (ref 7–14)
ANION GAP SERPL CALC-SCNC: 7 MMOL/L — SIGNIFICANT CHANGE UP (ref 7–14)
ANION GAP SERPL CALC-SCNC: 8 MMOL/L — SIGNIFICANT CHANGE UP (ref 7–14)
APTT BLD: 36.1 SEC — SIGNIFICANT CHANGE UP (ref 27–36.3)
BASE EXCESS BLDV CALC-SCNC: 2.5 MMOL/L — SIGNIFICANT CHANGE UP (ref -2–3)
BLOOD GAS VENOUS COMPREHENSIVE RESULT: SIGNIFICANT CHANGE UP
BUN SERPL-MCNC: 23 MG/DL — SIGNIFICANT CHANGE UP (ref 7–23)
BUN SERPL-MCNC: 24 MG/DL — HIGH (ref 7–23)
BUN SERPL-MCNC: 26 MG/DL — HIGH (ref 7–23)
BUN SERPL-MCNC: 27 MG/DL — HIGH (ref 7–23)
CALCIUM SERPL-MCNC: 8.6 MG/DL — SIGNIFICANT CHANGE UP (ref 8.4–10.5)
CHLORIDE BLDV-SCNC: 109 MMOL/L — HIGH (ref 96–108)
CHLORIDE SERPL-SCNC: 107 MMOL/L — SIGNIFICANT CHANGE UP (ref 98–107)
CHLORIDE SERPL-SCNC: 108 MMOL/L — HIGH (ref 98–107)
CHLORIDE SERPL-SCNC: 110 MMOL/L — HIGH (ref 98–107)
CHLORIDE SERPL-SCNC: 111 MMOL/L — HIGH (ref 98–107)
CO2 BLDV-SCNC: 29.8 MMOL/L — HIGH (ref 22–26)
CO2 SERPL-SCNC: 25 MMOL/L — SIGNIFICANT CHANGE UP (ref 22–31)
CO2 SERPL-SCNC: 27 MMOL/L — SIGNIFICANT CHANGE UP (ref 22–31)
CO2 SERPL-SCNC: 28 MMOL/L — SIGNIFICANT CHANGE UP (ref 22–31)
CO2 SERPL-SCNC: 29 MMOL/L — SIGNIFICANT CHANGE UP (ref 22–31)
CREAT ?TM UR-MCNC: 55 MG/DL — SIGNIFICANT CHANGE UP
CREAT SERPL-MCNC: 1.4 MG/DL — HIGH (ref 0.5–1.3)
CREAT SERPL-MCNC: 1.4 MG/DL — HIGH (ref 0.5–1.3)
CREAT SERPL-MCNC: 1.42 MG/DL — HIGH (ref 0.5–1.3)
CREAT SERPL-MCNC: 1.44 MG/DL — HIGH (ref 0.5–1.3)
CULTURE RESULTS: SIGNIFICANT CHANGE UP
EGFR: 49 ML/MIN/1.73M2 — LOW
EGFR: 50 ML/MIN/1.73M2 — LOW
EGFR: 51 ML/MIN/1.73M2 — LOW
EGFR: 51 ML/MIN/1.73M2 — LOW
GAS PNL BLDV: 141 MMOL/L — SIGNIFICANT CHANGE UP (ref 136–145)
GAS PNL BLDV: SIGNIFICANT CHANGE UP
GLUCOSE BLDV-MCNC: 94 MG/DL — SIGNIFICANT CHANGE UP (ref 70–99)
GLUCOSE SERPL-MCNC: 129 MG/DL — HIGH (ref 70–99)
GLUCOSE SERPL-MCNC: 136 MG/DL — HIGH (ref 70–99)
GLUCOSE SERPL-MCNC: 85 MG/DL — SIGNIFICANT CHANGE UP (ref 70–99)
GLUCOSE SERPL-MCNC: 95 MG/DL — SIGNIFICANT CHANGE UP (ref 70–99)
HCO3 BLDV-SCNC: 28 MMOL/L — SIGNIFICANT CHANGE UP (ref 22–29)
HCT VFR BLD CALC: 29.4 % — LOW (ref 39–50)
HCT VFR BLD CALC: 31.1 % — LOW (ref 39–50)
HCT VFR BLD CALC: 32.3 % — LOW (ref 39–50)
HCT VFR BLDA CALC: 29 % — LOW (ref 39–51)
HGB BLD CALC-MCNC: 9.5 G/DL — LOW (ref 13–17)
HGB BLD-MCNC: 10.1 G/DL — LOW (ref 13–17)
HGB BLD-MCNC: 9.2 G/DL — LOW (ref 13–17)
HGB BLD-MCNC: 9.3 G/DL — LOW (ref 13–17)
INR BLD: 1.21 RATIO — HIGH (ref 0.88–1.16)
LACTATE BLDV-MCNC: 1.4 MMOL/L — SIGNIFICANT CHANGE UP (ref 0.5–2)
MAGNESIUM SERPL-MCNC: 2.9 MG/DL — HIGH (ref 1.6–2.6)
MAGNESIUM SERPL-MCNC: 3.2 MG/DL — HIGH (ref 1.6–2.6)
MAGNESIUM SERPL-MCNC: 3.3 MG/DL — HIGH (ref 1.6–2.6)
MCHC RBC-ENTMCNC: 27.7 PG — SIGNIFICANT CHANGE UP (ref 27–34)
MCHC RBC-ENTMCNC: 28.7 PG — SIGNIFICANT CHANGE UP (ref 27–34)
MCHC RBC-ENTMCNC: 29.1 PG — SIGNIFICANT CHANGE UP (ref 27–34)
MCHC RBC-ENTMCNC: 29.6 GM/DL — LOW (ref 32–36)
MCHC RBC-ENTMCNC: 31.3 GM/DL — LOW (ref 32–36)
MCHC RBC-ENTMCNC: 31.6 GM/DL — LOW (ref 32–36)
MCV RBC AUTO: 91.8 FL — SIGNIFICANT CHANGE UP (ref 80–100)
MCV RBC AUTO: 91.9 FL — SIGNIFICANT CHANGE UP (ref 80–100)
MCV RBC AUTO: 93.7 FL — SIGNIFICANT CHANGE UP (ref 80–100)
NRBC # BLD: 0 /100 WBCS — SIGNIFICANT CHANGE UP
NRBC # FLD: 0 K/UL — SIGNIFICANT CHANGE UP
OSMOLALITY UR: 419 MOSM/KG — SIGNIFICANT CHANGE UP (ref 50–1200)
PCO2 BLDV: 49 MMHG — SIGNIFICANT CHANGE UP (ref 42–55)
PH BLDV: 7.37 — SIGNIFICANT CHANGE UP (ref 7.32–7.43)
PHOSPHATE SERPL-MCNC: 2.6 MG/DL — SIGNIFICANT CHANGE UP (ref 2.5–4.5)
PHOSPHATE SERPL-MCNC: 3 MG/DL — SIGNIFICANT CHANGE UP (ref 2.5–4.5)
PHOSPHATE SERPL-MCNC: 3.1 MG/DL — SIGNIFICANT CHANGE UP (ref 2.5–4.5)
PLATELET # BLD AUTO: 263 K/UL — SIGNIFICANT CHANGE UP (ref 150–400)
PLATELET # BLD AUTO: 267 K/UL — SIGNIFICANT CHANGE UP (ref 150–400)
PLATELET # BLD AUTO: 270 K/UL — SIGNIFICANT CHANGE UP (ref 150–400)
PO2 BLDV: 55 MMHG — SIGNIFICANT CHANGE UP
POTASSIUM BLDV-SCNC: 6.7 MMOL/L — CRITICAL HIGH (ref 3.5–5.1)
POTASSIUM SERPL-MCNC: 5.2 MMOL/L — SIGNIFICANT CHANGE UP (ref 3.5–5.3)
POTASSIUM SERPL-MCNC: 5.5 MMOL/L — HIGH (ref 3.5–5.3)
POTASSIUM SERPL-MCNC: 5.6 MMOL/L — HIGH (ref 3.5–5.3)
POTASSIUM SERPL-MCNC: 6 MMOL/L — HIGH (ref 3.5–5.3)
POTASSIUM SERPL-SCNC: 5.2 MMOL/L — SIGNIFICANT CHANGE UP (ref 3.5–5.3)
POTASSIUM SERPL-SCNC: 5.5 MMOL/L — HIGH (ref 3.5–5.3)
POTASSIUM SERPL-SCNC: 5.6 MMOL/L — HIGH (ref 3.5–5.3)
POTASSIUM SERPL-SCNC: 6 MMOL/L — HIGH (ref 3.5–5.3)
PROTHROM AB SERPL-ACNC: 14.1 SEC — HIGH (ref 10.5–13.4)
RBC # BLD: 3.2 M/UL — LOW (ref 4.2–5.8)
RBC # BLD: 3.32 M/UL — LOW (ref 4.2–5.8)
RBC # BLD: 3.52 M/UL — LOW (ref 4.2–5.8)
RBC # FLD: 17.9 % — HIGH (ref 10.3–14.5)
SAO2 % BLDV: 88.4 % — SIGNIFICANT CHANGE UP
SODIUM SERPL-SCNC: 142 MMOL/L — SIGNIFICANT CHANGE UP (ref 135–145)
SODIUM SERPL-SCNC: 145 MMOL/L — SIGNIFICANT CHANGE UP (ref 135–145)
SODIUM SERPL-SCNC: 146 MMOL/L — HIGH (ref 135–145)
SODIUM SERPL-SCNC: 147 MMOL/L — HIGH (ref 135–145)
SODIUM UR-SCNC: 81 MMOL/L — SIGNIFICANT CHANGE UP
SPECIMEN SOURCE: SIGNIFICANT CHANGE UP
WBC # BLD: 5.56 K/UL — SIGNIFICANT CHANGE UP (ref 3.8–10.5)
WBC # BLD: 6.15 K/UL — SIGNIFICANT CHANGE UP (ref 3.8–10.5)
WBC # BLD: 9.69 K/UL — SIGNIFICANT CHANGE UP (ref 3.8–10.5)
WBC # FLD AUTO: 5.56 K/UL — SIGNIFICANT CHANGE UP (ref 3.8–10.5)
WBC # FLD AUTO: 6.15 K/UL — SIGNIFICANT CHANGE UP (ref 3.8–10.5)
WBC # FLD AUTO: 9.69 K/UL — SIGNIFICANT CHANGE UP (ref 3.8–10.5)

## 2022-03-14 PROCEDURE — 99498 ADVNCD CARE PLAN ADDL 30 MIN: CPT | Mod: 25

## 2022-03-14 PROCEDURE — 99232 SBSQ HOSP IP/OBS MODERATE 35: CPT | Mod: GC

## 2022-03-14 PROCEDURE — 99223 1ST HOSP IP/OBS HIGH 75: CPT | Mod: 25

## 2022-03-14 PROCEDURE — 99232 SBSQ HOSP IP/OBS MODERATE 35: CPT

## 2022-03-14 PROCEDURE — 93010 ELECTROCARDIOGRAM REPORT: CPT

## 2022-03-14 PROCEDURE — 99497 ADVNCD CARE PLAN 30 MIN: CPT | Mod: 25

## 2022-03-14 PROCEDURE — 71045 X-RAY EXAM CHEST 1 VIEW: CPT | Mod: 26

## 2022-03-14 RX ORDER — HYDROMORPHONE HYDROCHLORIDE 2 MG/ML
0.5 INJECTION INTRAMUSCULAR; INTRAVENOUS; SUBCUTANEOUS EVERY 4 HOURS
Refills: 0 | Status: DISCONTINUED | OUTPATIENT
Start: 2022-03-14 | End: 2022-03-15

## 2022-03-14 RX ORDER — DEXTROSE 50 % IN WATER 50 %
50 SYRINGE (ML) INTRAVENOUS ONCE
Refills: 0 | Status: COMPLETED | OUTPATIENT
Start: 2022-03-14 | End: 2022-03-14

## 2022-03-14 RX ORDER — ALBUMIN HUMAN 25 %
250 VIAL (ML) INTRAVENOUS ONCE
Refills: 0 | Status: COMPLETED | OUTPATIENT
Start: 2022-03-14 | End: 2022-03-14

## 2022-03-14 RX ORDER — INSULIN HUMAN 100 [IU]/ML
10 INJECTION, SOLUTION SUBCUTANEOUS ONCE
Refills: 0 | Status: COMPLETED | OUTPATIENT
Start: 2022-03-14 | End: 2022-03-14

## 2022-03-14 RX ORDER — ENOXAPARIN SODIUM 100 MG/ML
30 INJECTION SUBCUTANEOUS EVERY 24 HOURS
Refills: 0 | Status: DISCONTINUED | OUTPATIENT
Start: 2022-03-14 | End: 2022-03-16

## 2022-03-14 RX ORDER — PHYTONADIONE (VIT K1) 5 MG
10 TABLET ORAL ONCE
Refills: 0 | Status: COMPLETED | OUTPATIENT
Start: 2022-03-14 | End: 2022-03-14

## 2022-03-14 RX ORDER — ACETAMINOPHEN 500 MG
1000 TABLET ORAL EVERY 6 HOURS
Refills: 0 | Status: DISCONTINUED | OUTPATIENT
Start: 2022-03-14 | End: 2022-03-15

## 2022-03-14 RX ORDER — SODIUM CHLORIDE 9 MG/ML
1000 INJECTION, SOLUTION INTRAVENOUS
Refills: 0 | Status: DISCONTINUED | OUTPATIENT
Start: 2022-03-14 | End: 2022-03-14

## 2022-03-14 RX ORDER — PANTOPRAZOLE SODIUM 20 MG/1
40 TABLET, DELAYED RELEASE ORAL
Refills: 0 | Status: DISCONTINUED | OUTPATIENT
Start: 2022-03-14 | End: 2022-03-15

## 2022-03-14 RX ORDER — CALCIUM GLUCONATE 100 MG/ML
2 VIAL (ML) INTRAVENOUS ONCE
Refills: 0 | Status: DISCONTINUED | OUTPATIENT
Start: 2022-03-14 | End: 2022-03-14

## 2022-03-14 RX ORDER — ALBUTEROL 90 UG/1
10 AEROSOL, METERED ORAL ONCE
Refills: 0 | Status: COMPLETED | OUTPATIENT
Start: 2022-03-14 | End: 2022-03-14

## 2022-03-14 RX ORDER — SODIUM CHLORIDE 9 MG/ML
1000 INJECTION, SOLUTION INTRAVENOUS ONCE
Refills: 0 | Status: COMPLETED | OUTPATIENT
Start: 2022-03-14 | End: 2022-03-14

## 2022-03-14 RX ORDER — INSULIN HUMAN 100 [IU]/ML
5 INJECTION, SOLUTION SUBCUTANEOUS ONCE
Refills: 0 | Status: COMPLETED | OUTPATIENT
Start: 2022-03-14 | End: 2022-03-14

## 2022-03-14 RX ORDER — SODIUM ZIRCONIUM CYCLOSILICATE 10 G/10G
5 POWDER, FOR SUSPENSION ORAL DAILY
Refills: 0 | Status: COMPLETED | OUTPATIENT
Start: 2022-03-14 | End: 2022-03-17

## 2022-03-14 RX ORDER — SODIUM CHLORIDE 9 MG/ML
1000 INJECTION, SOLUTION INTRAVENOUS
Refills: 0 | Status: DISCONTINUED | OUTPATIENT
Start: 2022-03-14 | End: 2022-03-17

## 2022-03-14 RX ORDER — INSULIN HUMAN 100 [IU]/ML
10 INJECTION, SOLUTION SUBCUTANEOUS ONCE
Refills: 0 | Status: DISCONTINUED | OUTPATIENT
Start: 2022-03-14 | End: 2022-03-14

## 2022-03-14 RX ORDER — CALCIUM GLUCONATE 100 MG/ML
2 VIAL (ML) INTRAVENOUS ONCE
Refills: 0 | Status: COMPLETED | OUTPATIENT
Start: 2022-03-14 | End: 2022-03-14

## 2022-03-14 RX ORDER — SODIUM ZIRCONIUM CYCLOSILICATE 10 G/10G
5 POWDER, FOR SUSPENSION ORAL EVERY OTHER DAY
Refills: 0 | Status: DISCONTINUED | OUTPATIENT
Start: 2022-03-14 | End: 2022-03-14

## 2022-03-14 RX ORDER — DEXTROSE 50 % IN WATER 50 %
50 SYRINGE (ML) INTRAVENOUS ONCE
Refills: 0 | Status: DISCONTINUED | OUTPATIENT
Start: 2022-03-14 | End: 2022-03-14

## 2022-03-14 RX ORDER — HYDROMORPHONE HYDROCHLORIDE 2 MG/ML
0.25 INJECTION INTRAMUSCULAR; INTRAVENOUS; SUBCUTANEOUS EVERY 4 HOURS
Refills: 0 | Status: DISCONTINUED | OUTPATIENT
Start: 2022-03-14 | End: 2022-03-15

## 2022-03-14 RX ADMIN — Medication 400 MILLIGRAM(S): at 06:04

## 2022-03-14 RX ADMIN — Medication 200 GRAM(S): at 06:31

## 2022-03-14 RX ADMIN — Medication 1000 MILLIGRAM(S): at 06:34

## 2022-03-14 RX ADMIN — INSULIN HUMAN 10 UNIT(S): 100 INJECTION, SOLUTION SUBCUTANEOUS at 00:00

## 2022-03-14 RX ADMIN — ALBUTEROL 2 PUFF(S): 90 AEROSOL, METERED ORAL at 06:36

## 2022-03-14 RX ADMIN — Medication 400 MILLIGRAM(S): at 00:29

## 2022-03-14 RX ADMIN — Medication 50 MILLILITER(S): at 00:00

## 2022-03-14 RX ADMIN — HEPARIN SODIUM 5000 UNIT(S): 5000 INJECTION INTRAVENOUS; SUBCUTANEOUS at 14:34

## 2022-03-14 RX ADMIN — Medication 50 MILLILITER(S): at 14:19

## 2022-03-14 RX ADMIN — SODIUM CHLORIDE 100 MILLILITER(S): 9 INJECTION, SOLUTION INTRAVENOUS at 19:35

## 2022-03-14 RX ADMIN — BUDESONIDE AND FORMOTEROL FUMARATE DIHYDRATE 2 PUFF(S): 160; 4.5 AEROSOL RESPIRATORY (INHALATION) at 21:01

## 2022-03-14 RX ADMIN — HEPARIN SODIUM 5000 UNIT(S): 5000 INJECTION INTRAVENOUS; SUBCUTANEOUS at 06:03

## 2022-03-14 RX ADMIN — SODIUM CHLORIDE 100 MILLILITER(S): 9 INJECTION, SOLUTION INTRAVENOUS at 07:58

## 2022-03-14 RX ADMIN — BUDESONIDE AND FORMOTEROL FUMARATE DIHYDRATE 2 PUFF(S): 160; 4.5 AEROSOL RESPIRATORY (INHALATION) at 06:37

## 2022-03-14 RX ADMIN — Medication 125 MILLILITER(S): at 14:32

## 2022-03-14 RX ADMIN — SODIUM CHLORIDE 100 MILLILITER(S): 9 INJECTION, SOLUTION INTRAVENOUS at 14:31

## 2022-03-14 RX ADMIN — Medication 400 MILLIGRAM(S): at 23:11

## 2022-03-14 RX ADMIN — SODIUM CHLORIDE 1000 MILLILITER(S): 9 INJECTION, SOLUTION INTRAVENOUS at 03:40

## 2022-03-14 RX ADMIN — INSULIN HUMAN 5 UNIT(S): 100 INJECTION, SOLUTION SUBCUTANEOUS at 14:18

## 2022-03-14 RX ADMIN — ENOXAPARIN SODIUM 30 MILLIGRAM(S): 100 INJECTION SUBCUTANEOUS at 21:20

## 2022-03-14 RX ADMIN — Medication 125 MILLILITER(S): at 07:59

## 2022-03-14 RX ADMIN — SODIUM CHLORIDE 100 MILLILITER(S): 9 INJECTION, SOLUTION INTRAVENOUS at 05:22

## 2022-03-14 RX ADMIN — Medication 50 MILLILITER(S): at 06:31

## 2022-03-14 RX ADMIN — PANTOPRAZOLE SODIUM 40 MILLIGRAM(S): 20 TABLET, DELAYED RELEASE ORAL at 17:37

## 2022-03-14 RX ADMIN — ALBUTEROL 10 MILLIGRAM(S): 90 AEROSOL, METERED ORAL at 14:17

## 2022-03-14 RX ADMIN — Medication 1000 MILLIGRAM(S): at 00:00

## 2022-03-14 RX ADMIN — INSULIN HUMAN 10 UNIT(S): 100 INJECTION, SOLUTION SUBCUTANEOUS at 06:31

## 2022-03-14 RX ADMIN — Medication 1000 MILLIGRAM(S): at 18:52

## 2022-03-14 RX ADMIN — TAMSULOSIN HYDROCHLORIDE 0.4 MILLIGRAM(S): 0.4 CAPSULE ORAL at 21:20

## 2022-03-14 RX ADMIN — Medication 1000 MILLIGRAM(S): at 12:08

## 2022-03-14 RX ADMIN — PANTOPRAZOLE SODIUM 40 MILLIGRAM(S): 20 TABLET, DELAYED RELEASE ORAL at 11:32

## 2022-03-14 RX ADMIN — TIOTROPIUM BROMIDE 1 CAPSULE(S): 18 CAPSULE ORAL; RESPIRATORY (INHALATION) at 21:00

## 2022-03-14 RX ADMIN — Medication 400 MILLIGRAM(S): at 11:32

## 2022-03-14 RX ADMIN — Medication 400 MILLIGRAM(S): at 17:36

## 2022-03-14 RX ADMIN — Medication 102 MILLIGRAM(S): at 04:12

## 2022-03-14 NOTE — CONSULT NOTE ADULT - PROBLEM SELECTOR RECOMMENDATION 4
Thank you for allowing us to participate in your patient's care. Goals are clear and symptoms are controlled. Please page [phone] for any q's or c's.     Concetta Bui D.O.   Palliative Medicine

## 2022-03-14 NOTE — PROGRESS NOTE ADULT - ASSESSMENT
Impression:  # LBO - from extrinsic compression from bladder Ca, s/p failed NGT/rectal tube, failed endoscopic decompression, no s/p colostomy, tolerated well, functioning ostomy  # UTI  # hydronephrosis  # hypoxia - likely multifactorial; has loculated pleural effusion; intersitial markings, and also possibly 2/2 compressive atelectasis given abdominal distention, CXR w/o primary pulmonary process    Recommendations:  - management of ostomy and LBO per surgery team  - f/u w/ oncology for bladder ca    GI will sign off. Please reconsult as necessary    All recommendations are tentative until note is attested by attending.     Phil Adams, PGY5  Gastroenterology/Hepatology Fellow  Available on [employer] Teams  [phone] ([employer] Short Range Pager)  [phone] (Long Range Pager)    After 5pm, please contact the on-call GI fellow. [phone]

## 2022-03-14 NOTE — CONSULT NOTE ADULT - ASSESSMENT
81 year old male with history of COPD (on home O2 @ 4L NC), Pneumothorax (1/22), Vit D deficiency, HTN (not on any meds), Bladder Ca unresectable s/p chemo and recently started on immunotherapy who presents with partial LBO 2/2 extraluminal compression of the sigmoid by the bladder cancer. Now s/p NGT, Rectal tube, failed bedside sigmoidoscopy and is now s/p transverse loop colostomy on 3/12. Palliative consulted for complex decision making regarding GOC.

## 2022-03-14 NOTE — CONSULT NOTE ADULT - PROBLEM SELECTOR RECOMMENDATION 9
CTa/p (3/12): Enlarging pelvic mass inseparable from the bladder and prostate and closely abutting the sigmoid colon. There is diffusely dilated small and large bowel to the level of the sigmoid colon, where there is suggestion   of soft tissue thickening and luminal narrowing. Neoplastic obstruction of the bowel from adjacent pelvic tumor is not excluded.  > s/p sigmoidoscopy on 3/13 but unsuccessful   > s/p transverse loop colostomy 3/13   > Pt with NGT in place. Management per SICU team.

## 2022-03-14 NOTE — PROGRESS NOTE ADULT - SUBJECTIVE AND OBJECTIVE BOX
POST ANESTHESIA EVALUATION    81y Male POSTOP DAY 1 S/P     MENTAL STATUS: Patient participation [  ] Awake     [ x ] Arousable     [  ] Sedated    AIRWAY PATENCY: [x  ] Satisfactory  [  ] Other:     Vital Signs Last 24 Hrs  T(C): 36.5 (14 Mar 2022 08:00), Max: 36.5 (14 Mar 2022 04:00)  T(F): 97.7 (14 Mar 2022 08:00), Max: 97.7 (14 Mar 2022 04:00)  HR: 87 (14 Mar 2022 09:00) (73 - 95)  BP: 118/54 (14 Mar 2022 09:00) (107/54 - 141/66)  BP(mean): 69 (14 Mar 2022 09:00) (64 - 90)  RR: 17 (14 Mar 2022 09:00) (17 - 27)  SpO2: 98% (14 Mar 2022 09:00) (90% - 100%)  I&O's Summary    13 Mar 2022 07:01  -  14 Mar 2022 07:00  --------------------------------------------------------  IN: 1625 mL / OUT: 1130 mL / NET: 495 mL    14 Mar 2022 07:01  -  14 Mar 2022 10:31  --------------------------------------------------------  IN: 350 mL / OUT: 50 mL / NET: 300 mL          NAUSEA/ VOMITTING:  [x  ] NONE  [  ] CONTROLLED [  ] OTHER     PAIN: [ x ] CONTROLLED WITH CURRENT REGIMEN  [  ] OTHER    [ x ] NO APPARENT ANESTHESIA COMPLICATIONS      Comments:

## 2022-03-14 NOTE — PROGRESS NOTE ADULT - ASSESSMENT
81 year old male with history of COPD (on home O2 @ 4L NC), Pneumothorax (1/22), Vit D deficiency, HTN (not on any meds), Bladder Ca unresectable s/p chemo and recently started on immunotherapy who presents with partial LBO 2/2 extraluminal compression of the sigmoid by the bladder cancer. Oral contrast seen at the rectum. Now s/p NGT, Rectal tube.      Plan:  - NPO  -NGT  -GI sigmoidoscopy to attempt decompression  - NGT/NPO/IVF  - Care per primary  -plan discussed with attending on call     A Team   [id number]   Patient is an 81 year old male with a PMHx of COPD (on home O2 @ 3L NC), pneumothorax (1/22), Vitamin D deficiency, HTN, HLD, bladder cancer (unresectable - S/P chemo - 6 cycles of carboplatin + gemcitabine - completed 12/2021 and recently started on immunotherapy x1 dose on 2/28/22) and cholecystectomy who presented with abdominal pain over the past month but had gotten worse over the past 2 days.  CT Abdomen / Pelvis performed showing enlarging pelvic mass inseparable from the bladder and prostate and closely abutting the sigmoid colon. There is diffusely dilated small and large bowel to the level of the sigmoid colon, where there is suggestion of soft tissue thickening and luminal narrowing.  Neoplastic obstruction of the bowel from adjacent pelvic tumor is not excluded.  Moderate bilateral hydronephrosis, nephroureteral stents in place.  Large calcified masses in the lung bases, unchanged.  Patient is now S/P transverse loop colostomy for LBO secondary to large compressing bladder mass on 3/13/22.      PLAN:  - NPO  - NGT  - Monitor ostomy output  - Hyperkalemia treatment  - Palliative consult  - Out of bed  - Pain control  - VTE prophylaxis with Lovenox subcutaneous  - Appreciate care per SICU      #[id number]  A Team Surgery
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
## 2022-03-14 NOTE — CONSULT NOTE ADULT - PROBLEM SELECTOR RECOMMENDATION 2
Patient follows with Dr. Willingham  Patient is s/p chemo (12/2021) and immunotherapy (last session 2/28). Per patient's wife, patient had 4 sessions scheduled and next session was planned for 3/21.   > Awaiting onc [facility] Blood and cancer specialist (per chart review from previous admission). Patient's wife states she called Dr. Willingham to inform him that patient was hospitalized.

## 2022-03-14 NOTE — PROGRESS NOTE ADULT - SUBJECTIVE AND OBJECTIVE BOX
SICU Daily Progress Note  =====================================================  82 y/o male, with a PmHx of COPD (on home O2 @ 3L NC), Pneumothorax (1/22), Vit D deficiency, HTN, HLD, Bladder Ca unresectable s/p chemo (6 cycles of carboplatin + gemcitabine - completed 12/2021) and recently started on immunotherapy x 1 dose on 2/28/22, Cholecystectomy, who presents to the [facility] ED with abd pain over the past month but had gotten worse over the past 2 days. Pt was recently treated at [facility] for a pneumothorax (1/15/22) and a UTI by PCP on 3/3/22 with Linezolid. Pt states he has been having worsening abd pain over the past month (lower abdominal area) but over the last 2 days states his stomach became distended and hasn't been able to have a BM. States last BM was 3 hrs prior to coming to the ED but only had minimal amount of stool that came out. pt states he had tried taking senna, miralax, lactulose without relief. Last time he passed gas was on Tuesday or Wednesday of this past week. Denies any fever, chills, chest pain, HA, dizziness, blurred vision, nausea or vomiting. Pt states the pain is mild 2-3/10 but is very distended making him feel uncomfortable. Pt states he is also having trouble urinating in which he doesn't always feel if his bladder is full and has trouble urinating when does void. In the [facility] ED today, he had a CT abd/Pelvis done that showed a partial SBO 2/2 extraluminal compression of the sigmoid by the bladder cancer. He was seen by colorectal surgery and no surgical intervention was recommended at this time. He appears comfortable at this time and is now being admitted to medicine for further medical management and treatment of the partial sbo and mild-moderate hydronephrosis. (12 Mar 2022 08:31)    SICU initially consulted for airway monitoring for planned bedside sigmoidoscopy that was ultimately unsuccessful and patient is planned for ostomy placement in OR.     24 hours events:   - s/p transverse loop colostomy for LBO 2/2 compressing bladder mass  -     MEDICATIONS:   --------------------------------------------------------------------------------------  Neurologic Medications  acetaminophen   IVPB .. 1000 milliGRAM(s) IV Intermittent every 6 hours  oxyCODONE    IR 5 milliGRAM(s) Oral every 4 hours PRN Severe Pain (7 - 10)  oxyCODONE    IR 2.5 milliGRAM(s) Oral every 4 hours PRN Moderate Pain (4 - 6)    Respiratory Medications  ALBUTerol    90 MICROgram(s) HFA Inhaler 2 Puff(s) Inhalation every 6 hours PRN Shortness of Breath and/or Wheezing  budesonide  80 MICROgram(s)/formoterol 4.5 MICROgram(s) Inhaler 2 Puff(s) Inhalation two times a day  tiotropium 18 MICROgram(s) Capsule 1 Capsule(s) Inhalation daily    Cardiovascular Medications  amLODIPine   Tablet 5 milliGRAM(s) Oral daily  tamsulosin 0.4 milliGRAM(s) Oral at bedtime    Gastrointestinal Medications  cholecalciferol 2000 Unit(s) Oral daily  cyanocobalamin 1000 MICROGram(s) Oral daily  lactated ringers. 1000 milliLiter(s) IV Continuous <Continuous>  pantoprazole  Injectable 40 milliGRAM(s) IV Push daily  sodium chloride 0.9%. 1000 milliLiter(s) IV Continuous <Continuous>    Genitourinary Medications    Hematologic/Oncologic Medications  heparin   Injectable 5000 Unit(s) SubCutaneous every 8 hours    Antimicrobial/Immunologic Medications    Endocrine/Metabolic Medications    Topical/Other Medications    --------------------------------------------------------------------------------------    VITAL SIGNS, INS/OUTS (last 24 hours):  --------------------------------------------------------------------------------------  ((Insert SICU Vitals/Is+Os here))***  --------------------------------------------------------------------------------------    EXAM  NEUROLOGY  RASS:   	GCS:    Exam: Normal, NAD, alert, oriented x3, no focal deficits. ***    HEENT  Exam: Normocephalic, atraumatic, EOMI.  ***    RESPIRATORY  Exam: Lungs clear to auscultation, Normal expansion/effort. ***  Mechanical Ventilation:     CARDIOVASCULAR  Exam: S1, S2.  Regular rate and rhythm.   ***    GI/NUTRITION  Exam: Abdomen soft, Non-tender, Non-distended.  ***  Wound:  ***  Current Diet:  NPO***    VASCULAR  Exam: Extremities warm, pink, well-perfused. ***    MUSCULOSKELETAL  Exam: All extremities moving spontaneously without limitations. ***    SKIN  Exam: Good skin turgor, no skin breakdown. ***    METABOLIC/FLUIDS/ELECTROLYTES  cholecalciferol 2000 Unit(s) Oral daily  cyanocobalamin 1000 MICROGram(s) Oral daily  lactated ringers. 1000 milliLiter(s) IV Continuous <Continuous>  sodium chloride 0.9%. 1000 milliLiter(s) IV Continuous <Continuous>      HEMATOLOGIC  [x] VTE Prophylaxis: heparin   Injectable 5000 Unit(s) SubCutaneous every 8 hours    Transfusions:	[] PRBC	[] Platelets		[] FFP	[] Cryoprecipitate    INFECTIOUS DISEASE  Antimicrobials/Immunologic Medications:    Day #      of     ***    Tubes/Lines/Drains  ***  [x] Peripheral IV  [] Central Venous Line     	[] R	[] L	[] IJ	[] Fem	[] SC	Date Placed:   [] Arterial Line		[] R	[] L	[] Fem	[] Rad	[] Ax	Date Placed:   [] PICC		[] Midline		[] Mediport  [] Urinary Catheter		Date Placed:   [x] Necessity of urinary, arterial, and venous catheters discussed    LABS  --------------------------------------------------------------------------------------  ((Insert SICU Labs here))***  --------------------------------------------------------------------------------------    OTHER LABORATORY:     IMAGING STUDIES:   CXR:     ASSESSMENT:  81y Male    ((insert from previous))***    PLAN:   ***  Neurologic:   Respiratory:   Cardiovascular:   Gastrointestinal/Nutrition:   Renal/Genitourinary:   Hematologic:   Infectious Disease:   Tubes/Lines/Drains:   Endocrine:   Disposition:     --------------------------------------------------------------------------------------    Critical Care Diagnoses: SICU Daily Progress Note  =====================================================  80 y/o male, with a PmHx of COPD (on home O2 @ 3L NC), Pneumothorax (), Vit D deficiency, HTN, HLD, Bladder Ca unresectable s/p chemo (6 cycles of carboplatin + gemcitabine - completed 2021) and recently started on immunotherapy x 1 dose on 22, Cholecystectomy, who presents to the [facility] ED with abd pain over the past month but had gotten worse over the past 2 days. Pt was recently treated at [facility] for a pneumothorax (1/15/22) and a UTI by PCP on 3/3/22 with Linezolid. Pt states he has been having worsening abd pain over the past month (lower abdominal area) but over the last 2 days states his stomach became distended and hasn't been able to have a BM. States last BM was 3 hrs prior to coming to the ED but only had minimal amount of stool that came out. pt states he had tried taking senna, miralax, lactulose without relief. Last time he passed gas was on Tuesday or Wednesday of this past week. Denies any fever, chills, chest pain, HA, dizziness, blurred vision, nausea or vomiting. Pt states the pain is mild 2-3/10 but is very distended making him feel uncomfortable. Pt states he is also having trouble urinating in which he doesn't always feel if his bladder is full and has trouble urinating when does void. In the [facility] ED today, he had a CT abd/Pelvis done that showed a partial SBO 2/2 extraluminal compression of the sigmoid by the bladder cancer. He was seen by colorectal surgery and no surgical intervention was recommended at this time. He appears comfortable at this time and is now being admitted to medicine for further medical management and treatment of the partial sbo and mild-moderate hydronephrosis. (12 Mar 2022 08:31)    SICU initially consulted for airway monitoring for planned bedside sigmoidoscopy that was ultimately unsuccessful and patient is planned for ostomy placement in OR.     24 hours events:   - s/p transverse loop colostomy for LBO 2/2 compressing bladder mass  - hyperkalemic, 5.5, shifted with insulin/dextrose.      MEDICATIONS:   --------------------------------------------------------------------------------------  Neurologic Medications  acetaminophen   IVPB .. 1000 milliGRAM(s) IV Intermittent every 6 hours  oxyCODONE    IR 5 milliGRAM(s) Oral every 4 hours PRN Severe Pain (7 - 10)  oxyCODONE    IR 2.5 milliGRAM(s) Oral every 4 hours PRN Moderate Pain (4 - 6)    Respiratory Medications  ALBUTerol    90 MICROgram(s) HFA Inhaler 2 Puff(s) Inhalation every 6 hours PRN Shortness of Breath and/or Wheezing  budesonide  80 MICROgram(s)/formoterol 4.5 MICROgram(s) Inhaler 2 Puff(s) Inhalation two times a day  tiotropium 18 MICROgram(s) Capsule 1 Capsule(s) Inhalation daily    Cardiovascular Medications  amLODIPine   Tablet 5 milliGRAM(s) Oral daily  tamsulosin 0.4 milliGRAM(s) Oral at bedtime    Gastrointestinal Medications  cholecalciferol 2000 Unit(s) Oral daily  cyanocobalamin 1000 MICROGram(s) Oral daily  lactated ringers. 1000 milliLiter(s) IV Continuous <Continuous>  pantoprazole  Injectable 40 milliGRAM(s) IV Push daily  sodium chloride 0.9%. 1000 milliLiter(s) IV Continuous <Continuous>    Genitourinary Medications    Hematologic/Oncologic Medications  heparin   Injectable 5000 Unit(s) SubCutaneous every 8 hours    Antimicrobial/Immunologic Medications    Endocrine/Metabolic Medications    Topical/Other Medications    --------------------------------------------------------------------------------------    VITAL SIGNS, INS/OUTS (last 24 hours):  --------------------------------------------------------------------------------------  Vital Signs Last 24 Hrs  T(C): 35.2 (13 Mar 2022 21:00), Max: 36.3 (13 Mar 2022 06:30)  T(F): 95.3 (13 Mar 2022 21:00), Max: 97.4 (13 Mar 2022 06:30)  HR: 84 (13 Mar 2022 22:59) (73 - 91)  BP: 119/58 (13 Mar 2022 22:00) (119/58 - 141/66)  BP(mean): 72 (13 Mar 2022 22:00) (68 - 90)  RR: 19 (13 Mar 2022 22:00) (17 - 27)  SpO2: 98% (13 Mar 2022 22:59) (90% - 100%)  --------------------------------------------------------------------------------------    EXAM  GENERAL: well appearing in no acute distress, non-toxic appearing  HEAD: normocephalic, atraumatic  HENT: airway intact  EYES normal conjunctiva, PERRL  CARDIAC: regular rate and rhythm, normal S1S2, no appreciable murmurs, 2+ pulses in UE/LE b/l  PULM: normal breath sounds, clear to ascultation bilaterally, no rales, rhonchi, wheezing  GI: Soft, distended, nttp, tympanic to percussion, no g/r. NGT in place. Rectal tube in place.   Extremities: warm, well perfused, Grossly symmetric, SCD's in place   NEURO: no focal motor or sensory deficits, AAOx3    METABOLIC/FLUIDS/ELECTROLYTES  cholecalciferol 2000 Unit(s) Oral daily  cyanocobalamin 1000 MICROGram(s) Oral daily  lactated ringers. 1000 milliLiter(s) IV Continuous <Continuous>  sodium chloride 0.9%. 1000 milliLiter(s) IV Continuous <Continuous>      HEMATOLOGIC  [x] VTE Prophylaxis: heparin   Injectable 5000 Unit(s) SubCutaneous every 8 hours    Transfusions:	[] PRBC	[] Platelets		[] FFP	[] Cryoprecipitate    INFECTIOUS DISEASE  Antimicrobials/Immunologic Medications:    Day #0     Tubes/Lines/Drains    [x] Peripheral IV  [] Central Venous Line     	[] R	[] L	[] IJ	[] Fem	[] SC	Date Placed:   [] Arterial Line		[] R	[] L	[] Fem	[] Rad	[] Ax	Date Placed:   [] PICC		[] Midline		[] Mediport  [x] Urinary Catheter		Date Placed:   [x] Necessity of urinary, arterial, and venous catheters discussed    LABS  --------------------------------------------------------------------------------------  CBC ( @ 21:58)                          11.6<L>                   8.46    )--------------(  295        --    % Neuts, --    % Lymphs, ANC: --                              38.6<L>  CBC ( @ 07:08)                          11.7<L>                   6.82    )--------------(  278        68.2  % Neuts, 12.8<L>% Lymphs, ANC: 4.65                            38.9<L>    BMP ( @ 21:58)       145     |  109<H>  |  28<H> 			Ca++ --      Ca 8.4          ---------------------------------( 99    		Mg 3.50<H>       5.5<H>  |  26      |  1.46<H>			Ph 3.2     BMP ( @ 10:58)       146<H>  |  107     |  28<H> 			Ca++ --      Ca 8.7          ---------------------------------( 100<H>		Mg 3.90<H>       5.1     |  27      |  1.51<H>			Ph 3.8       LFTs ( @ 07:08)      TPro 7.2 / Alb 3.1<L> / TBili 0.2 / DBili -- / AST 68<H> / ALT 61<H> / AlkPhos 68        ABG ( @ 21:58)      /  /  /  /  / %     Lactate:  1.5      Urinalysis ( @ 02:28):     Color:  / Appearance:  / SG:  / pH:  / Gluc:  / Ketones:  / Bili:  / Urobili:  / Protein : / Nitrites:  / Leuk.Est:  / RBC: 251<H> / WBC: 45<H> / Sq Epi:  / Non Sq Epi: 6<H> / Bacteria Few<!>     Urinalysis ( @ 02:12):     Color: Yellow / Appearance: Slightly Turbid<!> / S.015 / pH: 6.0 / Gluc: Negative / Ketones: Negative / Bili: Negative / Urobili: <2 mg/dL / Protein :100 mg/dL<!> / Nitrites: Negative / Leuk.Est: Large<!> / RBC:  / WBC:  / Sq Epi:  / Non Sq Epi:  / Bacteria          --------------------------------------------------------------------------------------    OTHER LABORATORY:     IMAGING STUDIES:   CXR:     ASSESSMENT:  81 year old male with history of COPD (on home O2 @ 4L NC), Pneumothorax (), Vit D deficiency, HTN (not on any meds), Bladder Ca unresectable s/p chemo and recently started on immunotherapy who presents with partial LBO 2/2 extraluminal compression of the sigmoid by the bladder cancer. Oral contrast seen at the rectum. Now s/p NGT, Rectal tube, failed bedside sigmoidoscopy and is now s/p     PLAN:    Neuro:  - Pain control: iv tylenol, oxy prn    Respiratory:  - Monitor respiratory status on RA    Cardiovascular:  - Monitor vitals signs q1h    Gastrointestinal:  - NGT LIWS  - Diet: NPO    Genitourinary/Renal:  - IVF @ 100cc/hr  - Monitor UOP q1h  - Hyperkalemic, shifted w/ insulin/dextrose. Monitor bmp.    Heme:  - Trend H/H on CBC qD  - VTE ppx: sqh q8hr  - SCDs while in bed    ID:  - Trend WBC on CBC qD  - Monitor fever curve    Endocrine:  - Trend FS q6/on BMP  - ISS    Lines:   - PIV x 2  - NGT  - Roberts    Code Status:  Dispo:  ---------    Critical Care Diagnoses:

## 2022-03-14 NOTE — GOALS OF CARE CONVERSATION - ADVANCED CARE PLANNING - CONVERSATION DETAILS
Referral for complex decision making regarding goc in setting of advanced malignancy. Introduced role of palliative care to patient and wife at bedside. SICU resident explained and answered wife's questions regarding current SICU stay and plan of care. Wife, Sotero, explained that patient was currently receiving immunotherapy with Dr. Willingham and patient is due for next session on 3/21. They are hopeful that patient will remain a candidate for further immunotherapy, but they are realistic about patient's current diagnosis as they are aware patient's bladder mass has increased in size.     Discussed patient's advance directives including previous code status during 11/2021 hospitalization. Mr. Gomez shared that he remembered completing and signing MOLST form designating himself as DNR/DNI. He re-affirmed that his wishes have not changed and agreed to complete MOLST form again. Wife at bedside expressed understanding and agreed with his wishes. Copies placed in chart and given to patient's wife.

## 2022-03-14 NOTE — CONSULT NOTE ADULT - PROBLEM SELECTOR RECOMMENDATION 3
Per chart review, patient had completed MOLST form 11/2021, but unable to find documentation in alpha.   Re-affirmed code status with patient and his wife this AM. Please see separate GOC note.   In summary, DNR/DNI, MOLST re-completed. Copies placed in chart and provided to wife at bedside.
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
## 2022-03-15 LAB
ANION GAP SERPL CALC-SCNC: 7 MMOL/L — SIGNIFICANT CHANGE UP (ref 7–14)
BUN SERPL-MCNC: 21 MG/DL — SIGNIFICANT CHANGE UP (ref 7–23)
CALCIUM SERPL-MCNC: 8.4 MG/DL — SIGNIFICANT CHANGE UP (ref 8.4–10.5)
CHLORIDE SERPL-SCNC: 109 MMOL/L — HIGH (ref 98–107)
CO2 SERPL-SCNC: 28 MMOL/L — SIGNIFICANT CHANGE UP (ref 22–31)
CREAT SERPL-MCNC: 1.32 MG/DL — HIGH (ref 0.5–1.3)
EGFR: 55 ML/MIN/1.73M2 — LOW
GLUCOSE SERPL-MCNC: 114 MG/DL — HIGH (ref 70–99)
HCT VFR BLD CALC: 31.8 % — LOW (ref 39–50)
HGB BLD-MCNC: 9.5 G/DL — LOW (ref 13–17)
MAGNESIUM SERPL-MCNC: 2.6 MG/DL — SIGNIFICANT CHANGE UP (ref 1.6–2.6)
MCHC RBC-ENTMCNC: 27.9 PG — SIGNIFICANT CHANGE UP (ref 27–34)
MCHC RBC-ENTMCNC: 29.9 GM/DL — LOW (ref 32–36)
MCV RBC AUTO: 93.5 FL — SIGNIFICANT CHANGE UP (ref 80–100)
NRBC # BLD: 0 /100 WBCS — SIGNIFICANT CHANGE UP
NRBC # FLD: 0 K/UL — SIGNIFICANT CHANGE UP
PHOSPHATE SERPL-MCNC: 2 MG/DL — LOW (ref 2.5–4.5)
PLATELET # BLD AUTO: 246 K/UL — SIGNIFICANT CHANGE UP (ref 150–400)
POTASSIUM SERPL-MCNC: 5.1 MMOL/L — SIGNIFICANT CHANGE UP (ref 3.5–5.3)
POTASSIUM SERPL-SCNC: 5.1 MMOL/L — SIGNIFICANT CHANGE UP (ref 3.5–5.3)
RBC # BLD: 3.4 M/UL — LOW (ref 4.2–5.8)
RBC # FLD: 18.2 % — HIGH (ref 10.3–14.5)
SODIUM SERPL-SCNC: 144 MMOL/L — SIGNIFICANT CHANGE UP (ref 135–145)
WBC # BLD: 6.33 K/UL — SIGNIFICANT CHANGE UP (ref 3.8–10.5)
WBC # FLD AUTO: 6.33 K/UL — SIGNIFICANT CHANGE UP (ref 3.8–10.5)

## 2022-03-15 PROCEDURE — 71045 X-RAY EXAM CHEST 1 VIEW: CPT | Mod: 26

## 2022-03-15 PROCEDURE — 99232 SBSQ HOSP IP/OBS MODERATE 35: CPT

## 2022-03-15 RX ORDER — OXYCODONE HYDROCHLORIDE 5 MG/1
5 TABLET ORAL EVERY 4 HOURS
Refills: 0 | Status: DISCONTINUED | OUTPATIENT
Start: 2022-03-15 | End: 2022-03-18

## 2022-03-15 RX ORDER — ACETAMINOPHEN 500 MG
975 TABLET ORAL EVERY 6 HOURS
Refills: 0 | Status: COMPLETED | OUTPATIENT
Start: 2022-03-15 | End: 2022-03-18

## 2022-03-15 RX ORDER — PANTOPRAZOLE SODIUM 20 MG/1
40 TABLET, DELAYED RELEASE ORAL
Refills: 0 | Status: DISCONTINUED | OUTPATIENT
Start: 2022-03-15 | End: 2022-03-18

## 2022-03-15 RX ORDER — OXYCODONE HYDROCHLORIDE 5 MG/1
10 TABLET ORAL EVERY 4 HOURS
Refills: 0 | Status: DISCONTINUED | OUTPATIENT
Start: 2022-03-15 | End: 2022-03-18

## 2022-03-15 RX ADMIN — PREGABALIN 1000 MICROGRAM(S): 225 CAPSULE ORAL at 12:27

## 2022-03-15 RX ADMIN — ENOXAPARIN SODIUM 30 MILLIGRAM(S): 100 INJECTION SUBCUTANEOUS at 21:12

## 2022-03-15 RX ADMIN — SODIUM CHLORIDE 50 MILLILITER(S): 9 INJECTION, SOLUTION INTRAVENOUS at 14:45

## 2022-03-15 RX ADMIN — PANTOPRAZOLE SODIUM 40 MILLIGRAM(S): 20 TABLET, DELAYED RELEASE ORAL at 05:43

## 2022-03-15 RX ADMIN — Medication 400 MILLIGRAM(S): at 05:44

## 2022-03-15 RX ADMIN — Medication 975 MILLIGRAM(S): at 12:42

## 2022-03-15 RX ADMIN — SODIUM ZIRCONIUM CYCLOSILICATE 5 GRAM(S): 10 POWDER, FOR SUSPENSION ORAL at 12:29

## 2022-03-15 RX ADMIN — Medication 85 MILLIMOLE(S): at 05:44

## 2022-03-15 RX ADMIN — Medication 975 MILLIGRAM(S): at 12:26

## 2022-03-15 RX ADMIN — TAMSULOSIN HYDROCHLORIDE 0.4 MILLIGRAM(S): 0.4 CAPSULE ORAL at 21:13

## 2022-03-15 RX ADMIN — Medication 2000 UNIT(S): at 12:26

## 2022-03-15 RX ADMIN — BUDESONIDE AND FORMOTEROL FUMARATE DIHYDRATE 2 PUFF(S): 160; 4.5 AEROSOL RESPIRATORY (INHALATION) at 09:19

## 2022-03-15 RX ADMIN — TIOTROPIUM BROMIDE 1 CAPSULE(S): 18 CAPSULE ORAL; RESPIRATORY (INHALATION) at 09:19

## 2022-03-15 RX ADMIN — PANTOPRAZOLE SODIUM 40 MILLIGRAM(S): 20 TABLET, DELAYED RELEASE ORAL at 12:26

## 2022-03-15 NOTE — PROGRESS NOTE ADULT - ATTENDING COMMENTS
I agree with the above detailed interval history, physical, and plan, which I have reviewed and edited where appropriate; also agree with notes/assessment and of the team on service.  I have personally examined the patient, reviewed all data.  The plan is specified below:  The patient is in SICU with diagnosis mentioned in the note.    The SICU team has a constant risk benefit analyzes discussion and coordinating care with the primary team, all consultants, House Staff and PA's.  I was physically present for the key portions of the evaluation and management (E/M) service provided.  81 year old male with history of COPD, Pneumothorax with partial LBO; extraluminal compression of the sigmoid by the bladder cancer.   no acute distress  CARDIAC: RR  PULM: clear  GI: Soft, distended, tympanic to percussion, no g/r.   Extremities: warm,   PLAN:    Neuro:  - tylenol,   Respiratory: respiratory abnormality  - RA  Cardiovascular:  - Monitor vitals signs   Gastrointestinal:  - Diet: NPO  Genitourinary/Renal:  - IVF @ 100cc/hr  Heme:  - VTE ppx: sqh q8hr  ID:  - Trend WBC   Endocrine:  - ISS  Code Status:  Dispo:
LBO 2/2 extrinsic compression from bladder cancer, failed attempted endoscopic decompression, now s/p surgical intervention with transverse loop ostomy 3/13/22.  Scant blood at ostomy site likely 2/2 recent surgical intervention.   No further GI interventions planned at this time.
Pt remains distended with increasing respiratory difficulty.     Plan: Decompression today.
I agree with the above detailed interval history, physical, and plan, which I have reviewed and edited where appropriate; also agree with notes/assessment and of the team on service.  I have personally examined the patient, reviewed all data.  The plan is specified below:  The patient is in SICU with diagnosis mentioned in the note.    The SICU team has a constant risk benefit analyzes discussion and coordinating care with the primary team, all consultants, House Staff and PA's.  I was physically present for the key portions of the evaluation and management (E/M) service provided.  81 year old male with history of COPD s/p transverse loop colostomy in SICU.  NEURO  RASS: 0    GCS: 15     CAM ICU: Negative  Exam: awake, alert, oriented x4,   RESPIRATORY  RR: 20   SpO2: 99%   Coarse bs  CARDIOVASCULAR  HR: 85   BP: 122/60   Exam: regular rate and rhythm  GI/NUTRITION  Exam: softly distended, christiano-incisional tenderness     PLAN:  Neuro: post-op pain control  - Tylenol  - Dilaudid  Respiratory: hx COPD   - albuterol inhaler, spiriva, symbicort  Cardiovascular: HTN  -  amlodipine 5mg daily  Gastrointestinal: mechanical LBO s/p transverse loop colosotmy   - NGT out  -Continue protonix  Genitourinary/Renal: refractory hyperkalemia   - D5 1/2 NS @ 100cc/hr, decrease to 50cc/hr  Heme:   - VTE ppx: Lovenox   ID:  - Monitor fever curve  Endocrine:   - Trend glucose on on BMP  Code Status: DNR/DNI  Dispo: Floor eligible
+/+  DC NGT  Clears

## 2022-03-15 NOTE — PROGRESS NOTE ADULT - ASSESSMENT
ASSESSMENT:  81 year old male with history of COPD (on home O2 @ 4L NC), Pneumothorax (1/22), Vit D deficiency, HTN (not on any meds), Bladder Ca unresectable s/p chemo and recently started on immunotherapy who presents with partial LBO 2/2 extraluminal compression of the sigmoid by the bladder cancer. Oral contrast seen at the rectum. Now s/p NGT, Rectal tube, failed bedside sigmoidoscopy and is now s/p transvers loop colostomy 03/13    PLAN:  Neuro: post-op pain control  - standing IV tylenol  - PRN Dilaudid    Respiratory: hx COPD (on 4LNC)  - Monitor respiratory status on RA  - Continue home albuterol inhaler, spiriva, symbicort  - AM CXR    Cardiovascular: hx HTN  - continue home amlodipine 5mg daily  - Monitor vitals signs q1h    Gastrointestinal: mechanical LBO s/p transverse loop colosotmy 03/13  - NGT LCWS  - Diet: NPO  - Monitor ostomy output  - Continue protonix    Genitourinary/Renal: refractory hyperkalemia s/p shifting hx BPH  - D5 1/2 NS @ 100cc/hr  - Monitor UOP q1h  - Q6H BMP  - Continue Lokelma  - Continue Flomax  - Continue Roberts    Heme:   - Trend H/H on CBC qD  - VTE ppx: Lovenox (renally dosed)  - SCDs while in bed    ID: no active issues  - Trend WBC on CBC qD  - Monitor fever curve    Endocrine: no active issues  - Trend glucose on on BMP    Lines:   - PIV x 2  - NGT  - Roberts    Code Status: DNR/DNI  Dispo: SICU   ASSESSMENT:  81 year old male with history of COPD (on home O2 @ 4L NC), Pneumothorax (1/22), Vit D deficiency, HTN (not on any meds), Bladder Ca unresectable s/p chemo and recently started on immunotherapy who presents with partial LBO 2/2 extraluminal compression of the sigmoid by the bladder cancer. Oral contrast seen at the rectum. Now s/p NGT, Rectal tube, failed bedside sigmoidoscopy and is now s/p transvers loop colostomy 03/13    PLAN:  Neuro: post-op pain control  - standing IV tylenol  - PRN Dilaudid    Respiratory: hx COPD (on 4LNC)  - Monitor respiratory status on RA  - Continue home albuterol inhaler, spiriva, symbicort  - AM CXR    Cardiovascular: hx HTN  - continue home amlodipine 5mg daily  - Monitor vitals signs q1h    Gastrointestinal: mechanical LBO s/p transverse loop colosotmy 03/13  - NGT out  - Diet: CLD  - Monitor ostomy output  - Continue protonix    Genitourinary/Renal: refractory hyperkalemia s/p shifting hx BPH  - D5 1/2 NS @ 100cc/hr, decrease to 50cc/hr  - Monitor UOP q1h  - Q24H BMP  - Continue Lokelma  - Continue Flomax  - Continue Roberts    Heme:   - Trend H/H on CBC qD  - VTE ppx: Lovenox (renally dosed)  - SCDs while in bed    ID: no active issues  - Trend WBC on CBC qD  - Monitor fever curve    Endocrine: no active issues  - Trend glucose on on BMP    Lines:   - PIV x 2  - NGT  - Roberts    Code Status: DNR/DNI  Dispo: Floor eligible
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
## 2022-03-15 NOTE — PROGRESS NOTE ADULT - SUBJECTIVE AND OBJECTIVE BOX
HISTORY  80 y/o male, with a PmHx of COPD (on home O2 @ 3L NC), Pneumothorax (1/22), Vit D deficiency, HTN, HLD, Bladder Ca unresectable s/p chemo (6 cycles of carboplatin + gemcitabine - completed 12/2021) and recently started on immunotherapy x 1 dose on 2/28/22, Cholecystectomy, who presents to the [facility] ED with abd pain over the past month but had gotten worse over the past 2 days. Pt was recently treated at [facility] for a pneumothorax (1/15/22) and a UTI by PCP on 3/3/22 with Linezolid. Pt states he has been having worsening abd pain over the past month (lower abdominal area) but over the last 2 days states his stomach became distended and hasn't been able to have a BM. States last BM was 3 hrs prior to coming to the ED but only had minimal amount of stool that came out. pt states he had tried taking senna, miralax, lactulose without relief. Last time he passed gas was on Tuesday or Wednesday of this past week. Denies any fever, chills, chest pain, HA, dizziness, blurred vision, nausea or vomiting. Pt states the pain is mild 2-3/10 but is very distended making him feel uncomfortable. Pt states he is also having trouble urinating in which he doesn't always feel if his bladder is full and has trouble urinating when does void. In the [facility] ED today, he had a CT abd/Pelvis done that showed a partial SBO 2/2 extraluminal compression of the sigmoid by the bladder cancer. He was seen by colorectal surgery and no surgical intervention was recommended at this time. He appears comfortable at this time and is now being admitted to medicine for further medical management and treatment of the partial sbo and mild-moderate hydronephrosis. (12 Mar 2022 08:31)    SICU initially consulted for airway monitoring for planned bedside sigmoidoscopy that was ultimately unsuccessful, now s/p transverse loop colostomy 03/13 with refractory hyperkalemia.      Patient currently denies headache, dizziness, weakness, fevers, chills, shortness of breath, chest pain, abdominal pain, or nausea/vomiting.    24 HOUR EVENTS:  - Hyperkalemic, shifted multiple times with insulin, dextrose, albuterol  - s/p 250cc bolus 5% albumin x2  - DNR/DNI status confirmed  - Started Lokelma  - SQH switched to lovenox   - NGT clogged, replaced      SUBJECTIVE/ROS:  A ten-point review of systems was otherwise negative except as noted.    NEURO  RASS: 0    GCS: 15     CAM ICU: Negative  Exam: awake, alert, oriented x4, no acute distress, no acute focal deficits,  follows complex commands,moving all four extremities  Meds: acetaminophen   IVPB .. 1000 milliGRAM(s) IV Intermittent every 6 hours  HYDROmorphone  Injectable 0.25 milliGRAM(s) IV Push every 4 hours PRN Moderate Pain (4 - 6)  HYDROmorphone  Injectable 0.5 milliGRAM(s) IV Push every 4 hours PRN Severe Pain (7 - 10)      RESPIRATORY  RR: 20 (03-14-22 @ 23:00) (16 - 23)  SpO2: 99% (03-14-22 @ 23:00) (95% - 100%)  Wt(kg): --  Exam: breath sound equal bilaterally  Mechanical Ventilation:   ABG - ( 13 Mar 2022 21:58 )  pH: x     /  pCO2: x     /  pO2: x     / HCO3: x     / Base Excess: x     /  SaO2: x       Lactate: 1.5      Meds: ALBUTerol    90 MICROgram(s) HFA Inhaler 2 Puff(s) Inhalation every 6 hours PRN Shortness of Breath and/or Wheezing  budesonide  80 MICROgram(s)/formoterol 4.5 MICROgram(s) Inhaler 2 Puff(s) Inhalation two times a day  tiotropium 18 MICROgram(s) Capsule 1 Capsule(s) Inhalation daily      CARDIOVASCULAR  HR: 85 (03-14-22 @ 23:00) (77 - 95)  BP: 122/60 (03-14-22 @ 23:00) (101/54 - 140/51)  BP(mean): 73 (03-14-22 @ 23:00) (63 - 102)  ABP: --  ABP(mean): --  Wt(kg): --  CVP(cm H2O): --  VBG - ( 14 Mar 2022 06:28 )  pH: 7.37  /  pCO2: 49    /  pO2: 55    / HCO3: 28    / Base Excess: 2.5   /  SaO2: 88.4   Lactate: 1.4          Exam: regular rate and rhythm  Cardiac Rhythm: sinus  Perfusion     [x]Adequate   [ ]Inadequate  Mentation   [x]Normal       [ ]Reduced  Extremities  [x]Warm         [ ]Cool  Volume Status [ ]Hypervolemic [x]Euvolemic [ ]Hypovolemic  Meds: amLODIPine   Tablet 5 milliGRAM(s) Oral daily  tamsulosin 0.4 milliGRAM(s) Oral at bedtime      GI/NUTRITION  Exam: softly distended, christiano-incisional tenderness, ostomy with red rubber in place and gas and stool in bag  Diet: NPO  Meds: pantoprazole  Injectable 40 milliGRAM(s) IV Push two times a day      GENITOURINARY  I&O's Detail    03-13 @ 07:01 - 03-14 @ 07:00  --------------------------------------------------------  IN:    dextrose 5% + lactated ringers: 300 mL    IV PiggyBack: 200 mL    Lactated Ringers: 600 mL    sodium chloride 0.9%: 525 mL  Total IN: 1625 mL    OUT:    Colostomy (mL): 30 mL    Indwelling Catheter - Urethral (mL): 500 mL    Nasogastric/Oral tube (mL): 0 mL    Voided (mL): 600 mL  Total OUT: 1130 mL    Total NET: 495 mL      03-14 @ 07:01  -  03-15 @ 00:48  --------------------------------------------------------  IN:    dextrose 5% + lactated ringers: 500 mL    dextrose 5% + sodium chloride 0.45%: 1100 mL    IV PiggyBack: 600 mL  Total IN: 2200 mL    OUT:    Colostomy (mL): 25 mL    Indwelling Catheter - Urethral (mL): 1310 mL  Total OUT: 1335 mL    Total NET: 865 mL          03-14    147<H>  |  110<H>  |  23  ----------------------------<  129<H>  5.2   |  29  |  1.42<H>    Ca    8.6      14 Mar 2022 18:23  Phos  2.6     03-14  Mg     2.90     03-14    TPro  7.2  /  Alb  3.1<L>  /  TBili  0.2  /  DBili  x   /  AST  68<H>  /  ALT  61<H>  /  AlkPhos  68  03-13    [x] Roberts catheter, indication: periop UOP monitoring  Meds: cholecalciferol 2000 Unit(s) Oral daily  cyanocobalamin 1000 MICROGram(s) Oral daily  dextrose 5% + sodium chloride 0.45%. 1000 milliLiter(s) IV Continuous <Continuous>      HEMATOLOGIC  Meds: enoxaparin Injectable 30 milliGRAM(s) SubCutaneous every 24 hours                          9.2    6.15  )-----------( 270      ( 14 Mar 2022 18:23 )             31.1     PT/INR - ( 14 Mar 2022 02:34 )   PT: 14.1 sec;   INR: 1.21 ratio         PTT - ( 14 Mar 2022 02:34 )  PTT:36.1 sec    INFECTIOUS DISEASES  T(C): 36 (03-14-22 @ 20:00), Max: 36.6 (03-14-22 @ 12:00)  Wt(kg): --  WBC Count: 6.15 K/uL (03-14 @ 18:23)  WBC Count: 5.56 K/uL (03-14 @ 11:43)  WBC Count: 9.69 K/uL (03-14 @ 02:34)    Recent Cultures:  Specimen Source: Clean Catch Clean Catch (Midstream), 03-12 @ 02:20; Results   >=3 organisms. Probable collection contamination.; Gram Stain: --; Organism: --    Meds:     ENDOCRINE  Capillary Blood Glucose      ACCESS DEVICES:  [x] Peripheral IV  [ ] Central Venous Line	[ ] R	[ ] L	[ ] IJ	[ ] Fem	[ ] SC	Placed:   [ ] Arterial Line		[ ] R	[ ] L	[ ] Fem	[ ] Rad	[ ] Ax	Placed:   [ ] PICC:					[ ] Mediport  [x] Urinary Catheter, Date Placed: 03/13/2022  [x] Necessity of urinary, arterial, and venous catheters discussed    OTHER MEDICATIONS:  sodium zirconium cyclosilicate 5 Gram(s) Oral daily

## 2022-03-15 NOTE — PROGRESS NOTE ADULT - ASSESSMENT
Patient is an 81 year old male with a PMHx of COPD (on home O2 @ 3L NC), pneumothorax (1/22), Vitamin D deficiency, HTN, HLD, bladder cancer (unresectable - S/P chemo - 6 cycles of carboplatin + gemcitabine - completed 12/2021 and recently started on immunotherapy x1 dose on 2/28/22) and cholecystectomy who presented with abdominal pain over the past month but had gotten worse over the past 2 days.  CT Abdomen / Pelvis performed showing enlarging pelvic mass inseparable from the bladder and prostate and closely abutting the sigmoid colon. There is diffusely dilated small and large bowel to the level of the sigmoid colon, where there is suggestion of soft tissue thickening and luminal narrowing.  Neoplastic obstruction of the bowel from adjacent pelvic tumor is not excluded.  Moderate bilateral hydronephrosis, nephroureteral stents in place.  Large calcified masses in the lung bases, unchanged.  Patient is now S/P transverse loop colostomy for LBO secondary to large compressing bladder mass on 3/13/22.      PLAN:  - NPO  - NGT  - Monitor ostomy output  - Hyperkalemia treatment  - Palliative consult  - Out of bed  - Pain control  - VTE prophylaxis with Lovenox subcutaneous  - Appreciate care per SICU      #[id number]  A Team Surgery Patient is an 81 year old male with a PMHx of COPD (on home O2 @ 3L NC), pneumothorax (1/22), Vitamin D deficiency, HTN, HLD, bladder cancer (unresectable - S/P chemo - 6 cycles of carboplatin + gemcitabine - completed 12/2021 and recently started on immunotherapy x1 dose on 2/28/22) and cholecystectomy who presented with abdominal pain over the past month but had gotten worse over the past 2 days.  CT Abdomen / Pelvis performed showing enlarging pelvic mass inseparable from the bladder and prostate and closely abutting the sigmoid colon. There is diffusely dilated small and large bowel to the level of the sigmoid colon, where there is suggestion of soft tissue thickening and luminal narrowing.  Neoplastic obstruction of the bowel from adjacent pelvic tumor is not excluded.  Moderate bilateral hydronephrosis, nephroureteral stents in place.  Large calcified masses in the lung bases, unchanged.  Patient is now S/P transverse loop colostomy for LBO secondary to large compressing bladder mass on 3/13/22.      PLAN:  - NPO  - NGT  - Monitor ostomy output  - Palliative consult appreciated  - Out of bed  - Pain control  - VTE prophylaxis with Lovenox subcutaneous  - Appreciate care per SICU      #[id number]  A Team Surgery

## 2022-03-16 LAB
ANION GAP SERPL CALC-SCNC: 8 MMOL/L — SIGNIFICANT CHANGE UP (ref 7–14)
BLD GP AB SCN SERPL QL: NEGATIVE — SIGNIFICANT CHANGE UP
BUN SERPL-MCNC: 14 MG/DL — SIGNIFICANT CHANGE UP (ref 7–23)
CALCIUM SERPL-MCNC: 8.4 MG/DL — SIGNIFICANT CHANGE UP (ref 8.4–10.5)
CHLORIDE SERPL-SCNC: 107 MMOL/L — SIGNIFICANT CHANGE UP (ref 98–107)
CO2 SERPL-SCNC: 29 MMOL/L — SIGNIFICANT CHANGE UP (ref 22–31)
CREAT SERPL-MCNC: 1.11 MG/DL — SIGNIFICANT CHANGE UP (ref 0.5–1.3)
EGFR: 67 ML/MIN/1.73M2 — SIGNIFICANT CHANGE UP
GLUCOSE SERPL-MCNC: 88 MG/DL — SIGNIFICANT CHANGE UP (ref 70–99)
HCT VFR BLD CALC: 31.7 % — LOW (ref 39–50)
HGB BLD-MCNC: 10.1 G/DL — LOW (ref 13–17)
MAGNESIUM SERPL-MCNC: 2.2 MG/DL — SIGNIFICANT CHANGE UP (ref 1.6–2.6)
MCHC RBC-ENTMCNC: 29 PG — SIGNIFICANT CHANGE UP (ref 27–34)
MCHC RBC-ENTMCNC: 31.9 GM/DL — LOW (ref 32–36)
MCV RBC AUTO: 91.1 FL — SIGNIFICANT CHANGE UP (ref 80–100)
NRBC # BLD: 0 /100 WBCS — SIGNIFICANT CHANGE UP
NRBC # FLD: 0 K/UL — SIGNIFICANT CHANGE UP
PHOSPHATE SERPL-MCNC: 2.5 MG/DL — SIGNIFICANT CHANGE UP (ref 2.5–4.5)
PLATELET # BLD AUTO: 266 K/UL — SIGNIFICANT CHANGE UP (ref 150–400)
POTASSIUM SERPL-MCNC: 4.6 MMOL/L — SIGNIFICANT CHANGE UP (ref 3.5–5.3)
POTASSIUM SERPL-SCNC: 4.6 MMOL/L — SIGNIFICANT CHANGE UP (ref 3.5–5.3)
RBC # BLD: 3.48 M/UL — LOW (ref 4.2–5.8)
RBC # FLD: 17.9 % — HIGH (ref 10.3–14.5)
RH IG SCN BLD-IMP: POSITIVE — SIGNIFICANT CHANGE UP
SODIUM SERPL-SCNC: 144 MMOL/L — SIGNIFICANT CHANGE UP (ref 135–145)
WBC # BLD: 5.44 K/UL — SIGNIFICANT CHANGE UP (ref 3.8–10.5)
WBC # FLD AUTO: 5.44 K/UL — SIGNIFICANT CHANGE UP (ref 3.8–10.5)

## 2022-03-16 RX ORDER — ENOXAPARIN SODIUM 100 MG/ML
40 INJECTION SUBCUTANEOUS EVERY 24 HOURS
Refills: 0 | Status: DISCONTINUED | OUTPATIENT
Start: 2022-03-16 | End: 2022-03-18

## 2022-03-16 RX ADMIN — PANTOPRAZOLE SODIUM 40 MILLIGRAM(S): 20 TABLET, DELAYED RELEASE ORAL at 05:14

## 2022-03-16 RX ADMIN — Medication 2000 UNIT(S): at 11:36

## 2022-03-16 RX ADMIN — TIOTROPIUM BROMIDE 1 CAPSULE(S): 18 CAPSULE ORAL; RESPIRATORY (INHALATION) at 13:08

## 2022-03-16 RX ADMIN — TAMSULOSIN HYDROCHLORIDE 0.4 MILLIGRAM(S): 0.4 CAPSULE ORAL at 21:03

## 2022-03-16 RX ADMIN — SODIUM ZIRCONIUM CYCLOSILICATE 5 GRAM(S): 10 POWDER, FOR SUSPENSION ORAL at 14:42

## 2022-03-16 RX ADMIN — ENOXAPARIN SODIUM 40 MILLIGRAM(S): 100 INJECTION SUBCUTANEOUS at 11:36

## 2022-03-16 RX ADMIN — PREGABALIN 1000 MICROGRAM(S): 225 CAPSULE ORAL at 13:05

## 2022-03-16 RX ADMIN — BUDESONIDE AND FORMOTEROL FUMARATE DIHYDRATE 2 PUFF(S): 160; 4.5 AEROSOL RESPIRATORY (INHALATION) at 21:02

## 2022-03-16 RX ADMIN — BUDESONIDE AND FORMOTEROL FUMARATE DIHYDRATE 2 PUFF(S): 160; 4.5 AEROSOL RESPIRATORY (INHALATION) at 09:53
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
## 2022-03-16 NOTE — PHYSICAL THERAPY INITIAL EVALUATION ADULT - ADDITIONAL COMMENTS
Patient lives with his wife in apartment, + elevator access. Patient was independent in all ADL prior to admission. Patient was not using assistive device prior to admission. There are 3 steps to enter the building and no steps to get into apartment.

## 2022-03-16 NOTE — CONSULT NOTE ADULT - ASSESSMENT
Hematology/Oncology consulted on this patient known to [facility] and follows with Dr Willingham for muscle invasive bladder cancer last completed treatment with Carbo/Mobile 12/21 who was previously admitted 1/15/22 with dyspnea and found to have a small to moderate left pneumothorax, he was discharged on 1/17 without surgical intervention and improvement in Pneumothorax. He was readmitted on 3/11 with abdominal pain and constipation, found to have a small bowel obstruction/Ileus.     Muscle invasive bladder cancer  -- Patient has completed 6 cycles of carboplatin + gemcitabine. He is not on active treatment at this time.  - pleural growth/lung mass biopsied outpatient.  - Pt will follow-up with Dr. Willingham at [facility] after discharge    SBO/Ileus  -CT Abdomen / Pelvis completed  Impression   showing enlarging pelvic mass inseparable from the bladder and prostate and closely abutting the sigmoid colon. There is diffusely dilated small and large bowel to the level of the sigmoid colon, where there is suggestion of soft tissue thickening and luminal narrowing.  Neoplastic obstruction of the bowel from adjacent pelvic tumor is not excluded.  Moderate bilateral hydronephrosis, nephroureteral stents in place.  Large calcified masses in the lung bases, unchanged.   -3/13  S/P transverse loop colostomy for LBO secondary to large compressing bladder mass   -Tolerating clears    Post op pain  --Managed on Oxycodone      GO  --Palliative care consulted, Patient remains DNR/DNI    Patient will follow with Dr Willingham at [facility] after discharge    Diane Rothman NP  Hematology/Oncology  [facility]  [phone] (Office)  [phone] (Alt office)  Evenings and weekends please call MD on call or office

## 2022-03-16 NOTE — PHYSICAL THERAPY INITIAL EVALUATION ADULT - PERTINENT HX OF CURRENT PROBLEM, REHAB EVAL
80 y/o male, with a PmHx of COPD (on home O2 @ 3L NC), Pneumothorax (1/22), Vit D deficiency, HTN, HLD, Bladder Ca unresectable s/p chemo (6 cycles of carboplatin + gemcitabine - completed 12/2021) and recently started on immunotherapy x 1 dose on 2/28/22, Cholecystectomy, who presents to the [facility] ED with abd pain over the past month but had gotten worse over the past 2 days. Admitted to medicine for partial SBO 2/2 extraluminal compression of the sigmoid by the bladder cancer.
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
## 2022-03-16 NOTE — PROGRESS NOTE ADULT - ASSESSMENT
Patient is an 81 year old male with a PMHx of COPD (on home O2 @ 3L NC), pneumothorax (1/22), Vitamin D deficiency, HTN, HLD, bladder cancer (unresectable - S/P chemo - 6 cycles of carboplatin + gemcitabine - completed 12/2021 and recently started on immunotherapy x1 dose on 2/28/22) and cholecystectomy who presented with abdominal pain over the past month but had gotten worse over the past 2 days.  CT Abdomen / Pelvis performed showing enlarging pelvic mass inseparable from the bladder and prostate and closely abutting the sigmoid colon. There is diffusely dilated small and large bowel to the level of the sigmoid colon, where there is suggestion of soft tissue thickening and luminal narrowing.  Neoplastic obstruction of the bowel from adjacent pelvic tumor is not excluded.  Moderate bilateral hydronephrosis, nephroureteral stents in place.  Large calcified masses in the lung bases, unchanged.  Patient is now S/P transverse loop colostomy for LBO secondary to large compressing bladder mass on 3/13/22.      PLAN:  - NPO  - NGT  - Monitor ostomy output  - Palliative consult appreciated  - Out of bed  - Pain control  - VTE prophylaxis with Lovenox subcutaneous  - Appreciate care per SICU      #[id number]  A Team Surgery Patient is an 81 year old male with a PMHx of COPD (on home O2 @ 3L NC), pneumothorax (1/22), Vitamin D deficiency, HTN, HLD, bladder cancer (unresectable - S/P chemo - 6 cycles of carboplatin + gemcitabine - completed 12/2021 and recently started on immunotherapy x1 dose on 2/28/22) and cholecystectomy who presented with abdominal pain over the past month but had gotten worse over the past 2 days.  CT Abdomen / Pelvis performed showing enlarging pelvic mass inseparable from the bladder and prostate and closely abutting the sigmoid colon. There is diffusely dilated small and large bowel to the level of the sigmoid colon, where there is suggestion of soft tissue thickening and luminal narrowing.  Neoplastic obstruction of the bowel from adjacent pelvic tumor is not excluded.  Moderate bilateral hydronephrosis, nephroureteral stents in place.  Large calcified masses in the lung bases, unchanged.  Patient is now S/P transverse loop colostomy for LBO secondary to large compressing bladder mass on 3/13/22.      PLAN:  - Clear liquid diet  - Monitor ostomy output  - Remove wilson catheter  - Trial of void  - Palliative consult appreciated  - Out of bed  - Pain control  - VTE prophylaxis with Lovenox subcutaneous      #[id number]  A Team Surgery

## 2022-03-17 LAB
ANION GAP SERPL CALC-SCNC: 8 MMOL/L — SIGNIFICANT CHANGE UP (ref 7–14)
BUN SERPL-MCNC: 11 MG/DL — SIGNIFICANT CHANGE UP (ref 7–23)
CALCIUM SERPL-MCNC: 8.4 MG/DL — SIGNIFICANT CHANGE UP (ref 8.4–10.5)
CHLORIDE SERPL-SCNC: 106 MMOL/L — SIGNIFICANT CHANGE UP (ref 98–107)
CO2 SERPL-SCNC: 28 MMOL/L — SIGNIFICANT CHANGE UP (ref 22–31)
CREAT SERPL-MCNC: 1.14 MG/DL — SIGNIFICANT CHANGE UP (ref 0.5–1.3)
EGFR: 65 ML/MIN/1.73M2 — SIGNIFICANT CHANGE UP
GLUCOSE SERPL-MCNC: 86 MG/DL — SIGNIFICANT CHANGE UP (ref 70–99)
HCT VFR BLD CALC: 31.4 % — LOW (ref 39–50)
HGB BLD-MCNC: 9.6 G/DL — LOW (ref 13–17)
MAGNESIUM SERPL-MCNC: 2 MG/DL — SIGNIFICANT CHANGE UP (ref 1.6–2.6)
MCHC RBC-ENTMCNC: 28 PG — SIGNIFICANT CHANGE UP (ref 27–34)
MCHC RBC-ENTMCNC: 30.6 GM/DL — LOW (ref 32–36)
MCV RBC AUTO: 91.5 FL — SIGNIFICANT CHANGE UP (ref 80–100)
NRBC # BLD: 0 /100 WBCS — SIGNIFICANT CHANGE UP
NRBC # FLD: 0 K/UL — SIGNIFICANT CHANGE UP
PHOSPHATE SERPL-MCNC: 2.3 MG/DL — LOW (ref 2.5–4.5)
PLATELET # BLD AUTO: 277 K/UL — SIGNIFICANT CHANGE UP (ref 150–400)
POTASSIUM SERPL-MCNC: 4.1 MMOL/L — SIGNIFICANT CHANGE UP (ref 3.5–5.3)
POTASSIUM SERPL-SCNC: 4.1 MMOL/L — SIGNIFICANT CHANGE UP (ref 3.5–5.3)
RBC # BLD: 3.43 M/UL — LOW (ref 4.2–5.8)
RBC # FLD: 17.8 % — HIGH (ref 10.3–14.5)
SODIUM SERPL-SCNC: 142 MMOL/L — SIGNIFICANT CHANGE UP (ref 135–145)
WBC # BLD: 5.17 K/UL — SIGNIFICANT CHANGE UP (ref 3.8–10.5)
WBC # FLD AUTO: 5.17 K/UL — SIGNIFICANT CHANGE UP (ref 3.8–10.5)

## 2022-03-17 PROCEDURE — 99221 1ST HOSP IP/OBS SF/LOW 40: CPT

## 2022-03-17 RX ORDER — SODIUM,POTASSIUM PHOSPHATES 278-250MG
1 POWDER IN PACKET (EA) ORAL
Refills: 0 | Status: COMPLETED | OUTPATIENT
Start: 2022-03-17 | End: 2022-03-17

## 2022-03-17 RX ADMIN — Medication 1 TABLET(S): at 11:50

## 2022-03-17 RX ADMIN — PREGABALIN 1000 MICROGRAM(S): 225 CAPSULE ORAL at 13:19

## 2022-03-17 RX ADMIN — Medication 2000 UNIT(S): at 11:50

## 2022-03-17 RX ADMIN — Medication 975 MILLIGRAM(S): at 11:50

## 2022-03-17 RX ADMIN — TAMSULOSIN HYDROCHLORIDE 0.4 MILLIGRAM(S): 0.4 CAPSULE ORAL at 21:14

## 2022-03-17 RX ADMIN — BUDESONIDE AND FORMOTEROL FUMARATE DIHYDRATE 2 PUFF(S): 160; 4.5 AEROSOL RESPIRATORY (INHALATION) at 10:12

## 2022-03-17 RX ADMIN — ENOXAPARIN SODIUM 40 MILLIGRAM(S): 100 INJECTION SUBCUTANEOUS at 11:54

## 2022-03-17 RX ADMIN — PANTOPRAZOLE SODIUM 40 MILLIGRAM(S): 20 TABLET, DELAYED RELEASE ORAL at 05:04

## 2022-03-17 RX ADMIN — TIOTROPIUM BROMIDE 1 CAPSULE(S): 18 CAPSULE ORAL; RESPIRATORY (INHALATION) at 10:06

## 2022-03-17 RX ADMIN — BUDESONIDE AND FORMOTEROL FUMARATE DIHYDRATE 2 PUFF(S): 160; 4.5 AEROSOL RESPIRATORY (INHALATION) at 21:16

## 2022-03-17 RX ADMIN — AMLODIPINE BESYLATE 5 MILLIGRAM(S): 2.5 TABLET ORAL at 10:06

## 2022-03-17 RX ADMIN — Medication 1 TABLET(S): at 16:34

## 2022-03-17 RX ADMIN — Medication 975 MILLIGRAM(S): at 16:21

## 2022-03-17 RX ADMIN — Medication 1 TABLET(S): at 21:14

## 2022-03-17 RX ADMIN — SODIUM ZIRCONIUM CYCLOSILICATE 5 GRAM(S): 10 POWDER, FOR SUSPENSION ORAL at 13:20

## 2022-03-17 RX ADMIN — Medication 1 TABLET(S): at 09:53

## 2022-03-17 NOTE — PROGRESS NOTE ADULT - ASSESSMENT
Patient is an 81 year old male with a PMHx of COPD (on home O2 @ 3L NC), pneumothorax (1/22), Vitamin D deficiency, HTN, HLD, bladder cancer (unresectable - S/P chemo - 6 cycles of carboplatin + gemcitabine - completed 12/2021 and recently started on immunotherapy x1 dose on 2/28/22) and cholecystectomy who presented with abdominal pain over the past month but had gotten worse over the past 2 days.  CT Abdomen / Pelvis performed showing enlarging pelvic mass inseparable from the bladder and prostate and closely abutting the sigmoid colon. There is diffusely dilated small and large bowel to the level of the sigmoid colon, where there is suggestion of soft tissue thickening and luminal narrowing.  Neoplastic obstruction of the bowel from adjacent pelvic tumor is not excluded.  Moderate bilateral hydronephrosis, nephroureteral stents in place.  Large calcified masses in the lung bases, unchanged.  Patient is now S/P transverse loop colostomy for LBO secondary to large compressing bladder mass on 3/13/22.      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  Moderate bilateral hydronephrosis, nephroureteral stents in place.  Large calcified masses in the lung bases, unchanged.  Now S/P transverse loop colostomy for LBO secondary to large compressing bladder mass on 3/13/22.      PLAN:  - regular diet  - Monitor ostomy output  -ostomy teaching  - Palliative consult appreciated  - Out of bed  - Pain control  - VTE prophylaxis with Lovenox subcutaneous  -plan to dc home tomorrow      #[id number]  A Team Surgery

## 2022-03-17 NOTE — PROGRESS NOTE ADULT - SUBJECTIVE AND OBJECTIVE BOX
Patient is a 81y old  Male who presents with a chief complaint of Abd Pain/Distension (17 Mar 2022 00:27)      MEDICATIONS  (STANDING):  acetaminophen     Tablet .. 975 milliGRAM(s) Oral every 6 hours  amLODIPine   Tablet 5 milliGRAM(s) Oral daily  budesonide  80 MICROgram(s)/formoterol 4.5 MICROgram(s) Inhaler 2 Puff(s) Inhalation two times a day  cholecalciferol 2000 Unit(s) Oral daily  cyanocobalamin 1000 MICROGram(s) Oral daily  enoxaparin Injectable 40 milliGRAM(s) SubCutaneous every 24 hours  pantoprazole    Tablet 40 milliGRAM(s) Oral before breakfast  potassium phosphate / sodium phosphate Tablet (K-PHOS No. 2) 1 Tablet(s) Oral four times a day with meals  sodium zirconium cyclosilicate 5 Gram(s) Oral daily  tamsulosin 0.4 milliGRAM(s) Oral at bedtime  tiotropium 18 MICROgram(s) Capsule 1 Capsule(s) Inhalation daily    MEDICATIONS  (PRN):  ALBUTerol    90 MICROgram(s) HFA Inhaler 2 Puff(s) Inhalation every 6 hours PRN Shortness of Breath and/or Wheezing  oxyCODONE    IR 5 milliGRAM(s) Oral every 4 hours PRN Moderate Pain (4 - 6)  oxyCODONE    IR 10 milliGRAM(s) Oral every 4 hours PRN Severe Pain (7 - 10)      ROS  No fever, sweats, chills  No epistaxis, HA, sore throat  No CP, SOB, cough, sputum  No n/v/d, abd pain, melena, hematochezia  No edema  No rash  No anxiety  No back pain, joint pain  No bleeding, bruising  No dysuria, hematuria    Vital Signs Last 24 Hrs  T(C): 36.8 (17 Mar 2022 06:20), Max: 36.8 (17 Mar 2022 06:20)  T(F): 98.2 (17 Mar 2022 06:20), Max: 98.2 (17 Mar 2022 06:20)  HR: 93 (17 Mar 2022 06:20) (89 - 97)  BP: 115/67 (17 Mar 2022 06:20) (107/64 - 120/70)  BP(mean): --  RR: 18 (17 Mar 2022 06:20) (16 - 18)  SpO2: 98% (17 Mar 2022 06:20) (97% - 99%)    PE  NAD  Awake, alert  Anicteric, MMM  Abd soft, NT, ND  Colostomy in place  No c/c/e  No rash grossly                            9.6    5.17  )-----------( 277      ( 17 Mar 2022 07:19 )             31.4       03-17    142  |  106  |  11  ----------------------------<  86  4.1   |  28  |  1.14    Ca    8.4      17 Mar 2022 07:19  Phos  2.3     03-17  Mg     2.00     03-17

## 2022-03-17 NOTE — PROGRESS NOTE ADULT - SUBJECTIVE AND OBJECTIVE BOX
GENERAL SURGERY PROGRESS NOTE    SUBJECTIVE  Patient seen and examined. No acute events overnight.        OBJECTIVE    PHYSICAL EXAM  General: Appears well, NAD  CHEST: breathing comfortably  CV: appears well perfused  Abdomen: soft, nontender, nondistended, no rebound or guarding  Extremities: Grossly symmetric    T(C): 36.2 (03-16-22 @ 21:58), Max: 36.6 (03-16-22 @ 02:14)  HR: 90 (03-16-22 @ 21:58) (89 - 99)  BP: 116/66 (03-16-22 @ 21:58) (107/64 - 119/69)  RR: 16 (03-16-22 @ 21:58) (16 - 18)  SpO2: 98% (03-16-22 @ 21:58) (95% - 99%)    03-15-22 @ 07:01  -  03-16-22 @ 07:00  --------------------------------------------------------  IN: 2176.5 mL / OUT: 2965 mL / NET: -788.5 mL    03-16-22 @ 07:01  -  03-17-22 @ 00:30  --------------------------------------------------------  IN: 320 mL / OUT: 1575 mL / NET: -1255 mL        MEDICATIONS  acetaminophen     Tablet .. 975 milliGRAM(s) Oral every 6 hours  ALBUTerol    90 MICROgram(s) HFA Inhaler 2 Puff(s) Inhalation every 6 hours PRN  amLODIPine   Tablet 5 milliGRAM(s) Oral daily  budesonide  80 MICROgram(s)/formoterol 4.5 MICROgram(s) Inhaler 2 Puff(s) Inhalation two times a day  cholecalciferol 2000 Unit(s) Oral daily  cyanocobalamin 1000 MICROGram(s) Oral daily  dextrose 5% + sodium chloride 0.45%. 1000 milliLiter(s) IV Continuous <Continuous>  enoxaparin Injectable 40 milliGRAM(s) SubCutaneous every 24 hours  oxyCODONE    IR 5 milliGRAM(s) Oral every 4 hours PRN  oxyCODONE    IR 10 milliGRAM(s) Oral every 4 hours PRN  pantoprazole    Tablet 40 milliGRAM(s) Oral before breakfast  sodium zirconium cyclosilicate 5 Gram(s) Oral daily  tamsulosin 0.4 milliGRAM(s) Oral at bedtime  tiotropium 18 MICROgram(s) Capsule 1 Capsule(s) Inhalation daily      LABS                        10.1   5.44  )-----------( 266      ( 16 Mar 2022 07:56 )             31.7     03-16    144  |  107  |  14  ----------------------------<  88  4.6   |  29  |  1.11    Ca    8.4      16 Mar 2022 07:56  Phos  2.5     03-16  Mg     2.20     03-16            RADIOLOGY & ADDITIONAL STUDIES GENERAL SURGERY A TEAM DAILY PROGRESS NOTE    SUBJECTIVE  Patient seen and examined. No acute events overnight.  Patient lying comfortably in bed on morning rounds. Tolerating diet, voiding, ostomy with air and stool. Denies chest pain, SOB, palpitations, dizziness, N/V/D, fever and chills.      OBJECTIVE    PHYSICAL EXAM  General: Appears well, NAD  CHEST: breathing comfortably  CV: appears well perfused  Abdomen: soft, nontender, nondistended, ostomy with air and stool  Extremities: Grossly symmetric    T(C): 36.2 (03-16-22 @ 21:58), Max: 36.6 (03-16-22 @ 02:14)  HR: 90 (03-16-22 @ 21:58) (89 - 99)  BP: 116/66 (03-16-22 @ 21:58) (107/64 - 119/69)  RR: 16 (03-16-22 @ 21:58) (16 - 18)  SpO2: 98% (03-16-22 @ 21:58) (95% - 99%)    03-15-22 @ 07:01  -  03-16-22 @ 07:00  --------------------------------------------------------  IN: 2176.5 mL / OUT: 2965 mL / NET: -788.5 mL    03-16-22 @ 07:01  -  03-17-22 @ 00:30  --------------------------------------------------------  IN: 320 mL / OUT: 1575 mL / NET: -1255 mL        MEDICATIONS  acetaminophen     Tablet .. 975 milliGRAM(s) Oral every 6 hours  ALBUTerol    90 MICROgram(s) HFA Inhaler 2 Puff(s) Inhalation every 6 hours PRN  amLODIPine   Tablet 5 milliGRAM(s) Oral daily  budesonide  80 MICROgram(s)/formoterol 4.5 MICROgram(s) Inhaler 2 Puff(s) Inhalation two times a day  cholecalciferol 2000 Unit(s) Oral daily  cyanocobalamin 1000 MICROGram(s) Oral daily  dextrose 5% + sodium chloride 0.45%. 1000 milliLiter(s) IV Continuous <Continuous>  enoxaparin Injectable 40 milliGRAM(s) SubCutaneous every 24 hours  oxyCODONE    IR 5 milliGRAM(s) Oral every 4 hours PRN  oxyCODONE    IR 10 milliGRAM(s) Oral every 4 hours PRN  pantoprazole    Tablet 40 milliGRAM(s) Oral before breakfast  sodium zirconium cyclosilicate 5 Gram(s) Oral daily  tamsulosin 0.4 milliGRAM(s) Oral at bedtime  tiotropium 18 MICROgram(s) Capsule 1 Capsule(s) Inhalation daily      LABS                        10.1   5.44  )-----------( 266      ( 16 Mar 2022 07:56 )             31.7     03-16    144  |  107  |  14  ----------------------------<  88  4.6   |  29  |  1.11    Ca    8.4      16 Mar 2022 07:56  Phos  2.5     03-16  Mg     2.20     03-16            RADIOLOGY & ADDITIONAL STUDIES

## 2022-03-17 NOTE — PROGRESS NOTE ADULT - ASSESSMENT
Hematology/Oncology consulted on this patient known to [facility] and follows with Dr Willingham for muscle invasive bladder cancer last completed treatment with Carbo/Elora 12/21 who was previously admitted 1/15/22 with dyspnea and found to have a small to moderate left pneumothorax, he was discharged on 1/17 without surgical intervention and improvement in Pneumothorax. He was readmitted on 3/11 with abdominal pain and constipation, found to have a small bowel obstruction/Ileus.     Muscle invasive bladder cancer  -- Patient has completed 6 cycles of carboplatin + gemcitabine. He is not on active treatment at this time.  - pleural growth/lung mass biopsied outpatient.  - Pt will follow-up with Dr. Willingham at [facility] after discharge    SBO/Ileus  -CT Abdomen / Pelvis completed  Impression   showing enlarging pelvic mass inseparable from the bladder and prostate and closely abutting the sigmoid colon. There is diffusely dilated small and large bowel to the level of the sigmoid colon, where there is suggestion of soft tissue thickening and luminal narrowing.  Neoplastic obstruction of the bowel from adjacent pelvic tumor is not excluded.  Moderate bilateral hydronephrosis, nephroureteral stents in place.  Large calcified masses in the lung bases, unchanged.   -3/13  S/P transverse loop colostomy for LBO secondary to large compressing bladder mass   -Tolerating regular diet    Post op pain  --Managed on Oxycodone      GO  --Palliative care consulted, Patient remains DNR/DNI    Patient will follow with Dr Willingham at [facility] after discharge    Diane Rothman NP  Hematology/Oncology  [facility]  [phone] (Office)  [phone] (Alt office)  Evenings and weekends please call MD on call or office

## 2022-03-18 ENCOUNTER — TRANSCRIPTION ENCOUNTER (OUTPATIENT)
Age: 82
End: 2022-03-18

## 2022-03-18 VITALS
DIASTOLIC BLOOD PRESSURE: 75 MMHG | SYSTOLIC BLOOD PRESSURE: 117 MMHG | TEMPERATURE: 98 F | OXYGEN SATURATION: 100 % | HEART RATE: 101 BPM | RESPIRATION RATE: 17 BRPM

## 2022-03-18 LAB
ANION GAP SERPL CALC-SCNC: 13 MMOL/L — SIGNIFICANT CHANGE UP (ref 7–14)
BUN SERPL-MCNC: 11 MG/DL — SIGNIFICANT CHANGE UP (ref 7–23)
CALCIUM SERPL-MCNC: 8.3 MG/DL — LOW (ref 8.4–10.5)
CHLORIDE SERPL-SCNC: 105 MMOL/L — SIGNIFICANT CHANGE UP (ref 98–107)
CO2 SERPL-SCNC: 28 MMOL/L — SIGNIFICANT CHANGE UP (ref 22–31)
CREAT SERPL-MCNC: 1.2 MG/DL — SIGNIFICANT CHANGE UP (ref 0.5–1.3)
EGFR: 61 ML/MIN/1.73M2 — SIGNIFICANT CHANGE UP
GLUCOSE SERPL-MCNC: 73 MG/DL — SIGNIFICANT CHANGE UP (ref 70–99)
HCT VFR BLD CALC: 33 % — LOW (ref 39–50)
HGB BLD-MCNC: 10.5 G/DL — LOW (ref 13–17)
MAGNESIUM SERPL-MCNC: 1.9 MG/DL — SIGNIFICANT CHANGE UP (ref 1.6–2.6)
MCHC RBC-ENTMCNC: 28.5 PG — SIGNIFICANT CHANGE UP (ref 27–34)
MCHC RBC-ENTMCNC: 31.8 GM/DL — LOW (ref 32–36)
MCV RBC AUTO: 89.7 FL — SIGNIFICANT CHANGE UP (ref 80–100)
NRBC # BLD: 0 /100 WBCS — SIGNIFICANT CHANGE UP
NRBC # FLD: 0 K/UL — SIGNIFICANT CHANGE UP
PHOSPHATE SERPL-MCNC: 3.1 MG/DL — SIGNIFICANT CHANGE UP (ref 2.5–4.5)
PLATELET # BLD AUTO: 281 K/UL — SIGNIFICANT CHANGE UP (ref 150–400)
POTASSIUM SERPL-MCNC: 4 MMOL/L — SIGNIFICANT CHANGE UP (ref 3.5–5.3)
POTASSIUM SERPL-SCNC: 4 MMOL/L — SIGNIFICANT CHANGE UP (ref 3.5–5.3)
RBC # BLD: 3.68 M/UL — LOW (ref 4.2–5.8)
RBC # FLD: 18.1 % — HIGH (ref 10.3–14.5)
SODIUM SERPL-SCNC: 146 MMOL/L — HIGH (ref 135–145)
WBC # BLD: 6.35 K/UL — SIGNIFICANT CHANGE UP (ref 3.8–10.5)
WBC # FLD AUTO: 6.35 K/UL — SIGNIFICANT CHANGE UP (ref 3.8–10.5)

## 2022-03-18 RX ADMIN — PANTOPRAZOLE SODIUM 40 MILLIGRAM(S): 20 TABLET, DELAYED RELEASE ORAL at 06:19

## 2022-03-18 RX ADMIN — AMLODIPINE BESYLATE 5 MILLIGRAM(S): 2.5 TABLET ORAL at 11:32

## 2022-03-18 RX ADMIN — Medication 2000 UNIT(S): at 11:32

## 2022-03-18 RX ADMIN — BUDESONIDE AND FORMOTEROL FUMARATE DIHYDRATE 2 PUFF(S): 160; 4.5 AEROSOL RESPIRATORY (INHALATION) at 10:29

## 2022-03-18 RX ADMIN — TIOTROPIUM BROMIDE 1 CAPSULE(S): 18 CAPSULE ORAL; RESPIRATORY (INHALATION) at 10:31

## 2022-03-18 RX ADMIN — ENOXAPARIN SODIUM 40 MILLIGRAM(S): 100 INJECTION SUBCUTANEOUS at 11:32

## 2022-03-18 NOTE — DISCHARGE NOTE PROVIDER - NSDCHHPTASSISTHOME_GEN_ALL_CORE
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  The patient has not taking any medication with the symptoms  The patient states there is family history of arthritis but not exactly sure what type  She denies any other complaints  The following portions of the patient's history were reviewed and updated as appropriate: allergies, current medications, past family history, past medical history, past social history, past surgical history and problem list     Review of Systems   HENT: Negative  Eyes: Negative  Respiratory: Negative  Cardiovascular: Negative  Gastrointestinal: Negative  Endocrine: Negative  Musculoskeletal: Negative  Hand stiffness   Allergic/Immunologic: Negative  Neurological: Negative  Hematological: Negative  Psychiatric/Behavioral: Negative  All other systems reviewed and are negative  Objective:      /64 (BP Location: Left arm, Patient Position: Sitting, Cuff Size: Standard)   Pulse 79   Temp 98 °F (36 7 °C) (Tympanic)   Resp 18   Ht 5' 7" (1 702 m)   Wt 60 7 kg (133 lb 12 8 oz)   SpO2 98%   BMI 20 96 kg/m²          Physical Exam  Constitutional:       Appearance: Normal appearance  Cardiovascular:      Rate and Rhythm: Regular rhythm  Pulmonary:      Effort: Pulmonary effort is normal       Breath sounds: Normal breath sounds  Musculoskeletal:      Comments: No swelling or joint deformities in any of her digits of either hand   Neurological:      Mental Status: She is alert     Psychiatric:         Mood and Affect: Mood normal          Behavior: Behavior normal          Thought Content:  Thought content normal          Judgment: Judgment normal  Patient Needs Assistance to Leave Residence...

## 2022-03-18 NOTE — DISCHARGE NOTE PROVIDER - CARE PROVIDER_API CALL
Juventino Woods)  ColonRectal Surgery; Surgery  [address], [address]  [place] [address]  Phone: [phone]  Fax: [phone]  Follow Up Time: 1 week    Yesenia Adams)  Gastroenterology; Internal Medicine  [address] [place] [address]  Phone: [phone]  Fax: [phone]  Follow Up Time: 2 weeks    Fabricio Willingham)  Internal Medicine  [id number]  [place] [address]  Phone: [phone]  Fax: [phone]  Follow Up Time: 1 week

## 2022-03-18 NOTE — ADVANCED PRACTICE NURSE CONSULT - RECOMMEDATIONS
Pending discharge to home. Supplies provided for two weeks.   Please contact Wound Care Service Line if we can be of further assistance (ext [phone]).

## 2022-03-18 NOTE — PROGRESS NOTE ADULT - PROVIDER SPECIALTY LIST ADULT
Gastroenterology
Heme/Onc
SICU
Surgery
Anesthesia
Heme/Onc
SICU
Surgery
Gastroenterology
Surgery
Surgery
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
## 2022-03-18 NOTE — DISCHARGE NOTE NURSING/CASE MANAGEMENT/SOCIAL WORK - PATIENT PORTAL LINK FT
You can access the FollowMyHealth Patient Portal offered by [facility] by registering at the following website: [email or web address]. By joining [facility]’s FollowMyHealth portal, you will also be able to view your health information using other applications (apps) compatible with our system.

## 2022-03-18 NOTE — CONSULT NOTE ADULT - CONSULT REQUESTED DATE/TIME
17-Mar-2022
12-Mar-2022 10:04
13-Mar-2022 15:55
14-Mar-2022 14:54
12-Mar-2022 04:09
16-Mar-2022 08:55

## 2022-03-18 NOTE — PROGRESS NOTE ADULT - SUBJECTIVE AND OBJECTIVE BOX
Patient is a 81y old  Male who presents with a chief complaint of Abd Pain/Distension (18 Mar 2022 07:05)      MEDICATIONS  (STANDING):  amLODIPine   Tablet 5 milliGRAM(s) Oral daily  budesonide  80 MICROgram(s)/formoterol 4.5 MICROgram(s) Inhaler 2 Puff(s) Inhalation two times a day  cholecalciferol 2000 Unit(s) Oral daily  cyanocobalamin 1000 MICROGram(s) Oral daily  enoxaparin Injectable 40 milliGRAM(s) SubCutaneous every 24 hours  pantoprazole    Tablet 40 milliGRAM(s) Oral before breakfast  tamsulosin 0.4 milliGRAM(s) Oral at bedtime  tiotropium 18 MICROgram(s) Capsule 1 Capsule(s) Inhalation daily    MEDICATIONS  (PRN):  ALBUTerol    90 MICROgram(s) HFA Inhaler 2 Puff(s) Inhalation every 6 hours PRN Shortness of Breath and/or Wheezing  oxyCODONE    IR 5 milliGRAM(s) Oral every 4 hours PRN Moderate Pain (4 - 6)  oxyCODONE    IR 10 milliGRAM(s) Oral every 4 hours PRN Severe Pain (7 - 10)      ROS  No fever, sweats, chills  No epistaxis, HA, sore throat  No CP, SOB, cough, sputum  No n/v/d, abd pain, melena, hematochezia  No edema  No rash  No anxiety  No back pain, joint pain  No bleeding, bruising  No dysuria, hematuria    Vital Signs Last 24 Hrs  T(C): 36.8 (18 Mar 2022 05:00), Max: 36.8 (17 Mar 2022 20:00)  T(F): 98.2 (18 Mar 2022 05:00), Max: 98.3 (17 Mar 2022 22:19)  HR: 97 (18 Mar 2022 05:00) (95 - 97)  BP: 128/79 (18 Mar 2022 05:00) (112/64 - 128/79)  BP(mean): --  RR: 16 (18 Mar 2022 05:00) (16 - 18)  SpO2: 96% (18 Mar 2022 05:00) (96% - 98%)    PE  NAD  Awake, alert  Anicteric, MMM  Ostomy in place  No c/c/e  No rash grossly                          10.5   6.35  )-----------( 281      ( 18 Mar 2022 06:45 )             33.0       03-18    146<H>  |  105  |  11  ----------------------------<  73  4.0   |  28  |  1.20    Ca    8.3<L>      18 Mar 2022 06:45  Phos  3.1     03-18  Mg     1.90     03-18

## 2022-03-18 NOTE — ADVANCED PRACTICE NURSE CONSULT - REASON FOR CONSULT
Initial visit for post op teaching of newly created loop transverse colostomy. Patient received sitting in the chair. Willing to participate in teaching session. Terminology reviewed ostomy/colostomy. Patient able to verbalized understanding. Proceeded it change pouch, patient able to demonstrate pouch opening and draining independently. Patient able to participate in aspects of pouch change like measuring stroma and cutting new pouch. Patient asking appropriate questions. Spoke to wife Albert, explained briefly about basics of pouch. Discussed VNS will come home to reinforce teaching.  Initial visit for post op teaching of newly created loop transverse colostomy. Patient received sitting in the chair. Willing to participate in teaching session. Terminology reviewed ostomy/colostomy. Patient able to verbalized understanding. Proceeded it change pouch, patient able to demonstrate pouch opening and draining independently. Patient able to participate in aspects of pouch change like measuring stroma and cutting new pouch. Patient asking appropriate questions. Spoke to wife Albert, explained briefly about basics of pouch. Discussed VNS will come home to reinforce teaching.   Of note, see Wound care attending for sacral wound.

## 2022-03-18 NOTE — DISCHARGE NOTE PROVIDER - NSDCFUADDINST_GEN_ALL_CORE_FT
Ostomy wound care:  Stoma location/ size: LUQ, 1 3/4"  Appliance used: Two piece drainable pouch system       Flat waffer (2 3/4")- item # [id number]      Drainable pouch (2 3/4")- item # [id number]

## 2022-03-18 NOTE — DISCHARGE NOTE PROVIDER - HOSPITAL COURSE
82 y/o male, with a PmHx of COPD (on home O2 @ 3L NC), Pneumothorax (1/22), Vit D deficiency, HTN, HLD, Bladder Ca unresectable s/p chemo (6 cycles of carboplatin + gemcitabine - completed 12/2021) and recently started on immunotherapy x 1 dose on 2/28/22, Cholecystectomy, who presents to the [facility] ED with abd pain over the past month but had gotten worse over the past 2 days. Pt was recently treated at [facility] for a pneumothorax (1/15/22) and a UTI by PCP on 3/3/22 with Linezolid. Patient represented to [facility] ED 3/12 with worsening abdominal pain. Pt states he has been having worsening abd pain over the past month (lower abdominal area) but over the last 2 days states his stomach became distended and hasn't been able to have a BM. States last BM was 3 hrs prior to coming to the ED but only had minimal amount of stool that came out. pt states he had tried taking senna, miralax, lactulose without relief. Pt states he is also having trouble urinating in which he doesn't always feel if his bladder is full and has trouble urinating when does void.  3/12 CT abd/Pelvis done that showed a partial LBO 2/2 extraluminal compression of the sigmoid by the bladder cancer. He was seen by colorectal surgery and no surgical intervention was recommended at this time. He appears comfortable at this time and is now being admitted to medicine for further medical management and treatment of the partial sbo and mild-moderate hydronephrosis.  GI consulted for abdominal distension, NGT and rectal tube placed for decompression planned for possible endoscopy. Oveernight patient desatted to 80s and required 15L NC CXR demonstrating pleural effusions and possible compression atelectasis from worsening abdominal distension. SICU consulted and patient transferred to SICU for hemodynamic and airway monitoring. Flex sig performed by GI 3/13  stool seen in rectum, complete obstruction at 30cm. Attempts to switch for gastroscope, advance guidewire unsuccessful.  Patient was brought to OR by Dr. Horton where he underwent loop, transverse colostomy, tolerated procedure well without complication.  Transferred back to SICU post-operatively. Patient was treated for hyperkalemia with lokelma and insulin shifting. O2 weaned.  Palliative care met with patient and wife to discuss goals of care patient was made DNR/DNI but patient is still seeking immunotherapy treatments upon discharge from [facility] for his bladder cancer. Next session planned for 3/21  3/15 Patient downgraded from ICU to surgical floor. Ostomy teaching provided by nurse to patient and wife. Diet advanced as tolerated to regular.     Currently patient tolerating regular diet, ambulating at baseline, ostomy with good function, voiding, and pain is well controlled on oral pain medications.  Per team and attending patient is stable for discharge to home with home PT and follow up with Dr. Woods in one week.

## 2022-03-18 NOTE — DISCHARGE NOTE PROVIDER - NSDCHHBASESERVICE_GEN_ALL_CORE
2017    Call Regarding Onboarding [employer]    Attempt 1    Message on voicemail     Comments:   Spouse: AMARIS BNEÍTEZ :1995 MRN:[id number] Mbr ID:[id number]   Dependent 1: NEHAALEJANRDO TERA :2007 MRN:[id number] Mbr ID:[id number]  Dependent 2: ELOISEKATIJADE BERMUDEZCH :2005 MRN:[id number] Mbr ID:[id number]   Dependent 3: SHERRI HALEY :3/29/2004 MRN:[id number] Mbr ID:[id number]   Dependent 4: LARRY TERA :2002 MRN:[id number] Mbr ID:[id number]   Dependent 5: MAGALIE BENÍTEZ :1997 MRN:[id number] Mbr ID:[id number]      Outreach   Haleigh Martin             Nursing/Physical therapy

## 2022-03-18 NOTE — DISCHARGE NOTE NURSING/CASE MANAGEMENT/SOCIAL WORK - NSSCNAMETXT_GEN_ALL_CORE
You were referred to [facility] for home care services. The visiting RN will call to schedule a time to visit for the day after discharge.

## 2022-03-18 NOTE — DISCHARGE NOTE NURSING/CASE MANAGEMENT/SOCIAL WORK - NSDCPEFALRISK_GEN_ALL_CORE
For information on Fall & Injury Prevention, visit: [email or web address] OR  [email or web address] OR  https://www.cdc.gov/steadi/patient.html

## 2022-03-18 NOTE — PROGRESS NOTE ADULT - SUBJECTIVE AND OBJECTIVE BOX
GENERAL SURGERY PROGRESS NOTE    SUBJECTIVE  Patient seen and examined. No acute events overnight.      OBJECTIVE    PHYSICAL EXAM  General: Appears well, NAD  CHEST: breathing comfortably  CV: appears well perfused  Abdomen: soft, nontender, nondistended, no rebound or guarding  Extremities: Grossly symmetric    T(C): 36.8 (03-17-22 @ 22:19), Max: 36.8 (03-17-22 @ 06:20)  HR: 96 (03-17-22 @ 22:19) (93 - 97)  BP: 119/70 (03-17-22 @ 22:19) (112/64 - 123/75)  RR: 18 (03-17-22 @ 22:19) (18 - 18)  SpO2: 98% (03-17-22 @ 22:19) (97% - 98%)    03-16-22 @ 07:01  -  03-17-22 @ 07:00  --------------------------------------------------------  IN: 960 mL / OUT: 2125 mL / NET: -1165 mL    03-17-22 @ 07:01  -  03-18-22 @ 00:40  --------------------------------------------------------  IN: 0 mL / OUT: 1050 mL / NET: -1050 mL        MEDICATIONS  acetaminophen     Tablet .. 975 milliGRAM(s) Oral every 6 hours  ALBUTerol    90 MICROgram(s) HFA Inhaler 2 Puff(s) Inhalation every 6 hours PRN  amLODIPine   Tablet 5 milliGRAM(s) Oral daily  budesonide  80 MICROgram(s)/formoterol 4.5 MICROgram(s) Inhaler 2 Puff(s) Inhalation two times a day  cholecalciferol 2000 Unit(s) Oral daily  cyanocobalamin 1000 MICROGram(s) Oral daily  enoxaparin Injectable 40 milliGRAM(s) SubCutaneous every 24 hours  oxyCODONE    IR 5 milliGRAM(s) Oral every 4 hours PRN  oxyCODONE    IR 10 milliGRAM(s) Oral every 4 hours PRN  pantoprazole    Tablet 40 milliGRAM(s) Oral before breakfast  tamsulosin 0.4 milliGRAM(s) Oral at bedtime  tiotropium 18 MICROgram(s) Capsule 1 Capsule(s) Inhalation daily      LABS                        9.6    5.17  )-----------( 277      ( 17 Mar 2022 07:19 )             31.4     03-17    142  |  106  |  11  ----------------------------<  86  4.1   |  28  |  1.14    Ca    8.4      17 Mar 2022 07:19  Phos  2.3     03-17  Mg     2.00     03-17            RADIOLOGY & ADDITIONAL STUDIES GENERAL SURGERY PROGRESS NOTE    SUBJECTIVE  Patient seen and examined. No acute events overnight.  Patient lying comfortably in bed this morning. Tolerating regular diet, pain well controlled. Denies chest pain, palpitations, SOB, dizziness, N/V/D, fever and chills.    OBJECTIVE    PHYSICAL EXAM  General: Appears well, NAD  CHEST: breathing comfortably  CV: appears well perfused  Abdomen: soft, nontender, midline incision C/D/I, colostomy with red rubber air and stool  Extremities: Grossly symmetric    T(C): 36.8 (03-17-22 @ 22:19), Max: 36.8 (03-17-22 @ 06:20)  HR: 96 (03-17-22 @ 22:19) (93 - 97)  BP: 119/70 (03-17-22 @ 22:19) (112/64 - 123/75)  RR: 18 (03-17-22 @ 22:19) (18 - 18)  SpO2: 98% (03-17-22 @ 22:19) (97% - 98%)    03-16-22 @ 07:01  -  03-17-22 @ 07:00  --------------------------------------------------------  IN: 960 mL / OUT: 2125 mL / NET: -1165 mL    03-17-22 @ 07:01  -  03-18-22 @ 00:40  --------------------------------------------------------  IN: 0 mL / OUT: 1050 mL / NET: -1050 mL        MEDICATIONS  acetaminophen     Tablet .. 975 milliGRAM(s) Oral every 6 hours  ALBUTerol    90 MICROgram(s) HFA Inhaler 2 Puff(s) Inhalation every 6 hours PRN  amLODIPine   Tablet 5 milliGRAM(s) Oral daily  budesonide  80 MICROgram(s)/formoterol 4.5 MICROgram(s) Inhaler 2 Puff(s) Inhalation two times a day  cholecalciferol 2000 Unit(s) Oral daily  cyanocobalamin 1000 MICROGram(s) Oral daily  enoxaparin Injectable 40 milliGRAM(s) SubCutaneous every 24 hours  oxyCODONE    IR 5 milliGRAM(s) Oral every 4 hours PRN  oxyCODONE    IR 10 milliGRAM(s) Oral every 4 hours PRN  pantoprazole    Tablet 40 milliGRAM(s) Oral before breakfast  tamsulosin 0.4 milliGRAM(s) Oral at bedtime  tiotropium 18 MICROgram(s) Capsule 1 Capsule(s) Inhalation daily      LABS                        9.6    5.17  )-----------( 277      ( 17 Mar 2022 07:19 )             31.4     03-17    142  |  106  |  11  ----------------------------<  86  4.1   |  28  |  1.14    Ca    8.4      17 Mar 2022 07:19  Phos  2.3     03-17  Mg     2.00     03-17            RADIOLOGY & ADDITIONAL STUDIES

## 2022-03-18 NOTE — PROGRESS NOTE ADULT - ASSESSMENT
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  Moderate bilateral hydronephrosis, nephroureteral stents in place.  Large calcified masses in the lung bases, unchanged.  Now S/P transverse loop colostomy for LBO secondary to large compressing bladder mass on 3/13/22.      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  Moderate bilateral hydronephrosis, nephroureteral stents in place.  Large calcified masses in the lung bases, unchanged.  Now S/P transverse loop colostomy for LBO secondary to large compressing bladder mass on 3/13/22.      PLAN:  - regular diet  - Monitor ostomy output  -ostomy teaching  - Palliative consult appreciated  - Out of bed  - Pain control  - VTE prophylaxis with Lovenox subcutaneous  -plan to dc home later today after ostomy teaching        #[id number]  [facility]

## 2022-03-18 NOTE — DISCHARGE NOTE NURSING/CASE MANAGEMENT/SOCIAL WORK - NSDCPETBCESMAN_GEN_ALL_CORE
If you are a smoker, it is important for your health to stop smoking. Please be aware that second hand smoke is also harmful. Never smoker

## 2022-03-18 NOTE — PROGRESS NOTE ADULT - ASSESSMENT
Hematology/Oncology consulted on this patient known to [facility] and follows with Dr Willingham for muscle invasive bladder cancer last completed treatment with [facility] 12/21 who was previously admitted 1/15/22 with dyspnea and found to have a small to moderate left pneumothorax, he was discharged on 1/17 without surgical intervention and improvement in Pneumothorax. He was readmitted on 3/11 with abdominal pain and constipation, found to have a small bowel obstruction/Ileus.     Muscle invasive bladder cancer  -- Patient has completed 6 cycles of carboplatin + gemcitabine. He is not on active treatment at this time.  - pleural growth/lung mass biopsied outpatient.  - Pt will follow-up with Dr. Willingham at [facility] after discharge    SBO/Ileus  -CT Abdomen / Pelvis completed  Impression   showing enlarging pelvic mass inseparable from the bladder and prostate and closely abutting the sigmoid colon. There is diffusely dilated small and large bowel to the level of the sigmoid colon, where there is suggestion of soft tissue thickening and luminal narrowing.  Neoplastic obstruction of the bowel from adjacent pelvic tumor is not excluded.  Moderate bilateral hydronephrosis, nephroureteral stents in place.  Large calcified masses in the lung bases, unchanged.   -3/13  S/P transverse loop colostomy for LBO secondary to large compressing bladder mass   -Tolerating regular diet    Post op pain  --Managed on Oxycodone    [facility]  --Palliative care consulted, Patient remains DNR/DNI  --Discharge planning in progress    Patient will follow with Dr Willingham at [facility] after discharge    Diane Rothman NP  Hematology/Oncology  [facility]  [phone] (Office)  [phone] (Alt office)  Evenings and weekends please call MD on call or office

## 2022-03-18 NOTE — ADVANCED PRACTICE NURSE CONSULT - ASSESSMENT
See ostomy full assessment in flowsheets.   Stoma location/ size: LUQ, 1 3/4"  Appliance used: Two piece drainable pouch system       Flat waffer (2 3/4")- item # [id number]      Drainable pouch (2 3/4")- item # [id number]    Case discussed with

## 2022-03-18 NOTE — DISCHARGE NOTE PROVIDER - PROVIDER TOKENS
PROVIDER:[TOKEN:[[id number]],FOLLOWUP:[1 week]],PROVIDER:[TOKEN:[[id number]],FOLLOWUP:[2 weeks]],PROVIDER:[TOKEN:[[id number]],FOLLOWUP:[1 week]]

## 2022-03-18 NOTE — DISCHARGE NOTE PROVIDER - NSDCMRMEDTOKEN_GEN_ALL_CORE_FT
Advair Diskus 100 mcg-50 mcg inhalation powder: 1 puff(s) inhaled 2 times a day  amLODIPine 5 mg oral tablet: 1 tab(s) orally once a day  Incruse Ellipta 62.5 mcg/inh inhalation powder: 1 puff(s) inhaled every 24 hours  ipratropium-albuterol 0.5 mg-2.5 mg/3 mL inhalation solution: 3 milliliter(s) inhaled every 6 hours, As Needed - for shortness of breath and/or wheezing   oxycodone-acetaminophen 5 mg-325 mg oral tablet: 1 tab(s) orally every 6 hours, As Needed  Protonix 40 mg oral delayed release tablet: 1 tab(s) orally once a day  tamsulosin 0.4 mg oral capsule: 1 cap(s) orally once a day  Vitamin B-12 1000 mcg oral tablet: 1 tab(s) orally once a day  Vitamin D3 50 mcg (2000 intl units) oral tablet: 1 tab(s) orally once a day  Welchol 625 mg oral tablet: 1 tab(s) orally once a day

## 2022-03-18 NOTE — DISCHARGE NOTE PROVIDER - NSDCCPCAREPLAN_GEN_ALL_CORE_FT
PRINCIPAL DISCHARGE DIAGNOSIS  Diagnosis: Abdominal distention  Assessment and Plan of Treatment: WOUND CARE:  Keep incision clean dry and intact. Do not rub or scrub wound. Ostomy care as taught prior to discharge by nurse.  BATHING: Please do not submerge wound underwater. You may shower and/or sponge bathe.  ACTIVITY: No heavy lifting or straining. Otherwise, you may return to your usual level of physical activity. If you are taking narcotic pain medication (such as Percocet) DO NOT drive a car, operate machinery or make important decisions.  DIET: Return to your usual diet.  NOTIFY YOUR SURGEON IF: You have any bleeding that does not stop, any pus draining from your wound(s), any fever (over 100.4 F) or chills, persistent nausea/vomiting, persistent diarrhea, or if your pain is not controlled on your discharge pain medications.  FOLLOW-UP: Please follow up with your primary care physician in one week regarding your hospitalization

## 2022-03-21 PROBLEM — R91.1 SOLITARY PULMONARY NODULE: Chronic | Status: ACTIVE | Noted: 2019-11-21

## 2022-03-21 PROBLEM — Z87.09 PERSONAL HISTORY OF OTHER DISEASES OF THE RESPIRATORY SYSTEM: Chronic | Status: ACTIVE | Noted: 2022-03-12

## 2022-03-21 PROBLEM — E78.5 HYPERLIPIDEMIA, UNSPECIFIED: Chronic | Status: ACTIVE | Noted: 2022-03-12

## 2022-03-21 PROBLEM — I10 ESSENTIAL (PRIMARY) HYPERTENSION: Chronic | Status: ACTIVE | Noted: 2019-11-21

## 2022-03-21 PROBLEM — J43.9 EMPHYSEMA, UNSPECIFIED: Chronic | Status: ACTIVE | Noted: 2019-11-21

## 2022-03-22 ENCOUNTER — NON-APPOINTMENT (OUTPATIENT)
Age: 82
End: 2022-03-22

## 2022-03-23 ENCOUNTER — APPOINTMENT (OUTPATIENT)
Dept: COLORECTAL SURGERY | Facility: CLINIC | Age: 82
End: 2022-03-23
Payer: MEDICARE

## 2022-03-23 VITALS — WEIGHT: 146 LBS | HEIGHT: 72 IN | BODY MASS INDEX: 19.77 KG/M2

## 2022-03-23 VITALS — HEART RATE: 100 BPM | DIASTOLIC BLOOD PRESSURE: 67 MMHG | SYSTOLIC BLOOD PRESSURE: 121 MMHG | OXYGEN SATURATION: 81 %

## 2022-03-23 PROCEDURE — 99024 POSTOP FOLLOW-UP VISIT: CPT

## 2022-03-23 NOTE — ASSESSMENT
[FreeTextEntry1] : 81-year-old male status post diverting transverse loop colostomy for extrinsic compression/Invasion of the sigmoid colon causing Large bowel obstruction.

## 2022-04-04 ENCOUNTER — APPOINTMENT (OUTPATIENT)
Dept: COLORECTAL SURGERY | Facility: CLINIC | Age: 82
End: 2022-04-04
Payer: MEDICARE

## 2022-04-04 PROCEDURE — 99024 POSTOP FOLLOW-UP VISIT: CPT

## 2022-04-04 NOTE — ASSESSMENT
[FreeTextEntry1] : 81-year-old male with transverse loop colostomy now status post reduction of prolapsed stoma. I educated the patient about how to reduce the stoma prolapses at home. He also educated the patient developed signs of stomal necrosis that would prompt emergency room visit for urgent office visit.

## 2022-04-07 ENCOUNTER — APPOINTMENT (OUTPATIENT)
Dept: WOUND CARE | Facility: CLINIC | Age: 82
End: 2022-04-07
Payer: MEDICARE

## 2022-04-07 VITALS — HEIGHT: 72 IN | BODY MASS INDEX: 19.77 KG/M2 | TEMPERATURE: 98.1 F | WEIGHT: 146 LBS

## 2022-04-07 PROCEDURE — 11042 DBRDMT SUBQ TIS 1ST 20SQCM/<: CPT

## 2022-04-07 PROCEDURE — 99212 OFFICE O/P EST SF 10 MIN: CPT | Mod: 25

## 2022-04-07 NOTE — PHYSICAL EXAM
[de-identified] : Very thin , NAD [de-identified] : ot labored [de-identified] : loop colstomy [de-identified] : limited ambulation , in WC [de-identified] : atrophic [de-identified] : conversant [Please See PDF for [employer]] : Please See PDF for [employer].

## 2022-04-07 NOTE — PHYSICAL EXAM
[de-identified] : Very thin , NAD [de-identified] : ot labored [de-identified] : loop colstomy [de-identified] : limited ambulation , in WC [de-identified] : atrophic [de-identified] : conversant [Please See PDF for [employer]] : Please See PDF for [employer].

## 2022-04-07 NOTE — ASSESSMENT
[FreeTextEntry1] : Wound Assessment \par The patient presents with a wound to the sacrum.\par \par No clinical sign of infection\par Recommendation:\par \par Wound bed noted to be filled with fibrinous tissue/ subcutaneous tissue. Wound was cleaned with dakins. then debrided with a sharp instrument (curette) and dressed with medihoney and foam   .\par \par

## 2022-04-07 NOTE — HISTORY OF PRESENT ILLNESS
[FreeTextEntry1] : Mr. SHERYL DELONG presents to the office with a wound for at least 6 weeks duration. The wound is located on the sacral. The patient has complaints of pain and discomfortThe patient has been dressing the wound with ------. The patient denies fevers or chills. The patient has localized pain to the wound upon dressing changes. The patient has no other complaints or associated symptoms. \par \par

## 2022-04-07 NOTE — REASON FOR VISIT
[Consultation] : a consultation visit [Follow-Up: _____] : a [unfilled] follow-up visit [Spouse] : spouse [FreeTextEntry1] : Sacral Wound

## 2022-04-19 ENCOUNTER — APPOINTMENT (OUTPATIENT)
Dept: WOUND CARE | Facility: CLINIC | Age: 82
End: 2022-04-19
Payer: MEDICARE

## 2022-04-19 VITALS — BODY MASS INDEX: 19.77 KG/M2 | TEMPERATURE: 98 F | WEIGHT: 146 LBS | HEIGHT: 72 IN

## 2022-04-19 DIAGNOSIS — Z93.3 COLOSTOMY STATUS: ICD-10-CM

## 2022-04-19 DIAGNOSIS — R91.8 OTHER NONSPECIFIC ABNORMAL FINDING OF LUNG FIELD: ICD-10-CM

## 2022-04-19 DIAGNOSIS — F17.200 NICOTINE DEPENDENCE, UNSPECIFIED, UNCOMPLICATED: ICD-10-CM

## 2022-04-19 PROCEDURE — 11042 DBRDMT SUBQ TIS 1ST 20SQCM/<: CPT

## 2022-04-19 PROCEDURE — 99212 OFFICE O/P EST SF 10 MIN: CPT | Mod: 25

## 2022-04-19 NOTE — HISTORY OF PRESENT ILLNESS
[FreeTextEntry1] : has  loop colostomy with recurrent prolapse 2/2 Bladder neoplasm\par Was evaluated for a sacral decubitus\par detrimental effects of smoking on sacral decubitus discussed\par uses portable O2\par PCP is Michelet Chu

## 2022-04-19 NOTE — ASSESSMENT
[FreeTextEntry1] : sacral decubitus debrided using scalpel \par Continue use of Medihoney and offloading\par needs to gain weight\par \par straight depth on sacral wound  0.5 cm\par undermining at 12 o clock 1 cm\par continue using medihoney \par follow up in two weeks\par Tolerated curette debridement of wound well

## 2022-04-19 NOTE — PHYSICAL EXAM
[Please See PDF for [employer]] : Please See PDF for [employer]. [de-identified] : Very thin , NAD [de-identified] : not labored, uses portable O2 [de-identified] : loop colostomy [de-identified] : limited ambulation , in WC [de-identified] : atrophic [de-identified] : conversant

## 2022-04-20 ENCOUNTER — INPATIENT (INPATIENT)
Facility: HOSPITAL | Age: 82
LOS: 5 days | Discharge: HOME HEALTH | End: 2022-04-26
Attending: INTERNAL MEDICINE | Admitting: INTERNAL MEDICINE
Payer: MEDICARE

## 2022-04-20 VITALS
TEMPERATURE: 97 F | HEIGHT: 72 IN | HEART RATE: 88 BPM | RESPIRATION RATE: 16 BRPM | SYSTOLIC BLOOD PRESSURE: 124 MMHG | DIASTOLIC BLOOD PRESSURE: 59 MMHG | OXYGEN SATURATION: 97 %

## 2022-04-20 DIAGNOSIS — L89.159 PRESSURE ULCER OF SACRAL REGION, UNSPECIFIED STAGE: ICD-10-CM

## 2022-04-20 DIAGNOSIS — J93.9 PNEUMOTHORAX, UNSPECIFIED: ICD-10-CM

## 2022-04-20 DIAGNOSIS — Z98.890 OTHER SPECIFIED POSTPROCEDURAL STATES: Chronic | ICD-10-CM

## 2022-04-20 DIAGNOSIS — Z90.49 ACQUIRED ABSENCE OF OTHER SPECIFIED PARTS OF DIGESTIVE TRACT: Chronic | ICD-10-CM

## 2022-04-20 DIAGNOSIS — I82.629 ACUTE EMBOLISM AND THROMBOSIS OF DEEP VEINS OF UNSPECIFIED UPPER EXTREMITY: ICD-10-CM

## 2022-04-20 DIAGNOSIS — Z98.49 CATARACT EXTRACTION STATUS, UNSPECIFIED EYE: Chronic | ICD-10-CM

## 2022-04-20 DIAGNOSIS — J44.9 CHRONIC OBSTRUCTIVE PULMONARY DISEASE, UNSPECIFIED: ICD-10-CM

## 2022-04-20 DIAGNOSIS — N17.9 ACUTE KIDNEY FAILURE, UNSPECIFIED: ICD-10-CM

## 2022-04-20 DIAGNOSIS — Z29.9 ENCOUNTER FOR PROPHYLACTIC MEASURES, UNSPECIFIED: ICD-10-CM

## 2022-04-20 DIAGNOSIS — C67.9 MALIGNANT NEOPLASM OF BLADDER, UNSPECIFIED: ICD-10-CM

## 2022-04-20 DIAGNOSIS — Z87.828 PERSONAL HISTORY OF OTHER (HEALED) PHYSICAL INJURY AND TRAUMA: Chronic | ICD-10-CM

## 2022-04-20 LAB
ALBUMIN SERPL ELPH-MCNC: 3.2 G/DL — LOW (ref 3.3–5)
ALP SERPL-CCNC: 71 U/L — SIGNIFICANT CHANGE UP (ref 40–120)
ALT FLD-CCNC: 10 U/L — SIGNIFICANT CHANGE UP (ref 4–41)
ANION GAP SERPL CALC-SCNC: 15 MMOL/L — HIGH (ref 7–14)
APTT BLD: 32.3 SEC — SIGNIFICANT CHANGE UP (ref 27–36.3)
AST SERPL-CCNC: 22 U/L — SIGNIFICANT CHANGE UP (ref 4–40)
BASOPHILS # BLD AUTO: 0.02 K/UL — SIGNIFICANT CHANGE UP (ref 0–0.2)
BASOPHILS NFR BLD AUTO: 0.2 % — SIGNIFICANT CHANGE UP (ref 0–2)
BILIRUB SERPL-MCNC: 0.4 MG/DL — SIGNIFICANT CHANGE UP (ref 0.2–1.2)
BUN SERPL-MCNC: 30 MG/DL — HIGH (ref 7–23)
CALCIUM SERPL-MCNC: 8.9 MG/DL — SIGNIFICANT CHANGE UP (ref 8.4–10.5)
CHLORIDE SERPL-SCNC: 106 MMOL/L — SIGNIFICANT CHANGE UP (ref 98–107)
CO2 SERPL-SCNC: 22 MMOL/L — SIGNIFICANT CHANGE UP (ref 22–31)
CREAT SERPL-MCNC: 2.57 MG/DL — HIGH (ref 0.5–1.3)
EGFR: 25 ML/MIN/1.73M2 — LOW
EOSINOPHIL # BLD AUTO: 0.04 K/UL — SIGNIFICANT CHANGE UP (ref 0–0.5)
EOSINOPHIL NFR BLD AUTO: 0.4 % — SIGNIFICANT CHANGE UP (ref 0–6)
FLUAV AG NPH QL: SIGNIFICANT CHANGE UP
FLUBV AG NPH QL: SIGNIFICANT CHANGE UP
GLUCOSE SERPL-MCNC: 84 MG/DL — SIGNIFICANT CHANGE UP (ref 70–99)
HCT VFR BLD CALC: 31.6 % — LOW (ref 39–50)
HGB BLD-MCNC: 9.9 G/DL — LOW (ref 13–17)
IANC: 6.73 K/UL — SIGNIFICANT CHANGE UP (ref 1.8–7.4)
IMM GRANULOCYTES NFR BLD AUTO: 0.5 % — SIGNIFICANT CHANGE UP (ref 0–1.5)
INR BLD: 1.46 RATIO — HIGH (ref 0.88–1.16)
LYMPHOCYTES # BLD AUTO: 1.31 K/UL — SIGNIFICANT CHANGE UP (ref 1–3.3)
LYMPHOCYTES # BLD AUTO: 14.4 % — SIGNIFICANT CHANGE UP (ref 13–44)
MCHC RBC-ENTMCNC: 29.3 PG — SIGNIFICANT CHANGE UP (ref 27–34)
MCHC RBC-ENTMCNC: 31.3 GM/DL — LOW (ref 32–36)
MCV RBC AUTO: 93.5 FL — SIGNIFICANT CHANGE UP (ref 80–100)
MONOCYTES # BLD AUTO: 0.97 K/UL — HIGH (ref 0–0.9)
MONOCYTES NFR BLD AUTO: 10.6 % — SIGNIFICANT CHANGE UP (ref 2–14)
NEUTROPHILS # BLD AUTO: 6.73 K/UL — SIGNIFICANT CHANGE UP (ref 1.8–7.4)
NEUTROPHILS NFR BLD AUTO: 73.9 % — SIGNIFICANT CHANGE UP (ref 43–77)
NRBC # BLD: 0 /100 WBCS — SIGNIFICANT CHANGE UP
NRBC # FLD: 0 K/UL — SIGNIFICANT CHANGE UP
PLATELET # BLD AUTO: 329 K/UL — SIGNIFICANT CHANGE UP (ref 150–400)
POTASSIUM SERPL-MCNC: 5 MMOL/L — SIGNIFICANT CHANGE UP (ref 3.5–5.3)
POTASSIUM SERPL-SCNC: 5 MMOL/L — SIGNIFICANT CHANGE UP (ref 3.5–5.3)
PROT SERPL-MCNC: 8.7 G/DL — HIGH (ref 6–8.3)
PROTHROM AB SERPL-ACNC: 17 SEC — HIGH (ref 10.5–13.4)
RBC # BLD: 3.38 M/UL — LOW (ref 4.2–5.8)
RBC # FLD: 21 % — HIGH (ref 10.3–14.5)
RSV RNA NPH QL NAA+NON-PROBE: SIGNIFICANT CHANGE UP
SARS-COV-2 RNA SPEC QL NAA+PROBE: SIGNIFICANT CHANGE UP
SODIUM SERPL-SCNC: 143 MMOL/L — SIGNIFICANT CHANGE UP (ref 135–145)
WBC # BLD: 9.12 K/UL — SIGNIFICANT CHANGE UP (ref 3.8–10.5)
WBC # FLD AUTO: 9.12 K/UL — SIGNIFICANT CHANGE UP (ref 3.8–10.5)

## 2022-04-20 PROCEDURE — 99223 1ST HOSP IP/OBS HIGH 75: CPT

## 2022-04-20 PROCEDURE — 99285 EMERGENCY DEPT VISIT HI MDM: CPT | Mod: CS,25

## 2022-04-20 PROCEDURE — 93010 ELECTROCARDIOGRAM REPORT: CPT

## 2022-04-20 PROCEDURE — 71045 X-RAY EXAM CHEST 1 VIEW: CPT | Mod: 26

## 2022-04-20 PROCEDURE — 99497 ADVNCD CARE PLAN 30 MIN: CPT | Mod: 25

## 2022-04-20 PROCEDURE — 99223 1ST HOSP IP/OBS HIGH 75: CPT | Mod: 57

## 2022-04-20 RX ORDER — APIXABAN 2.5 MG/1
5 TABLET, FILM COATED ORAL EVERY 12 HOURS
Refills: 0 | Status: DISCONTINUED | OUTPATIENT
Start: 2022-04-20 | End: 2022-04-24

## 2022-04-20 RX ORDER — ALLOPURINOL 300 MG
1 TABLET ORAL
Qty: 0 | Refills: 0 | DISCHARGE

## 2022-04-20 RX ORDER — SODIUM CHLORIDE 9 MG/ML
500 INJECTION, SOLUTION INTRAVENOUS ONCE
Refills: 0 | Status: COMPLETED | OUTPATIENT
Start: 2022-04-20 | End: 2022-04-20

## 2022-04-20 RX ORDER — PANTOPRAZOLE SODIUM 20 MG/1
1 TABLET, DELAYED RELEASE ORAL
Qty: 0 | Refills: 0 | DISCHARGE

## 2022-04-20 RX ORDER — BUDESONIDE AND FORMOTEROL FUMARATE DIHYDRATE 160; 4.5 UG/1; UG/1
2 AEROSOL RESPIRATORY (INHALATION)
Refills: 0 | Status: DISCONTINUED | OUTPATIENT
Start: 2022-04-20 | End: 2022-04-26

## 2022-04-20 RX ORDER — ALLOPURINOL 300 MG
100 TABLET ORAL DAILY
Refills: 0 | Status: DISCONTINUED | OUTPATIENT
Start: 2022-04-20 | End: 2022-04-21

## 2022-04-20 RX ORDER — AMLODIPINE BESYLATE 2.5 MG/1
1 TABLET ORAL
Qty: 0 | Refills: 0 | DISCHARGE

## 2022-04-20 RX ORDER — CHOLECALCIFEROL (VITAMIN D3) 125 MCG
1 CAPSULE ORAL
Qty: 0 | Refills: 0 | DISCHARGE

## 2022-04-20 RX ORDER — COLESEVELAM HYDROCHLORIDE 625 MG/1
1 TABLET, FILM COATED ORAL
Qty: 0 | Refills: 0 | DISCHARGE

## 2022-04-20 RX ORDER — LEVOTHYROXINE SODIUM 125 MCG
1 TABLET ORAL
Qty: 0 | Refills: 0 | DISCHARGE

## 2022-04-20 RX ORDER — APIXABAN 2.5 MG/1
1 TABLET, FILM COATED ORAL
Qty: 0 | Refills: 0 | DISCHARGE

## 2022-04-20 RX ORDER — TAMSULOSIN HYDROCHLORIDE 0.4 MG/1
1 CAPSULE ORAL
Qty: 0 | Refills: 0 | DISCHARGE

## 2022-04-20 RX ORDER — PREGABALIN 225 MG/1
1 CAPSULE ORAL
Qty: 0 | Refills: 0 | DISCHARGE

## 2022-04-20 RX ORDER — TIOTROPIUM BROMIDE 18 UG/1
1 CAPSULE ORAL; RESPIRATORY (INHALATION) DAILY
Refills: 0 | Status: DISCONTINUED | OUTPATIENT
Start: 2022-04-20 | End: 2022-04-26

## 2022-04-20 RX ORDER — LEVOTHYROXINE SODIUM 125 MCG
25 TABLET ORAL DAILY
Refills: 0 | Status: DISCONTINUED | OUTPATIENT
Start: 2022-04-20 | End: 2022-04-26

## 2022-04-20 RX ADMIN — SODIUM CHLORIDE 500 MILLILITER(S): 9 INJECTION, SOLUTION INTRAVENOUS at 18:46

## 2022-04-20 NOTE — ED ADULT NURSE NOTE - OBJECTIVE STATEMENT
Pt received in Tr. A, A&OX3 pt states he was sent in here from [facility] for left sided pneumothorax, c/o using more oxygen than usual at home and has low O2 levels x couple weeks. went to get an X-ray taken this morning and was sent to ED for further eval. Pt has hx of lung CA, received Keytruda infusion this AM. States he is on 2L O2 baseline at home, has been increasing up to 3-4L. Currently on 3L, O2 sat 96-97%. Pt has colostomy bag. Stage 3 pressure wound noted on sacral area, dressing in place. Denies chest pain, fevers. Will continue to monitor.

## 2022-04-20 NOTE — H&P ADULT - PROBLEM SELECTOR PLAN 4
Noted to have sacral decubitus ulcer. Seeing wound care outpatient. Was recently debrided 4/19.  - Frequent turning  - Continue wound care and dressing changes

## 2022-04-20 NOTE — H&P ADULT - ASSESSMENT
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 Pt presenting with increasing SOB, found to have L pneumothorax.

## 2022-04-20 NOTE — H&P ADULT - ATTENDING COMMENTS
81 yr old male with a pmh of COPD on home O2 @ 3L NC PRN, Pneumothorax (most recently 1/15/22), Vit D deficiency, HTN, HLD, Bladder Ca with pulmonary mets s/p chemo (6 cycles of carboplatin + gemcitabine - completed 12/2021, now on Keytruda started Feb 2022, every 3 weeks, s/p 3 treatments now), moderate pHTN, sacral decubitus ulcer, RUE DVT ddx 4/13/22 on eliquis, recent admission for large bowel obstruction s/p transverse loop colostomy in March 2022 who presents with increasing SOB over the past 2 weeks. Today after receiving chemotherapy he had a CXR for SOB which showed a left pneumothorax. Pt admitts to using his O2 continuously within the past few weeks (previously PRN)  Denies  headache, dizziness, chest pain, palpitations, abdominal pain, joint pain, diarrhea/constipation, urinary symptoms.   Vitals in the ED: T97, HR 88, /59, RR 16 satting 97% 4L    CXR interpreted by radiology: Bilateral patchy opacities, increased in size and density in the right upper lobe. Medium left pneumothorax.  EKG interpreted by myself: NSR    REVIEW OF SYSTEMS:    CONSTITUTIONAL: No weakness, fevers or chills  EYES/ENT: No visual changes;  No dysphagia; No sore throat; No rhinorrhea; No sinus pain/pressure  NECK: No pain or stiffness  RESPIRATORY: No cough, wheezing, hemoptysis; + shortness of breath  CARDIOVASCULAR: No chest pain or palpitations; No lower extremity edema  GASTROINTESTINAL: No abdominal or epigastric pain. No nausea, vomiting, or hematemesis; No diarrhea or constipation. No melena or hematochezia.  GENITOURINARY: No dysuria, frequency or hematuria  NEUROLOGICAL: No numbness or weakness  MSK: ambulates independently   SKIN: No itching, burning, rashes, or lesions   All other review of systems is negative unless indicated above.    PHYSICAL EXAM:  GENERAL: NAD, thin, frail   HEAD:  Atraumatic, Normocephalic  EYES: EOMI, PERRL, conjunctiva and sclera clear  NECK: Supple, No JVD  CHEST/LUNG: decreased air entry in the Left lung throughout; No wheezes, rales or rhonchi  HEART: Regular rate and rhythm; No murmurs, rubs, or gallops, (+)S1, S2  ABDOMEN: Soft, Nontender, Nondistended; Normal Bowel sounds, ostomy site well perfused  EXTREMITIES:  2+ Peripheral Pulses, No clubbing, cyanosis, or edema  PSYCH: normal mood and affect  NEUROLOGY: AAOx3, non-focal  SKIN: sacral ulcer dressed    Pneumothorax, left  New  CXR with medium left pneumothorax    On 4L NC- wean as tolerated for sats >92$  thoracic surgery consulted: serial CXR, no intervention at this time    BRISA  New  Cr 2.57 (1.2 in 3/2022)  2/2 recent keytruda vs prerenal   LR 75ml.hr  avoid nephrotoxic agents  monitor     COPD  Chronic stable   continue home inhalers    Sacral ulcer  Chronic stable  Frequent repositioning   Wound consult     DVT of Rt upper extremity  Acute as within 3 months  Continue eliquis 5mg BID    Remainder as above

## 2022-04-20 NOTE — ED PROVIDER NOTE - PSYCHIATRIC, MLM
Bed: [facility] 01  Expected date:   Expected time:   Means of arrival:   Comments:   Alert and oriented to person, place, time/situation. normal mood and affect. no apparent risk to self or others.
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
## 2022-04-20 NOTE — H&P ADULT - NSHPREVIEWOFSYSTEMS_GEN_ALL_CORE
REVIEW OF SYSTEMS:  CONSTITUTIONAL: No fever, chills, night sweats, or fatigue  EYES: No eye pain, visual disturbances, or discharge  ENMT:  No difficulty hearing, tinnitus, vertigo; No sinus or throat pain  NECK: No pain or stiffness  RESPIRATORY: No cough, wheezing, or hemoptysis; No shortness of breath  CARDIOVASCULAR: No chest pain, palpitations, dizziness, or leg swelling  GASTROINTESTINAL: No abdominal or epigastric pain. No nausea, vomiting, or hematemesis; No diarrhea or constipation. No melena or hematochezia.  GENITOURINARY: No dysuria, frequency, hematuria, or incontinence  NEUROLOGICAL: No headaches, memory loss, loss of strength, numbness, or tremors  SKIN: No itching, burning, rashes, or lesions   LYMPH NODES: No enlarged glands  ENDOCRINE: No heat or cold intolerance; No hair loss  MUSCULOSKELETAL: No joint pain or swelling; No muscle, back, or extremity pain  PSYCHIATRIC: No depression, anxiety, mood swings, or difficulty sleeping  HEME/LYMPH: No easy bruising, or bleeding gums  ALLERY AND IMMUNOLOGIC: No hives or eczema REVIEW OF SYSTEMS:  CONSTITUTIONAL: No fever, chills, night sweats, or fatigue  EYES: No eye pain, visual disturbances, or discharge  ENMT:  No difficulty hearing, tinnitus, vertigo; No sinus or throat pain  NECK: No pain or stiffness  RESPIRATORY: + SOB initially, now improved on O2, No cough, wheezing, or hemoptysis  CARDIOVASCULAR: No chest pain, palpitations, dizziness, or leg swelling  GASTROINTESTINAL: No abdominal or epigastric pain. No nausea, vomiting, or hematemesis; No diarrhea or constipation. No melena or hematochezia.  GENITOURINARY: +urinary incontinence, No dysuria, frequency, hematuria  NEUROLOGICAL: No headaches, memory loss, loss of strength, numbness, or tremors  SKIN: ulcer in sacrum   MUSCULOSKELETAL: No joint pain or swelling; No muscle, back, or extremity pain  HEME/LYMPH: No easy bruising or abnormal bleeding

## 2022-04-20 NOTE — H&P ADULT - PROBLEM SELECTOR PLAN 7
DVT ppx: on eliquis   Diet: Regular   Dispo: Pending improvement, PT eval  Code: DNR/DNI  HCP: Sotero Gomez (wife)

## 2022-04-20 NOTE — ED PROVIDER NOTE - OBJECTIVE STATEMENT
82yo M w/ significant PMHx of bladder CA, lung CA, colostomy, HLD, reporting to the ED with shortness of breath. Patient is an active chemotherapy patient and received Keytruda earlier today and they did a CXR which confirmed a L pneumothorax. Patient reports significant PMHx of pneumothorax on the left and reports they observed the pneumothorax at that time. He is on nasal cannula at all times and reports increase in his oxygen requirement from 2-3 liters. He denies any chest pain but reports increased shortness of breath.

## 2022-04-20 NOTE — H&P ADULT - PROBLEM SELECTOR PLAN 1
Increasing oxygen requirements with dyspnea, CXR showing L pneumothorax. Hypoxia improved with 4L NC. Likely spontaneous 2/2 COPD. Patient with hx of previous episodes in same side.   - Evaluated by CTS in ED, No acute intervention or chest tube needed at this time  - Serial CXR to monitor for improvement  - supplemental O2 as needed

## 2022-04-20 NOTE — ED ADULT NURSE NOTE - CHIEF COMPLAINT QUOTE
states" I was sent in here from [facility] as I am having left sided pneumothorax, c/o using more oxygen than usual at home and has low O2 levels and went to get an X-ray taken this morning." H/O lung ca, on immunotherapy. had immunotherapy infusion this morning. on Eliquis for PE. patient is on home Oxygen at 2liters and went up to 4 liters.

## 2022-04-20 NOTE — H&P ADULT - HISTORY OF PRESENT ILLNESS
82 y/o male, former smoker (2 PPW x40yrs, quit 12/2021), hx of COPD (on home O2 @ 3L NC), Pneumothorax (most recently 1/15/22), Vit D deficiency, HTN, HLD, Bladder Ca unresectable s/p chemo (6 cycles of carboplatin + gemcitabine - completed 12/2021), moderate pHTN, recent admission for SBO/ileus s/p colostomy in March 2022. Pt presenting with increased shortness of breath. He was started on Keytruda in Feb 2022 and last received dose earlier today. Patient was found to have a L pneumothorax that was confirmed by CXR.  82 y/o male, former smoker (2 PPW x40yrs, quit 12/2021), hx of COPD (on home O2 @ 3L NC), Pneumothorax (most recently 1/15/22), Vit D deficiency, HTN, HLD, Bladder Ca unresectable s/p chemo (6 cycles of carboplatin + gemcitabine - completed 12/2021, now on Keytruda started Feb 2022), moderate pHTN, recent admission for large bowel obstruction s/p transverse loop colostomy in March 2022. Pt presenting with increased shortness of breath. He is on Keytruda and last received dose earlier today. Patient was found to have a L pneumothorax that was confirmed by CXR.  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 Pt presenting with increased shortness of breath. He is on Keytruda and last received dose earlier today. Patient was having increasing O2 requirements at home so outpatient provider ordered CXR for him. Found to have a L pneumothorax so patient advised to come to ED. Patient is currently feeling well and SOB improved. Denied chest pain, abdominal pain, changes in bowel habits. Does endorse urinary incontinence and inability to feel when he needs to urinate, which has been present since last year. No weakness in legs. No issues with ostomy.     Spoke with patient's wife Sotero. Confirmed DNR/DNI status. Patient currently has visiting nurse that comes for ostomy care. Patient also has bedsore and sees wound care. Patient is ambulatory and functional.

## 2022-04-20 NOTE — ED PROVIDER NOTE - CLINICAL SUMMARY MEDICAL DECISION MAKING FREE TEXT BOX
Coats covering Dr. Gordon   80yo M w/ significant PMHx of bladder CA, lung CA, colostomy, HLD, reporting to the ED with shortness of breath. This patient was staffed with my supervising physician, Dr. Haynes; medical record was reviewed. Upon arrival to the ED, patient was promptly evaluated. Decrease breath sounds in the L lung base; POCUS thorax confirmed no lung sliding. Given his significant PMHx of lung cancer and pneumothoraces as well as pulmonary blebs, we will refrain from placing chest tube unless patient is in extremis. Patient is speaking in full sentences, appropriate mentation, no increased work of breathing. 80yo M w/ significant PMHx of bladder CA, lung CA, colostomy, HLD, reporting to the ED with shortness of breath. This patient was staffed with my supervising physician, Dr. Haynes; medical record was reviewed. Upon arrival to the ED, patient was promptly evaluated. Decrease breath sounds in the L lung base; POCUS thorax confirmed no lung sliding. Given his significant PMHx of lung cancer and pneumothoraces as well as pulmonary blebs, we will refrain from placing chest tube unless patient is in extremis. Patient is speaking in full sentences, appropriate mentation, no increased work of breathing.    Attending MD Haynes : Pt here with likely L PTX that is contributing to his increased O2 requirements. 82yo M w/ significant PMHx of bladder CA, lung CA, colostomy, HLD, reporting to the ED with shortness of breath. This patient was staffed with my supervising physician, Dr. Haynes; medical record was reviewed. Upon arrival to the ED, patient was promptly evaluated. Decrease breath sounds in the L lung base; POCUS thorax confirmed no lung sliding. Given his significant PMHx of lung cancer and pneumothoraces as well as pulmonary blebs, we will refrain from placing chest tube unless patient is in extremis. Patient is speaking in full sentences, appropriate mentation, no increased work of breathing.    Attending MD Haynes : Pt here with likely L PTX that is contributing to his increased O2 requirements. No chest pain, doubt NSTEMI. Patient with previous PTX resolved based on prior CXR. Current PTX new. Will discuss with patient's pulmonologist with regards to optimal management given patient has multiple blebs, extensive lung ca and is on AC as to best management steps. 80yo M w/ significant PMHx of bladder CA, lung CA, colostomy, HLD, reporting to the ED with shortness of breath. This patient was staffed with my supervising physician, Dr. Haynes; medical record was reviewed. Upon arrival to the ED, patient was promptly evaluated. Decrease breath sounds in the L lung base; POCUS thorax confirmed no lung sliding. Given his significant PMHx of lung cancer and pneumothoraces as well as pulmonary blebs, we will refrain from placing chest tube unless patient is in extremis. Patient is speaking in full sentences, appropriate mentation, no increased work of breathing. Plan to discuss with pulmonology team (he follows with Dr. Lopez. Plan to also discuss with Cardiothoracic Surgery considering in his previous pneumothorax in January of this year it was followed by CT surgery.     Attending MD Haynes : Pt here with likely L PTX that is contributing to his increased O2 requirements. No chest pain, doubt NSTEMI. Patient with previous PTX resolved based on prior CXR. Current PTX new. Will discuss with patient's pulmonologist with regards to optimal management given patient has multiple blebs, extensive lung ca and is on AC as to best management steps. 82yo M w/ significant PMHx of bladder CA, lung CA, colostomy, HLD, reporting to the ED with shortness of breath. This patient was staffed with my supervising physician, Dr. Haynes; medical record was reviewed. Upon arrival to the ED, patient was promptly evaluated. Decrease breath sounds in the L lung base; POCUS thorax confirmed no lung sliding. Given his significant PMHx of lung cancer and pneumothoraces as well as pulmonary blebs, we will refrain from placing chest tube unless patient is in extremis. Patient is speaking in full sentences, appropriate mentation, no increased work of breathing. Plan to discuss with pulmonology team (he follows with Dr. Lopez. Plan to also discuss with Cardiothoracic Surgery considering in his previous pneumothorax in January of this year it was followed by CT surgery.     Attending MD Haynes : Pt here with likely L PTX that is contributing to his increased O2 requirements. No chest pain, doubt NSTEMI. Patient with previous PTX resolved based on prior CXR. Current PTX new. Will discuss with patient's pulmonologist with regards to optimal management given patient has multiple blebs, extensive lung ca and is on AC as to best management steps. Discussed with CT surgery (ABBY Roman), they are agreeable to evaluate. Re-evaluated patient as he has BRISA with worsening of his renal function. Of note, per patient, he has bilateral kidney stents and make urine. Bedside US of the bladder shows no evidence of distension. I discussed code status and he reports DNR/DNI. Of note, in case of extremis, he would want the team to place thoracostomy. I discussed the care of this patient with  of the medicine service. All questions were answered, hand-off provided, and the patient was admitted in stable condition. 82yo M w/ significant PMHx of bladder CA, lung CA, colostomy, HLD, reporting to the ED with shortness of breath. This patient was staffed with my supervising physician, Dr. Haynes; medical record was reviewed. Upon arrival to the ED, patient was promptly evaluated. Decrease breath sounds in the L lung base; POCUS thorax confirmed no lung sliding. Given his significant PMHx of lung cancer and pneumothoraces as well as pulmonary blebs, we will refrain from placing chest tube unless patient is in extremis. Patient is speaking in full sentences, appropriate mentation, no increased work of breathing. Plan to discuss with pulmonology team (he follows with Dr. Lopez. Plan to also discuss with Cardiothoracic Surgery considering in his previous pneumothorax in January of this year it was followed by CT surgery.     Attending MD Haynes : Pt here with likely L PTX that is contributing to his increased O2 requirements. No chest pain, doubt NSTEMI. Patient with previous PTX resolved based on prior CXR. Current PTX new. Will discuss with patient's pulmonologist with regards to optimal management given patient has multiple blebs, extensive lung ca and is on AC as to best management steps. Discussed with CT surgery (ABBY Roman), they are agreeable to evaluate. Re-evaluated patient as he has BRISA with worsening of his renal function. Of note, per patient, he has bilateral kidney stents and make urine. Bedside US of the bladder shows no evidence of distension. I discussed code status and he reports DNR/DNI. Of note, in case of extremis, he would want the team to place thoracostomy. I discussed the care of this patient with Dr. Tai of the medicine service. All questions were answered, hand-off provided, and the patient was admitted in stable condition on continuous pulse ox and telemetry.

## 2022-04-20 NOTE — H&P ADULT - PROBLEM SELECTOR PLAN 2
Noted to have BRISA. Baseline Scr 1.2 in March 2022. May be 2/2 recent immunotherapy with Keytruda or pre-renal.   - s/p 500cc bolus LR in ED   - Ordered for Renal US   - bladder scan prn if not urinating  - Strict I/Os and avoid nephrotoxic meds   - Monitor BMP and SCr Noted to have BRISA. Baseline Scr 1.2 in March 2022. May be 2/2 recent immunotherapy with Keytruda or pre-renal.   - s/p 500cc bolus LR in ED   - Ordered for Renal US   - bladder scan prn if not urinating  - Strict I/Os and avoid nephrotoxic meds   - Monitor BMP and SCr  - Maintenance fluids with LR @75cc/hr

## 2022-04-20 NOTE — H&P ADULT - CONVERSATION DETAILS
DNR/DNI  OK for trial period of BiPAP/CPAP, IV fluids, antibiotics, feeding tube  OK for dialysis, blood transfusions, vasopressors   Not OK for permanent feeding tube

## 2022-04-20 NOTE — ED ADULT NURSE NOTE - NSIMPLEMENTINTERV_GEN_ALL_ED
Implemented All Fall with Harm Risk Interventions:  [place] to call system. Call bell, personal items and telephone within reach. Instruct patient to call for assistance. Room bathroom lighting operational. Non-slip footwear when patient is off stretcher. Physically safe environment: no spills, clutter or unnecessary equipment. Stretcher in lowest position, wheels locked, appropriate side rails in place. Provide visual cue, wrist band, yellow gown, etc. Monitor gait and stability. Monitor for mental status changes and reorient to person, place, and time. Review medications for side effects contributing to fall risk. Reinforce activity limits and safety measures with patient and family. Provide visual clues: red socks.

## 2022-04-20 NOTE — ED ADULT TRIAGE NOTE - CHIEF COMPLAINT QUOTE
states" I was sent in here from [facility] as I am having left sided pneumothorax, c/o using more oxygen than usual at home and has low O2 levels and went to get an X-ray taken this morning." H/O lung ca, on immunotherapy. had immunotherapy infusion this morning. on Eliquis for PE. patient is on home Oxygen at 2liters and went up to 4 liters.

## 2022-04-20 NOTE — H&P ADULT - NSHPSOCIALHISTORY_GEN_ALL_CORE
Social History:    Marital Status:  (   )    (   ) Single    (   )    (  )   Occupation:   Lives with: (  ) alone  (  ) children   (  ) spouse   (  ) parents  (  ) other    Substance Use (street drugs): (  ) never used  (  ) other:  Tobacco Usage:  (   ) never smoked   (   ) former smoker   (   ) current smoker  (     ) pack year  (        ) last cigarette date  Alcohol Usage:  Sexual History: Social History:    Marital Status:  (  X )    (   ) Single    (   )    (  )   Occupation: retired   Lives with: (  ) alone  (  ) children   ( X ) spouse   (  ) parents  (  ) other    Substance Use (street drugs): ( X ) never used  (  ) other:  Tobacco Usage:  former smoker, quit 8 months ago  Alcohol Usage: none

## 2022-04-20 NOTE — H&P ADULT - NSHPLABSRESULTS_GEN_ALL_CORE
04-20    143  |  106  |  30<H>  ----------------------------<  84  5.0   |  22  |  2.57<H>    Ca    8.9      20 Apr 2022 17:53    TPro  8.7<H>  /  Alb  3.2<L>  /  TBili  0.4  /  DBili  x   /  AST  22  /  ALT  10  /  AlkPhos  71  04-20          PT/INR - ( 20 Apr 2022 17:53 )   PT: 17.0 sec;   INR: 1.46 ratio         PTT - ( 20 Apr 2022 17:53 )  PTT:32.3 sec                                        9.9    9.12  )-----------( 329      ( 20 Apr 2022 17:53 )             31.6     CAPILLARY BLOOD GLUCOSE      IMAGING  < from: Xray Chest 1 View- PORTABLE-Urgent (04.20.22 @ 17:24) >    FINDINGS:    The heart is not enlarged.  The trachea is midline.  The mediastinum and wai appear unremarkable.  Bilateral patchy opacities, increased in size and density in the right   upper lobe.  No pleural effusion. Medium left pneumothorax.  No acute bony abnormality is noted.    IMPRESSION:    Bilateral patchy opacities, increased in size and density in the right   upper lobe.  Medium left pneumothorax.    < end of copied text >

## 2022-04-20 NOTE — H&P ADULT - PROBLEM SELECTOR PLAN 3
Former smoker. On 3L O2 at home, but used more frequently in past week. No significant wheezing on exam. Suspect dyspnea from pneumothorax rather than COPD exacerbation  - continue home COPD meds

## 2022-04-20 NOTE — H&P ADULT - NSHPPHYSICALEXAM_GEN_ALL_CORE
PHYSICAL EXAM:  GENERAL: NAD, well-groomed, well-developed  HEAD:  Atraumatic, Normocephalic  EYES: EOMI, PERRLA, conjunctiva and sclera clear  ENMT: No tonsillar erythema, exudates, or enlargement; Moist mucous membranes, Good dentition, No lesions  NECK: Supple, No JVD, Normal thyroid  CHEST/LUNG: Clear to percussion bilaterally; No rales, rhonchi, wheezing, or rubs  HEART: Regular rate and rhythm; No murmurs, rubs, or gallops  ABDOMEN: Soft, Nontender, Nondistended; Bowel sounds present  VASCULAR:  2+ Peripheral Pulses, No clubbing, cyanosis, or edema  LYMPH: No lymphadenopathy noted  SKIN: No rashes or lesions  NERVOUS SYSTEM:  Alert & Oriented X3, Good concentration; Motor Strength 5/5 B/L upper and lower extremities; DTRs 2+ intact and symmetric PHYSICAL EXAM:  GENERAL: NAD, well-groomed, well-developed  HEAD:  Atraumatic, Normocephalic  EYES: EOMI, PERRLA, conjunctiva and sclera clear  ENMT: No tonsillar erythema, exudates, or enlargement; Moist mucous membranes  CHEST/LUNG: CTA in R lung. Absent/decreased breath sounds in L lung.   HEART: Regular rate and rhythm; No murmurs, rubs, or gallops  ABDOMEN: Soft, Nontender, Nondistended; Bowel sounds present, Ostomy in mid-left abdomen. Bag in place with brown stool  VASCULAR:  2+ Peripheral Pulses, No clubbing, cyanosis, or edema  SKIN: Sacral decubitus ulcer   NERVOUS SYSTEM:  Alert & Oriented X3; Motor Strength 5/5 B/L upper and lower extremities

## 2022-04-20 NOTE — H&P ADULT - PROBLEM SELECTOR PLAN 6
Currently receiving immunotherapy with Keytruda. Last session earlier today 4/20.   - Suspect bladder cancer contributing to urinary incontinence.   - Monitor for urinary retention  - Likely no other plans during this admission.

## 2022-04-21 DIAGNOSIS — N39.0 URINARY TRACT INFECTION, SITE NOT SPECIFIED: ICD-10-CM

## 2022-04-21 LAB
ALBUMIN SERPL ELPH-MCNC: 2.7 G/DL — LOW (ref 3.3–5)
ALP SERPL-CCNC: 63 U/L — SIGNIFICANT CHANGE UP (ref 40–120)
ALT FLD-CCNC: 8 U/L — SIGNIFICANT CHANGE UP (ref 4–41)
ANION GAP SERPL CALC-SCNC: 11 MMOL/L — SIGNIFICANT CHANGE UP (ref 7–14)
APPEARANCE UR: ABNORMAL
AST SERPL-CCNC: 18 U/L — SIGNIFICANT CHANGE UP (ref 4–40)
BACTERIA # UR AUTO: ABNORMAL
BILIRUB SERPL-MCNC: 0.4 MG/DL — SIGNIFICANT CHANGE UP (ref 0.2–1.2)
BILIRUB UR-MCNC: NEGATIVE — SIGNIFICANT CHANGE UP
BUN SERPL-MCNC: 28 MG/DL — HIGH (ref 7–23)
CALCIUM SERPL-MCNC: 8.9 MG/DL — SIGNIFICANT CHANGE UP (ref 8.4–10.5)
CHLORIDE SERPL-SCNC: 110 MMOL/L — HIGH (ref 98–107)
CO2 SERPL-SCNC: 23 MMOL/L — SIGNIFICANT CHANGE UP (ref 22–31)
COLOR SPEC: ABNORMAL
CREAT SERPL-MCNC: 2.3 MG/DL — HIGH (ref 0.5–1.3)
DIFF PNL FLD: ABNORMAL
EGFR: 28 ML/MIN/1.73M2 — LOW
EPI CELLS # UR: 1 /HPF — SIGNIFICANT CHANGE UP (ref 0–5)
GLUCOSE SERPL-MCNC: 130 MG/DL — HIGH (ref 70–99)
GLUCOSE UR QL: NEGATIVE — SIGNIFICANT CHANGE UP
HCT VFR BLD CALC: 33 % — LOW (ref 39–50)
HGB BLD-MCNC: 10.2 G/DL — LOW (ref 13–17)
KETONES UR-MCNC: NEGATIVE — SIGNIFICANT CHANGE UP
LEUKOCYTE ESTERASE UR-ACNC: ABNORMAL
MAGNESIUM SERPL-MCNC: 1.8 MG/DL — SIGNIFICANT CHANGE UP (ref 1.6–2.6)
MCHC RBC-ENTMCNC: 28.9 PG — SIGNIFICANT CHANGE UP (ref 27–34)
MCHC RBC-ENTMCNC: 30.9 GM/DL — LOW (ref 32–36)
MCV RBC AUTO: 93.5 FL — SIGNIFICANT CHANGE UP (ref 80–100)
NITRITE UR-MCNC: NEGATIVE — SIGNIFICANT CHANGE UP
NRBC # BLD: 0 /100 WBCS — SIGNIFICANT CHANGE UP
NRBC # FLD: 0 K/UL — SIGNIFICANT CHANGE UP
PH UR: 5.5 — SIGNIFICANT CHANGE UP (ref 5–8)
PHOSPHATE SERPL-MCNC: 3.7 MG/DL — SIGNIFICANT CHANGE UP (ref 2.5–4.5)
PLATELET # BLD AUTO: 331 K/UL — SIGNIFICANT CHANGE UP (ref 150–400)
POTASSIUM SERPL-MCNC: 4.4 MMOL/L — SIGNIFICANT CHANGE UP (ref 3.5–5.3)
POTASSIUM SERPL-SCNC: 4.4 MMOL/L — SIGNIFICANT CHANGE UP (ref 3.5–5.3)
PROT SERPL-MCNC: 7.6 G/DL — SIGNIFICANT CHANGE UP (ref 6–8.3)
PROT UR-MCNC: ABNORMAL
RBC # BLD: 3.53 M/UL — LOW (ref 4.2–5.8)
RBC # FLD: 20.8 % — HIGH (ref 10.3–14.5)
RBC CASTS # UR COMP ASSIST: 38 /HPF — HIGH (ref 0–4)
SODIUM SERPL-SCNC: 144 MMOL/L — SIGNIFICANT CHANGE UP (ref 135–145)
SP GR SPEC: 1.01 — SIGNIFICANT CHANGE UP (ref 1–1.05)
UROBILINOGEN FLD QL: SIGNIFICANT CHANGE UP
WBC # BLD: 7.89 K/UL — SIGNIFICANT CHANGE UP (ref 3.8–10.5)
WBC # FLD AUTO: 7.89 K/UL — SIGNIFICANT CHANGE UP (ref 3.8–10.5)
WBC UR QL: >720 /HPF — HIGH (ref 0–5)

## 2022-04-21 PROCEDURE — 99233 SBSQ HOSP IP/OBS HIGH 50: CPT

## 2022-04-21 PROCEDURE — 71045 X-RAY EXAM CHEST 1 VIEW: CPT | Mod: 26

## 2022-04-21 PROCEDURE — 76770 US EXAM ABDO BACK WALL COMP: CPT | Mod: 26

## 2022-04-21 RX ORDER — ALLOPURINOL 300 MG
50 TABLET ORAL DAILY
Refills: 0 | Status: DISCONTINUED | OUTPATIENT
Start: 2022-04-21 | End: 2022-04-26

## 2022-04-21 RX ORDER — SODIUM CHLORIDE 9 MG/ML
1000 INJECTION, SOLUTION INTRAVENOUS
Refills: 0 | Status: DISCONTINUED | OUTPATIENT
Start: 2022-04-21 | End: 2022-04-22

## 2022-04-21 RX ORDER — CEFTRIAXONE 500 MG/1
1000 INJECTION, POWDER, FOR SOLUTION INTRAMUSCULAR; INTRAVENOUS EVERY 24 HOURS
Refills: 0 | Status: COMPLETED | OUTPATIENT
Start: 2022-04-21 | End: 2022-04-25

## 2022-04-21 RX ORDER — SODIUM CHLORIDE 9 MG/ML
1000 INJECTION, SOLUTION INTRAVENOUS
Refills: 0 | Status: DISCONTINUED | OUTPATIENT
Start: 2022-04-21 | End: 2022-04-21

## 2022-04-21 RX ADMIN — APIXABAN 5 MILLIGRAM(S): 2.5 TABLET, FILM COATED ORAL at 17:52

## 2022-04-21 RX ADMIN — Medication 50 MILLIGRAM(S): at 11:55

## 2022-04-21 RX ADMIN — SODIUM CHLORIDE 75 MILLILITER(S): 9 INJECTION, SOLUTION INTRAVENOUS at 04:08

## 2022-04-21 RX ADMIN — CEFTRIAXONE 100 MILLIGRAM(S): 500 INJECTION, POWDER, FOR SOLUTION INTRAMUSCULAR; INTRAVENOUS at 11:57

## 2022-04-21 RX ADMIN — SODIUM CHLORIDE 75 MILLILITER(S): 9 INJECTION, SOLUTION INTRAVENOUS at 19:24

## 2022-04-21 RX ADMIN — APIXABAN 5 MILLIGRAM(S): 2.5 TABLET, FILM COATED ORAL at 05:21

## 2022-04-21 RX ADMIN — BUDESONIDE AND FORMOTEROL FUMARATE DIHYDRATE 2 PUFF(S): 160; 4.5 AEROSOL RESPIRATORY (INHALATION) at 11:58

## 2022-04-21 RX ADMIN — SODIUM CHLORIDE 75 MILLILITER(S): 9 INJECTION, SOLUTION INTRAVENOUS at 14:26

## 2022-04-21 RX ADMIN — SODIUM CHLORIDE 75 MILLILITER(S): 9 INJECTION, SOLUTION INTRAVENOUS at 17:52

## 2022-04-21 RX ADMIN — Medication 25 MICROGRAM(S): at 05:21

## 2022-04-21 RX ADMIN — BUDESONIDE AND FORMOTEROL FUMARATE DIHYDRATE 2 PUFF(S): 160; 4.5 AEROSOL RESPIRATORY (INHALATION) at 21:00
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
## 2022-04-21 NOTE — PROGRESS NOTE ADULT - PROBLEM SELECTOR PLAN 3
Former smoker. On 3L O2 at home, but used more frequently in past week. No significant wheezing on exam. Suspect dyspnea from pneumothorax rather than COPD exacerbation  - continue home COPD meds Noted to have BRISA. Baseline Scr 1.2 in March 2022. May be 2/2 recent immunotherapy with Keytruda or pre-renal.   - s/p 500cc bolus LR in ED   - Ordered for Renal US to r/o obstruction  - bladder scan prn if not urinating  - Strict I/Os and avoid nephrotoxic meds   - Monitor BMP and SCr  - Maintenance fluids with LR @75cc/hr  -Cr slightly improved to 2.3 after IVF. C/w IVF for now

## 2022-04-21 NOTE — PATIENT PROFILE ADULT - FALL HARM RISK - HARM RISK INTERVENTIONS

## 2022-04-21 NOTE — ED ADULT NURSE REASSESSMENT NOTE - NS ED NURSE REASSESS COMMENT FT1
Report given to ESS[facility] 1 RN. Pt A&O x 4 at time of transfer.
PT A&OX4.  No distress noted.  Resp WDL.  O2 via NC in place and tolerating well.  Colostomy intact.  Will continue to monitor.

## 2022-04-21 NOTE — CONSULT NOTE ADULT - ASSESSMENT
Hematology/Oncology consulted on this patient known to [facility] and follows with Dr Willingham for muscle invasive bladder cancer and had completed treatment with [facility] 12/21 who was previously admitted 1/15/22 with dyspnea and found to have a small to moderate left pneumothorax, he was discharged on 1/17 without surgical intervention and improvement in Pneumothorax. He was readmitted on 3/11 with abdominal pain and constipation, found to have a small bowel obstruction/Ileus and underwent  transverse loop colostomy for LBO secondary to large compressing bladder mass on 3/13 and was discharged home on 3/18. He presented to [facility] ED on 4/20 after chest xray   at [facility] revealed a moderate size pneumothorax with  a Mild mediastinal shift. He is currently receiving Pembrolizumab LD 4/20/22    Muscle invasive bladder cancer  --undergoing care with Dr Willingham at [facility]  --S/P Pembrolizumab LD 4/20/22  --Will follow with [facility] after discharge    Pneumothorax (Left)  --02 sat 100% room air  --CXR  IMPRESSION:  Bilateral patchy opacities, increased in size and density in the right   upper lobe.  Medium left pneumothorax.  --pulmonary to consult/CTS following    UTI  --Large Leuks/wbc  --urine culture pending  --US Kidney and bladder pending  --urology to consult    BRISA  --creatinine 2.57  --nephrology to consult    DVT  --Occlusive thrombus seen within the right cephalic vein in the upper arm and lower arm at wrist  -- Has started eliquis 5 mg twice a day since 4/6/22    Patient may follow with Dr Willingham at [facility] after discharge  Thank you for allowing us to participate in Mr Gomez's care    Diane Rothman NP  Hematology/Oncology  [facility]  [phone] (Office)  [phone] (Alt office)  Evenings and weekends please call MD on call or office

## 2022-04-21 NOTE — PROGRESS NOTE ADULT - PROBLEM SELECTOR PLAN 2
Noted to have BRISA. Baseline Scr 1.2 in March 2022. May be 2/2 recent immunotherapy with Keytruda or pre-renal.   - s/p 500cc bolus LR in ED   - Ordered for Renal US to r/o obstruction  - bladder scan prn if not urinating  - Strict I/Os and avoid nephrotoxic meds   - Monitor BMP and SCr  - Maintenance fluids with LR @75cc/hr  -Cr slightly improved to 2.3 after IVF. C/w IVF for now -patient with positive UA in setting of BRISA and bladder cancer  -patient denies any dysuria or abdominal pain  -per wife has chronic UTIs and has ureteral stents  -would treat for now with ceftriaxone and follow up cultures

## 2022-04-21 NOTE — PROGRESS NOTE ADULT - PROBLEM SELECTOR PLAN 5
Recent RUE DVT noted on 4/13. Started on Eliquis.  - Continue Eliquis 5mg BID Noted to have sacral decubitus ulcer. Seeing wound care outpatient. Was recently debrided 4/19.  - Frequent turning  - Continue wound care and dressing changes

## 2022-04-21 NOTE — PATIENT PROFILE ADULT - PUBLIC BENEFITS
Continuity of Care Form    Patient Name: Harvey Valdivia   :  1958  MRN:  [id number]    [facility] date:  2022  Discharge date:  ***    Code Status Order: Prior   Advance Directives:      Admitting Physician:  Larisa Ramos MD  PCP: WESLY Lee    Discharging Nurse: [facility] Unit/Room#: 3CS-1448/7336-77  Discharging Unit Phone Number: ***    Emergency Contact:   Extended Emergency Contact Information  Primary Emergency Contact: [place] [facility]  Home Phone: [phone]  Mobile Phone: [phone]  Relation: Spouse    Past Surgical History:  Past Surgical History:   Procedure Laterality Date    COLONOSCOPY      CT NEEDLE BIOPSY LUNG PERCUTANEOUS  1/10/2022    CT NEEDLE BIOPSY LUNG PERCUTANEOUS 1/10/2022 [facility] CT SCAN    ESOPHAGUS SURGERY  2019    hernia    FOOT SURGERY  2019    bilateraly hammer toes    UPPER GASTROINTESTINAL ENDOSCOPY         Immunization History:   Immunization History   Administered Date(s) Administered    Tdap (Boostrix, Adacel) 2015, 2019       Active Problems:  Patient Active Problem List   Diagnosis Code    Benign prostatic hyperplasia with post-void dribbling N40.1, N39.43    Urinary frequency R35.0    Psoriasis L40.9    Pneumonia J18.9       Isolation/Infection:   Isolation            No Isolation          Patient Infection Status       Infection Onset Added Last Indicated Last Indicated By Review Planned Expiration Resolved Resolved By    COVID-19 01/10/22 01/10/22 01/10/22 COVID-19, Rapid 22      MRSA 22 MRSA DNA Probe, Nasal        C-diff Rule Out 22 Clostridium difficile toxin/antigen (Ordered)                Nurse Assessment:  Last Vital Signs: /78   Pulse 76   Temp 99.1 °F (37.3 °C) (Oral)   Resp 20   Ht 6' 2\" (1.88 m)   Wt 261 lb 12.8 oz (118.8 kg)   SpO2 93%   BMI 33.61 kg/m²     Last documented pain score (0-10 scale): Pain Level: 0  Last Weight:   Wt Readings from Last 1 Encounters:   22 261 lb 12.8 oz (118.8 kg)     Mental Status:  {IP PT MENTAL STATUS:}    IV Access:  { MARIA ISABEL IV ACCESS:978621461}    Nursing Mobility/ADLs:  Walking   {CHP DME IMTF:208097949}  Transfer  {CHP DME MXIQ:651138820}  Bathing  {CHP DME VEVW:099192794}  Dressing  {CHP DME WEYH:776027633}  Toileting  {CHP DME TNGT:577147165}  Feeding  {CHP DME WDEC:165057675}  Med Admin  {CHP DME EINM:771194319}  Med Delivery   { MARIA ISABEL MED Delivery:283197480}    Wound Care Documentation and Therapy:        Elimination:  Continence: Bowel: {YES / RK:87478}  Bladder: {YES / BQ:16053}  Urinary Catheter: {Urinary Catheter:194018055}   Colostomy/Ileostomy/Ileal Conduit: {YES / TK:02535}       Date of Last BM: ***    Intake/Output Summary (Last 24 hours) at 2022 0830  Last data filed at 1/10/2022 1848  Gross per 24 hour   Intake 1374.78 ml   Output --   Net 1374.78 ml     I/O last 3 completed shifts:   In: 5 [I.V.:5]  Out: -     Safety Concerns:     [address] Safety Concerns:262168828}    Impairments/Disabilities:      [address] Impairments/Disabilities:432503628}    Nutrition Therapy:  Current Nutrition Therapy:   [address] Diet List:631828776}    Routes of Feeding: {P DME Other Feedings:521558331}  Liquids: {Slp liquid thickness:35897}  Daily Fluid Restriction: {CHP DME Yes amt example:274295127}  Last Modified Barium Swallow with Video (Video Swallowing Test): {Done Not Done FLLY:829886509}    Treatments at the Time of Hospital Discharge:   Respiratory Treatments: ***  Oxygen Therapy:  {Therapy; copd oxygen:90423}  Ventilator:    { CC Vent NFZS:430247274}    Rehab Therapies: {THERAPEUTIC INTERVENTION:2629115105}  Weight Bearing Status/Restrictions: [address] Weight Bearin}  Other Medical Equipment (for information only, NOT a DME order):  {EQUIPMENT:151198470}  Other Treatments: ***    Patient's personal belongings (please select all that are sent with patient):  {[facility] DME Belongings:549618404}    RN SIGNATURE:  {Esignature:163632538}    CASE MANAGEMENT/SOCIAL WORK SECTION    Inpatient Status Date: ***    Readmission Risk Assessment Score:  Readmission Risk              Risk of Unplanned Readmission:  0           Discharging to Facility/ Agency   Name:   Address:  Phone:  Fax:    Dialysis Facility (if applicable)   Name:  Address:  Dialysis Schedule:  Phone:  Fax:    / signature: {Esignature:279210920}    PHYSICIAN SECTION    Prognosis: {Prognosis:8147003340}    Condition at Discharge: [address] [place] [address] Patient Condition:359623972}    Rehab Potential (if transferring to Rehab): {Prognosis:7873747013}    Recommended Labs or Other Treatments After Discharge: ***    Physician Certification: I certify the above information and transfer of Judy Ortega  is necessary for the continuing treatment of the diagnosis listed and that he requires {Admit to Appropriate Level of Care:73735} for {GREATER/LESS:934384490} 30 days.      Update Admission H&P: {CHP DME Changes in CETAB:629929478}    PHYSICIAN SIGNATURE:  {Esignature:354885380} no

## 2022-04-21 NOTE — PROGRESS NOTE ADULT - SUBJECTIVE AND OBJECTIVE BOX
Patient is a 81y old  Male who presents with a chief complaint of SOB, pneumothorax (2022 12:30)    SUBJECTIVE / OVERNIGHT EVENTS: Patient seen and examined. Reports no SOB or chest pain. Says breathing is ok. No dysuria or abdominal pain. States that he walks without assist device but has been feeling weaker recently and using wheelchair. Confirmed DNR/DNI status.     MEDICATIONS  (STANDING):  allopurinol 50 milliGRAM(s) Oral daily  apixaban 5 milliGRAM(s) Oral every 12 hours  budesonide  80 MICROgram(s)/formoterol 4.5 MICROgram(s) Inhaler 2 Puff(s) Inhalation two times a day  cefTRIAXone   IVPB 1000 milliGRAM(s) IV Intermittent every 24 hours  lactated ringers. 1000 milliLiter(s) (75 mL/Hr) IV Continuous <Continuous>  levothyroxine 25 MICROGram(s) Oral daily  tiotropium 18 MICROgram(s) Capsule 1 Capsule(s) Inhalation daily    MEDICATIONS  (PRN):      Vital Signs Last 24 Hrs  T(C): 36.3 (2022 10:02), Max: 37.1 (2022 01:18)  T(F): 97.4 (2022 10:02), Max: 98.8 (2022 01:18)  HR: 84 (2022 10:02) (83 - 92)  BP: 122/74 (2022 10:02) (111/48 - 130/73)  BP(mean): --  RR: 18 (2022 10:02) (16 - 21)  SpO2: 100% (2022 10:02) (97% - 100%)      PHYSICAL EXAM:  GENERAL: NAD, sleeping comfortably   HEAD:  Atraumatic, Normocephalic  EYES: EOMI, PERRLA, conjunctiva and sclera clear  NECK: Supple, No JVD  CHEST/LUNG: decreased BS on left  HEART: Regular rate and rhythm; No murmurs, rubs, or gallops  ABDOMEN: Soft, Nontender, Nondistended; Bowel sounds present  EXTREMITIES:  2+ Peripheral Pulses, No clubbing, cyanosis, or edema  PSYCH: AAOx3  NEUROLOGY: non-focal  SKIN: No rashes or lesions    LABS:                        10.2   7.89  )-----------( 331      ( 2022 11:31 )             33.0     -    144  |  110<H>  |  28<H>  ----------------------------<  130<H>  4.4   |  23  |  2.30<H>    Ca    8.9      2022 11:31  Phos  3.7     -  Mg     1.80         TPro  7.6  /  Alb  2.7<L>  /  TBili  0.4  /  DBili  x   /  AST  18  /  ALT  8   /  AlkPhos  63  -    PT/INR - ( 2022 17:53 )   PT: 17.0 sec;   INR: 1.46 ratio         PTT - ( 2022 17:53 )  PTT:32.3 sec      Urinalysis Basic - ( 2022 00:54 )    Color: Light Orange / Appearance: Turbid / S.006 / pH: x  Gluc: x / Ketone: Negative  / Bili: Negative / Urobili: <2 mg/dL   Blood: x / Protein: 100 mg/dL / Nitrite: Negative   Leuk Esterase: Large / RBC: 38 /HPF / WBC >720 /HPF   Sq Epi: x / Non Sq Epi: 1 /HPF / Bacteria: Few        RADIOLOGY & ADDITIONAL TESTS: < from: Xray Chest 1 View- PORTABLE-Routine (Xray Chest 1 View- PORTABLE-Routine in AM.) (22 @ 03:25) >  IMPRESSION:    Moderate size left pneumothorax, not significantly changed on the most   recent image.    Diffuse increased interstitial opacities again noted.    Bilateral nodular opacities again seen with a peripheral right upper lobe   opacity increased in size and possible new nodular opacities in the   medial right lower lung and left midlung. A follow-up CT scan of the   chest could be performed for more definitive comparison, if felt to be   clinically indicated.    < end of copied text >      Imaging Personally Reviewed:    Consultant(s) Notes Reviewed:  heme/onc    Care Discussed with Consultants/Other Providers:    Assessment and Plan:

## 2022-04-21 NOTE — PROGRESS NOTE ADULT - PROBLEM SELECTOR PLAN 4
Noted to have sacral decubitus ulcer. Seeing wound care outpatient. Was recently debrided 4/19.  - Frequent turning  - Continue wound care and dressing changes Former smoker. On 3L O2 at home, but used more frequently in past week. No significant wheezing on exam. Suspect dyspnea from pneumothorax rather than COPD exacerbation  - continue home COPD meds

## 2022-04-21 NOTE — PROGRESS NOTE ADULT - PROBLEM SELECTOR PLAN 7
DVT ppx: on eliquis   Diet: Regular   Dispo: Pending improvement, PT eval  Code: DNR/DNI  HCP: Sotero Gomez (wife) Currently receiving immunotherapy with Keytruda. Last session earlier today 4/20.   - Suspect bladder cancer contributing to urinary incontinence.   - Monitor for urinary retention  - Likely no other plans during this admission.  -check renal US  --Per wife patient had ureteral stent placed in feb 2022 by Dr. Tirado at [facility] ( [phone])

## 2022-04-21 NOTE — PROGRESS NOTE ADULT - PROBLEM SELECTOR PLAN 6
Currently receiving immunotherapy with Keytruda. Last session earlier today 4/20.   - Suspect bladder cancer contributing to urinary incontinence.   - Monitor for urinary retention  - Likely no other plans during this admission.  -check renal US Recent RUE DVT noted on 4/13. Started on Eliquis.  - Continue Eliquis 5mg BID

## 2022-04-21 NOTE — CONSULT NOTE ADULT - NS ATTEND AMEND GEN_ALL_CORE FT
patient with left pneumothorax, small. recommend repeat cxr in PM and AM. if no worse would manage conservatively
82 y/o male with history of bladder cancer, on pembrolizumab, presented with dyspnea and pneumothorax.     - Monitor pneumothorax. Thoracic surgery following. Wean oxygen as tolerated.  - Acute kidney injury noted. Creatinine 1.35 on 02/24/22, 1.90 on 03/08/22, 2.05 on 3/30/22, and 2.53 on 4/6/22. Pending urine studies and renal ultrasound.  - Continue apixaban for VTE.    Will continue to follow.    Leonel Rucker MD  Hematology/Oncology  O: [phone]

## 2022-04-21 NOTE — PROGRESS NOTE ADULT - PROBLEM SELECTOR PLAN 8
DVT ppx: on eliquis   Diet: Regular   Dispo: Pending improvement, PT eval  Code: DNR/DNI  HCP: Sotero Welfare (wife)    Plan discussed with wife Sotero today.

## 2022-04-22 LAB
ANION GAP SERPL CALC-SCNC: 9 MMOL/L — SIGNIFICANT CHANGE UP (ref 7–14)
BUN SERPL-MCNC: 28 MG/DL — HIGH (ref 7–23)
CALCIUM SERPL-MCNC: 8.7 MG/DL — SIGNIFICANT CHANGE UP (ref 8.4–10.5)
CHLORIDE SERPL-SCNC: 109 MMOL/L — HIGH (ref 98–107)
CO2 SERPL-SCNC: 25 MMOL/L — SIGNIFICANT CHANGE UP (ref 22–31)
CREAT SERPL-MCNC: 2.48 MG/DL — HIGH (ref 0.5–1.3)
CULTURE RESULTS: SIGNIFICANT CHANGE UP
EGFR: 26 ML/MIN/1.73M2 — LOW
GLUCOSE SERPL-MCNC: 71 MG/DL — SIGNIFICANT CHANGE UP (ref 70–99)
HCT VFR BLD CALC: 29.4 % — LOW (ref 39–50)
HGB BLD-MCNC: 9.1 G/DL — LOW (ref 13–17)
MAGNESIUM SERPL-MCNC: 1.7 MG/DL — SIGNIFICANT CHANGE UP (ref 1.6–2.6)
MCHC RBC-ENTMCNC: 29.1 PG — SIGNIFICANT CHANGE UP (ref 27–34)
MCHC RBC-ENTMCNC: 31 GM/DL — LOW (ref 32–36)
MCV RBC AUTO: 93.9 FL — SIGNIFICANT CHANGE UP (ref 80–100)
NRBC # BLD: 0 /100 WBCS — SIGNIFICANT CHANGE UP
NRBC # FLD: 0 K/UL — SIGNIFICANT CHANGE UP
PHOSPHATE SERPL-MCNC: 3.7 MG/DL — SIGNIFICANT CHANGE UP (ref 2.5–4.5)
PLATELET # BLD AUTO: 323 K/UL — SIGNIFICANT CHANGE UP (ref 150–400)
POTASSIUM SERPL-MCNC: 4.5 MMOL/L — SIGNIFICANT CHANGE UP (ref 3.5–5.3)
POTASSIUM SERPL-SCNC: 4.5 MMOL/L — SIGNIFICANT CHANGE UP (ref 3.5–5.3)
RBC # BLD: 3.13 M/UL — LOW (ref 4.2–5.8)
RBC # FLD: 20.9 % — HIGH (ref 10.3–14.5)
SODIUM SERPL-SCNC: 143 MMOL/L — SIGNIFICANT CHANGE UP (ref 135–145)
SPECIMEN SOURCE: SIGNIFICANT CHANGE UP
WBC # BLD: 9.27 K/UL — SIGNIFICANT CHANGE UP (ref 3.8–10.5)
WBC # FLD AUTO: 9.27 K/UL — SIGNIFICANT CHANGE UP (ref 3.8–10.5)

## 2022-04-22 PROCEDURE — 71045 X-RAY EXAM CHEST 1 VIEW: CPT | Mod: 26,76

## 2022-04-22 PROCEDURE — 99233 SBSQ HOSP IP/OBS HIGH 50: CPT

## 2022-04-22 PROCEDURE — 99232 SBSQ HOSP IP/OBS MODERATE 35: CPT | Mod: 57

## 2022-04-22 RX ADMIN — BUDESONIDE AND FORMOTEROL FUMARATE DIHYDRATE 2 PUFF(S): 160; 4.5 AEROSOL RESPIRATORY (INHALATION) at 21:52

## 2022-04-22 RX ADMIN — Medication 25 MICROGRAM(S): at 05:46

## 2022-04-22 RX ADMIN — APIXABAN 5 MILLIGRAM(S): 2.5 TABLET, FILM COATED ORAL at 05:46

## 2022-04-22 RX ADMIN — APIXABAN 5 MILLIGRAM(S): 2.5 TABLET, FILM COATED ORAL at 17:58

## 2022-04-22 RX ADMIN — CEFTRIAXONE 100 MILLIGRAM(S): 500 INJECTION, POWDER, FOR SOLUTION INTRAMUSCULAR; INTRAVENOUS at 10:35

## 2022-04-22 RX ADMIN — TIOTROPIUM BROMIDE 1 CAPSULE(S): 18 CAPSULE ORAL; RESPIRATORY (INHALATION) at 10:34

## 2022-04-22 RX ADMIN — BUDESONIDE AND FORMOTEROL FUMARATE DIHYDRATE 2 PUFF(S): 160; 4.5 AEROSOL RESPIRATORY (INHALATION) at 10:33

## 2022-04-22 RX ADMIN — Medication 50 MILLIGRAM(S): at 10:33

## 2022-04-22 NOTE — PROGRESS NOTE ADULT - SUBJECTIVE AND OBJECTIVE BOX
pt seen and examined with Dr Rivera  He is comfortable in bed; 98% on 3L nasal cannula  no acute distress    Vital Signs Last 24 Hrs  T(C): 36.4 (22 Apr 2022 05:00), Max: 36.7 (21 Apr 2022 21:00)  T(F): 97.6 (22 Apr 2022 05:00), Max: 98.1 (21 Apr 2022 21:00)  HR: 84 (22 Apr 2022 05:00) (84 - 97)  BP: 128/75 (22 Apr 2022 05:00) (127/77 - 141/81)  BP(mean): --  RR: 18 (22 Apr 2022 05:00) (18 - 18)  SpO2: 98% (22 Apr 2022 05:00) (97% - 98%)    PE  General: WN/WD NAD  Neurology: Awake, nonfocal, GALLEGOS x 4  Eyes: Scleras clear, PERRLA/ EOMI, Gross vision intact  ENT:Gross hearing intact, grossly patent pharynx, no stridor  Neck: Neck supple, trachea midline, No JVD,   Respiratory: decreased left side,  No wheezing, rales, rhonchi  CV: RRR, S1S2, no murmurs, rubs or gallops  Abdominal: Soft, NT, ND +BS,   Extremities: No edema, + peripheral pulses  Skin: No Rashes, Hematoma, Ecchymosis  Lymphatic: No Neck, axilla, groin LAD  Psych: Oriented x 3, normal affect      Repeat CXRs have been stable without change in the L PTX.  There is no plan for any intervention at this time     A/P 81y with COPD and uses 4L nasal cannula, pulm HTN, L lung nodule, bladder CA s/p chemo and radiation  - case reviewed again with Dr Rivera  - no thoracic intervention at this time  - please reconsult with concerns    Juany MORA [id number]

## 2022-04-22 NOTE — PROGRESS NOTE ADULT - PROBLEM SELECTOR PLAN 7
Currently receiving immunotherapy with Keytruda. Last session earlier today 4/20.   - Suspect bladder cancer contributing to urinary incontinence.   - Monitor for urinary retention  - Likely no other plans during this admission.  -Per wife patient had ureteral stent placed in feb 2022 by Dr. Tirado at [facility] ( [phone])

## 2022-04-22 NOTE — PROGRESS NOTE ADULT - SUBJECTIVE AND OBJECTIVE BOX
Repeat CXRs have been stable without change in the L PTX.  There is no plan for any intervention at this time but thoracic surgery will continue to follow.

## 2022-04-22 NOTE — PROGRESS NOTE ADULT - ASSESSMENT
80 y/o male, former smoker (2 PPW x40yrs, quit 12/2021), hx of COPD (on home O2 @ 3L NC), Pneumothorax (most recently 1/15/22), Vit D deficiency, HTN, HLD, Bladder Ca with pulmonary mets s/p chemo (6 cycles of carboplatin + gemcitabine - completed 12/2021, now on Keytruda started Feb 2022, every 3 weeks, s/p 3 treatments now), moderate pHTN, sacral decubitus ulcer, RUE DVT ddx 4/13/22 on eliquis, recent admission for large bowel obstruction s/p transverse loop colostomy in March 2022. Pt presenting with increasing SOB, found to have L pneumothorax.

## 2022-04-22 NOTE — PROGRESS NOTE ADULT - PROBLEM SELECTOR PLAN 8
DVT ppx: on eliquis   Diet: Regular   Dispo: Pending improvement, PT eval  Code: DNR/DNI  HCP: [employer] (wife)    Plan discussed with wife [employer] on 4/21 DVT ppx: on eliquis   Diet: Regular   Dispo: Pending improvement, PT eval  Code: DNR/DNI  HCP: [employer] (wife)    Plan discussed with wife [employer] on 4/22 over the phone. DVT ppx: on eliquis   Diet: Regular   Dispo: Pending improvement, PT eval  Code: DNR/DNI  HCP: [employer] (wife)    Plan discussed with wife [employer] on 4/22 at bedside

## 2022-04-22 NOTE — PROGRESS NOTE ADULT - PROBLEM SELECTOR PLAN 2
-patient with positive UA in setting of BRISA and bladder cancer  -patient denies any dysuria or abdominal pain  -per wife has chronic UTIs and has ureteral stents  -would treat for now with ceftriaxone and follow up cultures

## 2022-04-22 NOTE — PROGRESS NOTE ADULT - SUBJECTIVE AND OBJECTIVE BOX
Patient is a 81y old  Male who presents with a chief complaint of SOB, pneumothorax (2022 12:30)    SUBJECTIVE / OVERNIGHT EVENTS: Patient seen and examined. Reports no SOB or chest pain. Says breathing is ok. No abdominal pain. Understands that he needs to stay to get BRISA workup. Plan discussed with him re: need for ct abd/pel.    MEDICATIONS  (STANDING):  allopurinol 50 milliGRAM(s) Oral daily  apixaban 5 milliGRAM(s) Oral every 12 hours  budesonide  80 MICROgram(s)/formoterol 4.5 MICROgram(s) Inhaler 2 Puff(s) Inhalation two times a day  cefTRIAXone   IVPB 1000 milliGRAM(s) IV Intermittent every 24 hours  lactated ringers. 1000 milliLiter(s) (75 mL/Hr) IV Continuous <Continuous>  levothyroxine 25 MICROGram(s) Oral daily  tiotropium 18 MICROgram(s) Capsule 1 Capsule(s) Inhalation daily    MEDICATIONS  (PRN):    Vital Signs Last 24 Hrs  T(C): 36.4 (2022 05:00), Max: 36.7 (2022 21:00)  T(F): 97.6 (2022 05:00), Max: 98.1 (2022 21:00)  HR: 84 (2022 05:00) (84 - 97)  BP: 128/75 (2022 05:00) (127/77 - 141/81)  BP(mean): --  RR: 18 (2022 05:00) (18 - 18)  SpO2: 98% (2022 05:00) (97% - 98%)      PHYSICAL EXAM:  GENERAL: NAD, sleeping comfortably   HEAD:  Atraumatic, Normocephalic  EYES: EOMI, PERRLA, conjunctiva and sclera clear  NECK: Supple, No JVD  CHEST/LUNG: decreased BS on left  HEART: Regular rate and rhythm; No murmurs, rubs, or gallops  ABDOMEN: Soft, + ostomy  EXTREMITIES:  2+ Peripheral Pulses, No clubbing, cyanosis, or edema  PSYCH: AAOx3  NEUROLOGY: non-focal  SKIN: No rashes or lesions    LABS:                                   9.1    9.27  )-----------( 323      ( 2022 05:17 )             29.4   04-22    143  |  109<H>  |  28<H>  ----------------------------<  71  4.5   |  25  |  2.48<H>    Ca    8.7      2022 05:17  Phos  3.7       Mg     1.70         TPro  7.6  /  Alb  2.7<L>  /  TBili  0.4  /  DBili  x   /  AST  18  /  ALT  8   /  AlkPhos  63          Urinalysis Basic - ( 2022 00:54 )    Color: Light Orange / Appearance: Turbid / S.006 / pH: x  Gluc: x / Ketone: Negative  / Bili: Negative / Urobili: <2 mg/dL   Blood: x / Protein: 100 mg/dL / Nitrite: Negative   Leuk Esterase: Large / RBC: 38 /HPF / WBC >720 /HPF   Sq Epi: x / Non Sq Epi: 1 /HPF / Bacteria: Few        RADIOLOGY & ADDITIONAL TESTS: < from: Xray Chest 1 View- PORTABLE-Routine (Xray Chest 1 View- PORTABLE-Routine in AM.) (22 @ 03:25) >  IMPRESSION:    Moderate size left pneumothorax, not significantly changed on the most   recent image.    Diffuse increased interstitial opacities again noted.    Bilateral nodular opacities again seen with a peripheral right upper lobe   opacity increased in size and possible new nodular opacities in the   medial right lower lung and left midlung. A follow-up CT scan of the   chest could be performed for more definitive comparison, if felt to be   clinically indicated.    < end of copied text >      Imaging Personally Reviewed:    Consultant(s) Notes Reviewed:  heme/onc    Care Discussed with Consultants/Other Providers: renal - Dr. Jameson    Assessment and Plan:
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
## 2022-04-22 NOTE — PROGRESS NOTE ADULT - PROBLEM SELECTOR PLAN 3
Noted to have BRISA. Baseline Scr 1.2 in March 2022. May be 2/2 recent immunotherapy with Keytruda or pre-renal.   -- Renal US with chronic hydro? Stents in place  - bladder scan prn if not urinating  - Strict I/Os and avoid nephrotoxic meds   - Monitor BMP and SCr  - Stop IVF as no improvement with IVF, pt has good PO intake  - curbsided and at this time no concern for stent infection and any intervention from their perspective unless he has decreased output  -CT abd/pel to further evaluate

## 2022-04-23 LAB
ANION GAP SERPL CALC-SCNC: 12 MMOL/L — SIGNIFICANT CHANGE UP (ref 7–14)
BUN SERPL-MCNC: 28 MG/DL — HIGH (ref 7–23)
CALCIUM SERPL-MCNC: 8.6 MG/DL — SIGNIFICANT CHANGE UP (ref 8.4–10.5)
CHLORIDE SERPL-SCNC: 108 MMOL/L — HIGH (ref 98–107)
CO2 SERPL-SCNC: 23 MMOL/L — SIGNIFICANT CHANGE UP (ref 22–31)
CREAT SERPL-MCNC: 2.56 MG/DL — HIGH (ref 0.5–1.3)
EGFR: 25 ML/MIN/1.73M2 — LOW
GLUCOSE SERPL-MCNC: 71 MG/DL — SIGNIFICANT CHANGE UP (ref 70–99)
HCT VFR BLD CALC: 27.1 % — LOW (ref 39–50)
HGB BLD-MCNC: 8.3 G/DL — LOW (ref 13–17)
MAGNESIUM SERPL-MCNC: 1.7 MG/DL — SIGNIFICANT CHANGE UP (ref 1.6–2.6)
MCHC RBC-ENTMCNC: 28.6 PG — SIGNIFICANT CHANGE UP (ref 27–34)
MCHC RBC-ENTMCNC: 30.6 GM/DL — LOW (ref 32–36)
MCV RBC AUTO: 93.4 FL — SIGNIFICANT CHANGE UP (ref 80–100)
NRBC # BLD: 0 /100 WBCS — SIGNIFICANT CHANGE UP
NRBC # FLD: 0 K/UL — SIGNIFICANT CHANGE UP
PHOSPHATE SERPL-MCNC: 3.4 MG/DL — SIGNIFICANT CHANGE UP (ref 2.5–4.5)
PLATELET # BLD AUTO: 308 K/UL — SIGNIFICANT CHANGE UP (ref 150–400)
POTASSIUM SERPL-MCNC: 4 MMOL/L — SIGNIFICANT CHANGE UP (ref 3.5–5.3)
POTASSIUM SERPL-SCNC: 4 MMOL/L — SIGNIFICANT CHANGE UP (ref 3.5–5.3)
RBC # BLD: 2.9 M/UL — LOW (ref 4.2–5.8)
RBC # FLD: 20.9 % — HIGH (ref 10.3–14.5)
SODIUM SERPL-SCNC: 143 MMOL/L — SIGNIFICANT CHANGE UP (ref 135–145)
WBC # BLD: 9.07 K/UL — SIGNIFICANT CHANGE UP (ref 3.8–10.5)
WBC # FLD AUTO: 9.07 K/UL — SIGNIFICANT CHANGE UP (ref 3.8–10.5)

## 2022-04-23 PROCEDURE — 99233 SBSQ HOSP IP/OBS HIGH 50: CPT

## 2022-04-23 RX ADMIN — APIXABAN 5 MILLIGRAM(S): 2.5 TABLET, FILM COATED ORAL at 05:54

## 2022-04-23 RX ADMIN — Medication 25 MICROGRAM(S): at 05:54

## 2022-04-23 RX ADMIN — TIOTROPIUM BROMIDE 1 CAPSULE(S): 18 CAPSULE ORAL; RESPIRATORY (INHALATION) at 10:35

## 2022-04-23 RX ADMIN — BUDESONIDE AND FORMOTEROL FUMARATE DIHYDRATE 2 PUFF(S): 160; 4.5 AEROSOL RESPIRATORY (INHALATION) at 21:44

## 2022-04-23 RX ADMIN — BUDESONIDE AND FORMOTEROL FUMARATE DIHYDRATE 2 PUFF(S): 160; 4.5 AEROSOL RESPIRATORY (INHALATION) at 09:22

## 2022-04-23 RX ADMIN — CEFTRIAXONE 100 MILLIGRAM(S): 500 INJECTION, POWDER, FOR SOLUTION INTRAMUSCULAR; INTRAVENOUS at 10:28

## 2022-04-23 RX ADMIN — Medication 50 MILLIGRAM(S): at 11:10

## 2022-04-23 RX ADMIN — APIXABAN 5 MILLIGRAM(S): 2.5 TABLET, FILM COATED ORAL at 17:59

## 2022-04-23 NOTE — PROGRESS NOTE ADULT - SUBJECTIVE AND OBJECTIVE BOX
Patient seen and examined in bed.  No new events overnight.    REVIEW OF SYSTEMS:  As per HPI, otherwise 8 full 10 ROS were unremarkable    MEDICATIONS  (STANDING):  allopurinol 50 milliGRAM(s) Oral daily  apixaban 5 milliGRAM(s) Oral every 12 hours  budesonide  80 MICROgram(s)/formoterol 4.5 MICROgram(s) Inhaler 2 Puff(s) Inhalation two times a day  cefTRIAXone   IVPB 1000 milliGRAM(s) IV Intermittent every 24 hours  levothyroxine 25 MICROGram(s) Oral daily  tiotropium 18 MICROgram(s) Capsule 1 Capsule(s) Inhalation daily      VITAL:  T(C): , Max: 37.1 (04-22-22 @ 21:00)  T(F): , Max: 98.7 (04-22-22 @ 21:00)  HR: 91 (04-23-22 @ 05:50)  BP: 135/81 (04-23-22 @ 05:50)  BP(mean): --  RR: 18 (04-23-22 @ 05:50)  SpO2: 97% (04-23-22 @ 05:50)  Wt(kg): --    I and O's:    04-22 @ 07:01  -  04-23 @ 07:00  --------------------------------------------------------  IN: 986 mL / OUT: 2820 mL / NET: -1834 mL          PHYSICAL EXAM:    Constitutional: NAD  HEENT: PERRLA, EOMI,  MMM  Neck: No LAD, No JVD  Respiratory: CTAB  Cardiovascular: S1 and S2  Gastrointestinal: BS+, +ostomy   Extremities: No peripheral edema  Neurological: A/O x 3, no focal deficits  Psychiatric: Normal mood, normal affect  : no Roberts  Skin: No rashes  Access: Not applicable    LABS:                        8.3    9.07  )-----------( 308      ( 23 Apr 2022 05:52 )             27.1     04-23    143  |  108<H>  |  28<H>  ----------------------------<  71  4.0   |  23  |  2.56<H>    Ca    8.6      23 Apr 2022 05:52  Phos  3.4     04-23  Mg     1.70     04-23    TPro  7.6  /  Alb  2.7<L>  /  TBili  0.4  /  DBili  x   /  AST  18  /  ALT  8   /  AlkPhos  63  04-21      ASSESSMENT:  Mr. Gomez is an 81 year-old man with history of multiple medical issues including hypertension, COPD, pneumothoraces, bladder cancer with pulmonary mets on chemo, RUE DVT  and large bowel obstruction.  Patient presented with worsening SOB and BRISA.    (1)BRISA - is the BRISA due to obstruction? Whereas bilateral hydronephrosis is appreciated on US further imaging is warranted to evaluate if stents are functioning appropriately at present.  Azotemia slightly higher this am.  Good urine output noted.  Remains on IV abx    (2)Lytes - acceptable    (3)Pulm - intermittent hypoxia - fluid overload? CT of chest pending.  02 sat >97% on NC; no intervention warranted as per CTS.    RECOMMEND:  (1)CT abdomen/pelvis without IV contrast; could perform CT chest at same time  (2)Urine: lytes, creatinine (ordered)  (3)BMP daily  (4)No IVF for now; determination of whether to give IV Lasix dependent on results of chest imaging (CXR +/- noncontrast CT)  (5)Favor no CTPA; would opt for VQ if needing workup to rule out PE if warranted (changes in respiratory status)  (6)Dose new meds for GFR 20-30ml/min (present dosing is acceptable)  (7)Monitor I & Os

## 2022-04-23 NOTE — PROGRESS NOTE ADULT - PROBLEM SELECTOR PLAN 8
DVT ppx: on eliquis   Diet: Regular   Dispo: Pending improvement, PT eval  Code: DNR/DNI  HCP: [employer] (wife)    Plan discussed with wife [employer]ot[employer] on 4/23

## 2022-04-23 NOTE — DIETITIAN INITIAL EVALUATION ADULT - OTHER INFO
RD visited with patient for requested consult.  Admitted for pneumothorax. Patient eating lunch at time of visit. Endorses a good appetite at this time.  Patient denies any chewing/swallowing difficulties; no GI distress i.e. nausea, vomiting, diarrhea.  No food allergies reported.  Patient noted with stage III pressure injury to sacrum.  Offered oral supplementation i.e. Ensure shakes for which patient was amenable to.  Food preferences taken.  Patient unsure of usual body weight.

## 2022-04-23 NOTE — PROGRESS NOTE ADULT - PROBLEM SELECTOR PLAN 2
-patient with positive UA in setting of BRISA and bladder cancer  -patient denies any dysuria or abdominal pain  -per wife has chronic UTIs and has ureteral stents  -urine cx contaminated  -plan for 5 days of ceftriaxone d3/5 today

## 2022-04-23 NOTE — PROGRESS NOTE ADULT - PROBLEM SELECTOR PLAN 7
Currently receiving immunotherapy with Keytruda. Last session earlier today 4/20.   - Suspect bladder cancer contributing to urinary incontinence.   - Monitor for urinary retention  - Likely no other plans during this admission.  -Per wife patient had ureteral stent placed in feb 2022 by Dr. Tirado at [facility] ( [phone])

## 2022-04-23 NOTE — DIETITIAN INITIAL EVALUATION ADULT - REASON FOR ADMISSION
Physical Therapy Discharge Summary Addendum:  Date: 4/11/2022  Total Number of Visits: 4  Referred by: Jose Espino MD  Medical Diagnosis (from order):   Diagnosis Information      Diagnosis    729.5 (ICD-9-CM) - M79.606 (ICD-10-CM) - Pain of lower extremity, unspecified laterality    729.1 (ICD-9-CM) - M79.18 (ICD-10-CM) - Buttock pain                Patient responded via patient online portal that symptoms resolved, canceled remaining visits.  Status of goals: based on last known status anticipate goals met       Outcome Measures:   OSWESTRY Total Scored: 2  OSWESTRY Total Possible Score: 45  OSWESTRY Score Calculated: 4.44 %    (0-20% = minimal disability; 20-40% = moderate disability; 40-60% = severe disability; 60-80% = crippled; % = bed bound) see flowsheet for additional documentation       Pneumothorax

## 2022-04-23 NOTE — DIETITIAN INITIAL EVALUATION ADULT - PERTINENT LABORATORY DATA
04-23    143  |  108<H>  |  28<H>  ----------------------------<  71  4.0   |  23  |  2.56<H>    Ca    8.6      23 Apr 2022 05:52  Phos  3.4     04-23  Mg     1.70     04-23

## 2022-04-23 NOTE — PROGRESS NOTE ADULT - SUBJECTIVE AND OBJECTIVE BOX
Patient is a 81y old  Male who presents with a chief complaint of SOB, pneumothorax (2022 12:30)    SUBJECTIVE / OVERNIGHT EVENTS: Patient seen and examined. Reports no SOB or chest pain. Says breathing is ok. No abdominal pain. Understands that he needs to stay to get BRISA workup. Plan discussed with him re: need for ct abd/pel. satting well on 3L NC    MEDICATIONS  (STANDING):  allopurinol 50 milliGRAM(s) Oral daily  apixaban 5 milliGRAM(s) Oral every 12 hours  budesonide  80 MICROgram(s)/formoterol 4.5 MICROgram(s) Inhaler 2 Puff(s) Inhalation two times a day  cefTRIAXone   IVPB 1000 milliGRAM(s) IV Intermittent every 24 hours  lactated ringers. 1000 milliLiter(s) (75 mL/Hr) IV Continuous <Continuous>  levothyroxine 25 MICROGram(s) Oral daily  tiotropium 18 MICROgram(s) Capsule 1 Capsule(s) Inhalation daily    MEDICATIONS  (PRN):    Vital Signs Last 24 Hrs  T(C): 36.7 (2022 05:50), Max: 37.1 (2022 21:00)  T(F): 98 (2022 05:50), Max: 98.7 (2022 21:00)  HR: 91 (2022 05:50) (91 - 95)  BP: 135/81 (2022 05:50) (108/65 - 135/81)  BP(mean): --  RR: 18 (2022 05:50) (18 - 19)  SpO2: 97% (2022 05:50) (97% - 99%)    PHYSICAL EXAM:  GENERAL: NAD, sleeping comfortably   HEAD:  Atraumatic, Normocephalic  EYES: EOMI, PERRLA, conjunctiva and sclera clear  NECK: Supple, No JVD  CHEST/LUNG: decreased BS on left  HEART: Regular rate and rhythm; No murmurs, rubs, or gallops  ABDOMEN: Soft, + ostomy  EXTREMITIES:  2+ Peripheral Pulses, No clubbing, cyanosis, or edema  PSYCH: AAOx3  NEUROLOGY: non-focal  SKIN: No rashes or lesions    LABS:                                              8.3    9.07  )-----------( 308      ( 2022 05:52 )             27.1   04-    143  |  108<H>  |  28<H>  ----------------------------<  71  4.0   |  23  |  2.56<H>    Ca    8.6      2022 05:52  Phos  3.4       Mg     1.70                 Urinalysis Basic - ( 2022 00:54 )    Color: Light Orange / Appearance: Turbid / S.006 / pH: x  Gluc: x / Ketone: Negative  / Bili: Negative / Urobili: <2 mg/dL   Blood: x / Protein: 100 mg/dL / Nitrite: Negative   Leuk Esterase: Large / RBC: 38 /HPF / WBC >720 /HPF   Sq Epi: x / Non Sq Epi: 1 /HPF / Bacteria: Few        RADIOLOGY & ADDITIONAL TESTS: < from: Xray Chest 1 View- PORTABLE-Routine (Xray Chest 1 View- PORTABLE-Routine in AM.) (22 @ 03:25) >  IMPRESSION:    Moderate size left pneumothorax, not significantly changed on the most   recent image.    Diffuse increased interstitial opacities again noted.    Bilateral nodular opacities again seen with a peripheral right upper lobe   opacity increased in size and possible new nodular opacities in the   medial right lower lung and left midlung. A follow-up CT scan of the   chest could be performed for more definitive comparison, if felt to be   clinically indicated.    < end of copied text >      Imaging Personally Reviewed:    Consultant(s) Notes Reviewed:  heme/onc    Care Discussed with Consultants/Other Providers: renal - Dr. Jameson    Assessment and Plan:

## 2022-04-23 NOTE — PHYSICAL THERAPY INITIAL EVALUATION ADULT - PLANNED THERAPY INTERVENTIONS, PT EVAL
Patient left positioned for safety in bed in NAD, call bell in reach, all lines intact./balance training/bed mobility training/gait training/strengthening/transfer training

## 2022-04-23 NOTE — PHYSICAL THERAPY INITIAL EVALUATION ADULT - PERTINENT HX OF CURRENT PROBLEM, REHAB EVAL
81 year old male, former smoker, hx of COPD (on home O2 @ 3L NC), Pneumothorax, Vit D deficiency, HTN, HLD, Bladder Ca with pulmonary mets s/p chemo. moderate pHTN, sacral decubitus ulcer, RUE DVT ddx 4/13/22 on eliquis, recent admission for large bowel obstruction s/p transverse loop colostomy in March 2022. Pt presenting with increased SOB.  Patient was having increasing O2 need at home so outpatient provider ordered CXR for him. Found to have a Left Peumothorax.

## 2022-04-23 NOTE — PHYSICAL THERAPY INITIAL EVALUATION ADULT - ADDITIONAL COMMENTS
patient uses home 02 at 3L/min via NC; patient has 3 steps with bilateral railings to enter and then can remain on first floor

## 2022-04-23 NOTE — DIETITIAN INITIAL EVALUATION ADULT - PERTINENT MEDS FT
MEDICATIONS  (STANDING):  allopurinol 50 milliGRAM(s) Oral daily  apixaban 5 milliGRAM(s) Oral every 12 hours  budesonide  80 MICROgram(s)/formoterol 4.5 MICROgram(s) Inhaler 2 Puff(s) Inhalation two times a day  cefTRIAXone   IVPB 1000 milliGRAM(s) IV Intermittent every 24 hours  levothyroxine 25 MICROGram(s) Oral daily  tiotropium 18 MICROgram(s) Capsule 1 Capsule(s) Inhalation daily    MEDICATIONS  (PRN):

## 2022-04-24 LAB
ANION GAP SERPL CALC-SCNC: 13 MMOL/L — SIGNIFICANT CHANGE UP (ref 7–14)
BUN SERPL-MCNC: 28 MG/DL — HIGH (ref 7–23)
CALCIUM SERPL-MCNC: 8.6 MG/DL — SIGNIFICANT CHANGE UP (ref 8.4–10.5)
CHLORIDE SERPL-SCNC: 109 MMOL/L — HIGH (ref 98–107)
CO2 SERPL-SCNC: 22 MMOL/L — SIGNIFICANT CHANGE UP (ref 22–31)
CREAT SERPL-MCNC: 2.7 MG/DL — HIGH (ref 0.5–1.3)
EGFR: 23 ML/MIN/1.73M2 — LOW
GLUCOSE SERPL-MCNC: 59 MG/DL — LOW (ref 70–99)
HCT VFR BLD CALC: 26.1 % — LOW (ref 39–50)
HCT VFR BLD CALC: 26.5 % — LOW (ref 39–50)
HGB BLD-MCNC: 8 G/DL — LOW (ref 13–17)
HGB BLD-MCNC: 8.3 G/DL — LOW (ref 13–17)
MAGNESIUM SERPL-MCNC: 1.7 MG/DL — SIGNIFICANT CHANGE UP (ref 1.6–2.6)
MCHC RBC-ENTMCNC: 29.1 PG — SIGNIFICANT CHANGE UP (ref 27–34)
MCHC RBC-ENTMCNC: 29.4 PG — SIGNIFICANT CHANGE UP (ref 27–34)
MCHC RBC-ENTMCNC: 30.7 GM/DL — LOW (ref 32–36)
MCHC RBC-ENTMCNC: 31.3 GM/DL — LOW (ref 32–36)
MCV RBC AUTO: 94 FL — SIGNIFICANT CHANGE UP (ref 80–100)
MCV RBC AUTO: 94.9 FL — SIGNIFICANT CHANGE UP (ref 80–100)
NRBC # BLD: 0 /100 WBCS — SIGNIFICANT CHANGE UP
NRBC # BLD: 0 /100 WBCS — SIGNIFICANT CHANGE UP
NRBC # FLD: 0 K/UL — SIGNIFICANT CHANGE UP
NRBC # FLD: 0 K/UL — SIGNIFICANT CHANGE UP
PHOSPHATE SERPL-MCNC: 3.3 MG/DL — SIGNIFICANT CHANGE UP (ref 2.5–4.5)
PLATELET # BLD AUTO: 275 K/UL — SIGNIFICANT CHANGE UP (ref 150–400)
PLATELET # BLD AUTO: 287 K/UL — SIGNIFICANT CHANGE UP (ref 150–400)
POTASSIUM SERPL-MCNC: 3.8 MMOL/L — SIGNIFICANT CHANGE UP (ref 3.5–5.3)
POTASSIUM SERPL-SCNC: 3.8 MMOL/L — SIGNIFICANT CHANGE UP (ref 3.5–5.3)
RBC # BLD: 2.75 M/UL — LOW (ref 4.2–5.8)
RBC # BLD: 2.82 M/UL — LOW (ref 4.2–5.8)
RBC # FLD: 21.1 % — HIGH (ref 10.3–14.5)
RBC # FLD: 21.2 % — HIGH (ref 10.3–14.5)
SODIUM SERPL-SCNC: 144 MMOL/L — SIGNIFICANT CHANGE UP (ref 135–145)
WBC # BLD: 10.01 K/UL — SIGNIFICANT CHANGE UP (ref 3.8–10.5)
WBC # BLD: 8.71 K/UL — SIGNIFICANT CHANGE UP (ref 3.8–10.5)
WBC # FLD AUTO: 10.01 K/UL — SIGNIFICANT CHANGE UP (ref 3.8–10.5)
WBC # FLD AUTO: 8.71 K/UL — SIGNIFICANT CHANGE UP (ref 3.8–10.5)

## 2022-04-24 PROCEDURE — 71250 CT THORAX DX C-: CPT | Mod: 26

## 2022-04-24 PROCEDURE — 99233 SBSQ HOSP IP/OBS HIGH 50: CPT

## 2022-04-24 PROCEDURE — 74176 CT ABD & PELVIS W/O CONTRAST: CPT | Mod: 26

## 2022-04-24 RX ORDER — HEPARIN SODIUM 5000 [USP'U]/ML
2500 INJECTION INTRAVENOUS; SUBCUTANEOUS EVERY 6 HOURS
Refills: 0 | Status: DISCONTINUED | OUTPATIENT
Start: 2022-04-24 | End: 2022-04-25

## 2022-04-24 RX ORDER — HEPARIN SODIUM 5000 [USP'U]/ML
INJECTION INTRAVENOUS; SUBCUTANEOUS
Qty: 25000 | Refills: 0 | Status: DISCONTINUED | OUTPATIENT
Start: 2022-04-24 | End: 2022-04-25

## 2022-04-24 RX ORDER — HEPARIN SODIUM 5000 [USP'U]/ML
5500 INJECTION INTRAVENOUS; SUBCUTANEOUS EVERY 6 HOURS
Refills: 0 | Status: DISCONTINUED | OUTPATIENT
Start: 2022-04-24 | End: 2022-04-25

## 2022-04-24 RX ADMIN — BUDESONIDE AND FORMOTEROL FUMARATE DIHYDRATE 2 PUFF(S): 160; 4.5 AEROSOL RESPIRATORY (INHALATION) at 10:04

## 2022-04-24 RX ADMIN — Medication 50 MILLIGRAM(S): at 11:21

## 2022-04-24 RX ADMIN — Medication 25 MICROGRAM(S): at 05:25

## 2022-04-24 RX ADMIN — HEPARIN SODIUM 1200 UNIT(S)/HR: 5000 INJECTION INTRAVENOUS; SUBCUTANEOUS at 19:39

## 2022-04-24 RX ADMIN — BUDESONIDE AND FORMOTEROL FUMARATE DIHYDRATE 2 PUFF(S): 160; 4.5 AEROSOL RESPIRATORY (INHALATION) at 22:06

## 2022-04-24 RX ADMIN — TIOTROPIUM BROMIDE 1 CAPSULE(S): 18 CAPSULE ORAL; RESPIRATORY (INHALATION) at 10:05

## 2022-04-24 RX ADMIN — CEFTRIAXONE 100 MILLIGRAM(S): 500 INJECTION, POWDER, FOR SOLUTION INTRAMUSCULAR; INTRAVENOUS at 11:19

## 2022-04-24 RX ADMIN — HEPARIN SODIUM 1200 UNIT(S)/HR: 5000 INJECTION INTRAVENOUS; SUBCUTANEOUS at 17:37

## 2022-04-24 RX ADMIN — APIXABAN 5 MILLIGRAM(S): 2.5 TABLET, FILM COATED ORAL at 05:25

## 2022-04-24 NOTE — PROGRESS NOTE ADULT - SUBJECTIVE AND OBJECTIVE BOX
Patient seen and examined in bed.  +hematuria today.    REVIEW OF SYSTEMS:  As per HPI, otherwise 8 full 10 ROS were unremarkable    MEDICATIONS  (STANDING):  allopurinol 50 milliGRAM(s) Oral daily  apixaban 5 milliGRAM(s) Oral every 12 hours  budesonide  80 MICROgram(s)/formoterol 4.5 MICROgram(s) Inhaler 2 Puff(s) Inhalation two times a day  cefTRIAXone   IVPB 1000 milliGRAM(s) IV Intermittent every 24 hours  levothyroxine 25 MICROGram(s) Oral daily  tiotropium 18 MICROgram(s) Capsule 1 Capsule(s) Inhalation daily      VITAL:  T(C): , Max: 36.9 (04-23-22 @ 20:55)  T(F): , Max: 98.4 (04-23-22 @ 20:55)  HR: 94 (04-24-22 @ 11:13)  BP: 130/67 (04-24-22 @ 11:13)  BP(mean): --  RR: 16 (04-24-22 @ 11:13)  SpO2: 97% (04-24-22 @ 11:13)  Wt(kg): --    I and O's:    04-23 @ 07:01  -  04-24 @ 07:00  --------------------------------------------------------  IN: 970 mL / OUT: 2150 mL / NET: -1180 mL          PHYSICAL EXAM:    Constitutional: NAD  HEENT: PERRLA, EOMI,  MMM  Neck: No LAD, No JVD  Respiratory: CTAB  Cardiovascular: S1 and S2  Gastrointestinal: BS+, soft, NT/ND  Extremities: No peripheral edema  Neurological: A/O x 3, no focal deficits  Psychiatric: Normal mood, normal affect  : +condom catheter (+hematuria)  Skin: No rashes  Access: Not applicable    LABS:                        8.3    8.71  )-----------( 287      ( 24 Apr 2022 06:57 )             26.5     04-24    144  |  109<H>  |  28<H>  ----------------------------<  59<L>  3.8   |  22  |  2.70<H>    Ca    8.6      24 Apr 2022 06:57  Phos  3.3     04-24  Mg     1.70     04-24      ASSESSMENT:  Mr. Gomez is an 81 year-old man with history of multiple medical issues including hypertension, COPD, pneumothoraces, bladder cancer with pulmonary mets on chemo, RUE DVT  and large bowel obstruction.  Patient presented with worsening SOB and BRISA.    (1)BRISA - BRISA likely dt obstruction.  Hydro b/l noted on ct scan.  Azotemia continues to trend upward.  Good urine output noted.  Remains on IV abx    (2)Lytes - acceptable    (3)Pulm - intermittent hypoxia - trace b/l pleural effusions noted.  02 sat >97% on NC; no intervention warranted as per CTS.    RECOMMEND:  (1)Appreciate urology recs; patient recommended for NT placement, primary team to coordinate with IR in am  (2)Urine: lytes, creatinine (pending)  (3)BMP daily  (4)No IVF for now  (5)Avoid contrast studies as able  (6)Dose new meds for GFR 20-30ml/min (present dosing is acceptable)  (7)Avoid nephrotoxins/NSAIDs as able  (8)Management of hematuria per primary/.  Continue to trend H/H

## 2022-04-24 NOTE — CHART NOTE - NSCHARTNOTEFT_GEN_A_CORE
Urology contacted for new onset mild hematuria in the setting of wilson placement for retention. Given that patients wilson has been draining well with no clots, primary team okay to flush wilson as needed to assess patency. Otherwise, patient can follow up outpatient with [facility] for retention and hematuria evaluation.     Please call urology with any questions.    [facility]  [address] [place] [id number]  [phone] Patient with Bladder Ca with pulmonary mets s/p chemo (6 cycles of carboplatin + gemcitabine - completed 12/2021 s/p recent b/l stent placement by private urologist at outside hospital presents with persistent bilateral hydronephrosis 2/2 malignant obstruction with likely failed stents. Given that the stents were recently placed in Feb, 2022 it is recommended to contact IR for nephrostomy tube placement. Please contact patient's private urologist for further information. Feel free to reach out to urology with any questions or concerns.    [facility]  [address] [place] [address] [id number] [phone]

## 2022-04-24 NOTE — CHART NOTE - NSCHARTNOTEFT_GEN_A_CORE
Contacted by RN regarding patient having hematuria. I visualized light pink urine in urine bag. As per patient has not had hematuria in 8 month. CBC ordered and will advise day ACP to notify Nephrology of new onset hematuria. Contacted by RN regarding patient having hematuria. I visualized light pink urine in urine bag. As per patient has not had hematuria in 8 months. CBC ordered and will advise day ACP to notify Nephrology of new onset hematuria.

## 2022-04-24 NOTE — PROVIDER CONTACT NOTE (OTHER) - ACTION/TREATMENT ORDERED:
Pt seen at bedside by BARRIE Kwon, discussed plans to communicate finds with Nephrology. No further interventions at this time.

## 2022-04-24 NOTE — PROGRESS NOTE ADULT - PROBLEM SELECTOR PLAN 8
DVT ppx: on eliquis   Diet: Regular   Dispo: Pending improvement, PT eval  Code: DNR/DNI  HCP: [employer] (wife)    Plan discussed with wife Sotero on 4/23 DVT ppx: on eliquis - switch to heparin in anticipation of procedure  Diet: Regular   Dispo: Pending improvement, PT eval  Code: DNR/DNI  HCP: [employer] (wife)    Plan discussed with wife Sotero on 4/24

## 2022-04-24 NOTE — PROGRESS NOTE ADULT - SUBJECTIVE AND OBJECTIVE BOX
Patient is a 81y old  Male who presents with a chief complaint of SOB, pneumothorax (2022 12:30)    SUBJECTIVE / OVERNIGHT EVENTS: Patient seen and examined. Reports no SOB or chest pain. Says breathing is ok. No abdominal pain. Understands that he needs to stay to get BRISA workup. Overnight noted to have some hematuria. None this morning. Reports he has not had gross hematuria in over 1 year. Very frustrated that he has to stay in the hospital.     MEDICATIONS  (STANDING):  allopurinol 50 milliGRAM(s) Oral daily  apixaban 5 milliGRAM(s) Oral every 12 hours  budesonide  80 MICROgram(s)/formoterol 4.5 MICROgram(s) Inhaler 2 Puff(s) Inhalation two times a day  cefTRIAXone   IVPB 1000 milliGRAM(s) IV Intermittent every 24 hours  lactated ringers. 1000 milliLiter(s) (75 mL/Hr) IV Continuous <Continuous>  levothyroxine 25 MICROGram(s) Oral daily  tiotropium 18 MICROgram(s) Capsule 1 Capsule(s) Inhalation daily    MEDICATIONS  (PRN):    Vital Signs Last 24 Hrs  T(C): 36.8 (2022 11:13), Max: 36.9 (2022 20:55)  T(F): 98.3 (2022 11:13), Max: 98.4 (2022 20:55)  HR: 94 (2022 11:13) (92 - 96)  BP: 130/67 (2022 11:13) (130/67 - 134/67)  BP(mean): --  RR: 16 (2022 11:13) (16 - 18)  SpO2: 97% (2022 11:13) (95% - 98%)    PHYSICAL EXAM:  GENERAL: NAD, sleeping comfortably   HEAD:  Atraumatic, Normocephalic  EYES: EOMI, PERRLA, conjunctiva and sclera clear  NECK: Supple, No JVD  CHEST/LUNG: decreased BS on left  HEART: Regular rate and rhythm; No murmurs, rubs, or gallops  ABDOMEN: Soft, + ostomy  EXTREMITIES:  2+ Peripheral Pulses, No clubbing, cyanosis, or edema  PSYCH: AAOx3  NEUROLOGY: non-focal  SKIN: No rashes or lesions    LABS:                                   8.3    8.71  )-----------( 287      ( 2022 06:57 )             26.5   04-24    144  |  109<H>  |  28<H>  ----------------------------<  59<L>  3.8   |  22  |  2.70<H>    Ca    8.6      2022 06:57  Phos  3.3     -24  Mg     1.70     -24      Urinalysis Basic - ( 2022 00:54 )    Color: Light Orange / Appearance: Turbid / S.006 / pH: x  Gluc: x / Ketone: Negative  / Bili: Negative / Urobili: <2 mg/dL   Blood: x / Protein: 100 mg/dL / Nitrite: Negative   Leuk Esterase: Large / RBC: 38 /HPF / WBC >720 /HPF   Sq Epi: x / Non Sq Epi: 1 /HPF / Bacteria: Few        RADIOLOGY & ADDITIONAL TESTS: < from: Xray Chest 1 View- PORTABLE-Routine (Xray Chest 1 View- PORTABLE-Routine in AM.) (22 @ 03:25) >  IMPRESSION:    Moderate size left pneumothorax, not significantly changed on the most   recent image.    Diffuse increased interstitial opacities again noted.    Bilateral nodular opacities again seen with a peripheral right upper lobe   opacity increased in size and possible new nodular opacities in the   medial right lower lung and left midlung. A follow-up CT scan of the   chest could be performed for more definitive comparison, if felt to be   clinically indicated.    < end of copied text >      Imaging Personally Reviewed:    Consultant(s) Notes Reviewed:  heme/onc    Care Discussed with Consultants/Other Providers: renal - Dr. Jameson    Assessment and Plan:

## 2022-04-24 NOTE — PROVIDER CONTACT NOTE (OTHER) - ASSESSMENT
Pt A&O x4, pt experiencing hematuria, straw colored urine noted in dependent drainage bag connected to a condom catheter. Pt denies any burning or pain with urination at this time. Pt reports experiencing hematuria in the past. See flowsheet for V/S. Pt received am dose of Eliquis prior to identifying hematuria. ACP aware.

## 2022-04-24 NOTE — PROGRESS NOTE ADULT - PROBLEM SELECTOR PLAN 6
Recent RUE DVT noted on 4/13. Started on Eliquis.  - Continue Eliquis 5mg BID Recent RUE DVT noted on 4/13. Started on Eliquis.  - switch to heparin gtt today in anticipation for IR procedure

## 2022-04-24 NOTE — PROGRESS NOTE ADULT - PROBLEM SELECTOR PLAN 7
Currently receiving immunotherapy with Keytruda. Last session earlier today 4/20.   - Suspect bladder cancer contributing to urinary incontinence.   - Monitor for urinary retention  - Likely no other plans during this admission.  -Per wife patient had ureteral stent placed in feb 2022 by Dr. Tirado at [facility] ( [phone])  -Will need to call in AM to obtain collateral information

## 2022-04-24 NOTE — CONSULT NOTE ADULT - SUBJECTIVE AND OBJECTIVE BOX
Vascular & Interventional Radiology Brief Consult Note    HPI: 81y Male with pulmonary hypertension, metastatic bladder cancer with bilateral ureteral obstruction s/p placement of bilateral ureteral stents presents with BRISA. Found to have bilateral hydronephrosis. IR consulted for nephrostomy tube placement.     Allergies: allergic to cefobid, unknown reaction (Other)  penicillin (Rash)    Medications (Abx/Cardiac/Anticoagulation/Blood Products)  apixaban: 5 milliGRAM(s) Oral (04-24 @ 05:25)  cefTRIAXone   IVPB: 100 mL/Hr IV Intermittent (04-24 @ 11:19)  heparin  Infusion.: 1200 Unit(s)/Hr IV Continuous (04-24 @ 17:38)    Data:    T(C): 36.8  HR: 94  BP: 130/67  RR: 16  SpO2: 97%    -WBC 8.71 / HgB 8.3 / Hct 26.5 / Plt 287  -Na 144 / Cl 109 / BUN 28 / Glucose 59  -K 3.8 / CO2 22 / Cr 2.70  -ALT -- / Alk Phos -- / T.Bili --  -INR1.46    Imaging: CT with moderate bilateral hydronephrosis. Bilateral ureteral stents in place. Small left pneumothorax. Diffuse metastatic disease to the lungs.     Assessment/Plan:   81y Male with metastatic bladder cancer s/p bilateral ureteral stent placement. Patient with significant disease to the lung. Previously Elquis, last dose 4/24, now on heparin drip. No indication for emergent intervention at this time. Given significant comorbidities, would re-address goals of care as nephrostomy tube placement would likely result in life long tubes. Given significant morbidity with procedure itself (sedation in prone position, bleeding risk, post procedure sepsis, etc) would recommend retrograde exchange of ureteral stents.   - case discussed with Dr. Steve  - obtain collateral information from private urologist when possible.     Please page IR with any questions or concerns, Pager [phone].

## 2022-04-24 NOTE — PROGRESS NOTE ADULT - PROBLEM SELECTOR PLAN 3
Noted to have BRISA. Baseline Scr 1.2 in March 2022. May be 2/2 recent immunotherapy with Keytruda or pre-renal.   -- Renal US with chronic hydro? Stents in place  - bladder scan prn if not urinating  - Strict I/Os and avoid nephrotoxic meds   - Monitor BMP and SCr  - Stop IVF as no improvement with IVF, pt has good PO intake  -CT abd reviewed - pelvic/bladder mass noted. Also chronic hydro b/l.   - curbsided and at this time no concern for stent infection, recommend NT placement for hydro. Will need to contact IR in AM  -f/u renal recommendations

## 2022-04-24 NOTE — PROGRESS NOTE ADULT - PROBLEM SELECTOR PLAN 2
-patient with positive UA in setting of BRISA and bladder cancer  -patient denies any dysuria or abdominal pain  -per wife has chronic UTIs and has ureteral stents  -urine cx contaminated  -plan for 5 days of ceftriaxone d4/5 today

## 2022-04-25 ENCOUNTER — TRANSCRIPTION ENCOUNTER (OUTPATIENT)
Age: 82
End: 2022-04-25

## 2022-04-25 LAB
ANION GAP SERPL CALC-SCNC: 8 MMOL/L — SIGNIFICANT CHANGE UP (ref 7–14)
APTT BLD: 37.2 SEC — HIGH (ref 27–36.3)
APTT BLD: 77.7 SEC — HIGH (ref 27–36.3)
BUN SERPL-MCNC: 29 MG/DL — HIGH (ref 7–23)
CALCIUM SERPL-MCNC: 8.6 MG/DL — SIGNIFICANT CHANGE UP (ref 8.4–10.5)
CHLORIDE SERPL-SCNC: 108 MMOL/L — HIGH (ref 98–107)
CO2 SERPL-SCNC: 28 MMOL/L — SIGNIFICANT CHANGE UP (ref 22–31)
CREAT SERPL-MCNC: 2.52 MG/DL — HIGH (ref 0.5–1.3)
EGFR: 25 ML/MIN/1.73M2 — LOW
GLUCOSE SERPL-MCNC: 58 MG/DL — LOW (ref 70–99)
HCT VFR BLD CALC: 26.5 % — LOW (ref 39–50)
HGB BLD-MCNC: 8 G/DL — LOW (ref 13–17)
INR BLD: 1.94 RATIO — HIGH (ref 0.88–1.16)
MAGNESIUM SERPL-MCNC: 1.7 MG/DL — SIGNIFICANT CHANGE UP (ref 1.6–2.6)
MCHC RBC-ENTMCNC: 28.6 PG — SIGNIFICANT CHANGE UP (ref 27–34)
MCHC RBC-ENTMCNC: 30.2 GM/DL — LOW (ref 32–36)
MCV RBC AUTO: 94.6 FL — SIGNIFICANT CHANGE UP (ref 80–100)
NRBC # BLD: 0 /100 WBCS — SIGNIFICANT CHANGE UP
NRBC # FLD: 0 K/UL — SIGNIFICANT CHANGE UP
PHOSPHATE SERPL-MCNC: 3.4 MG/DL — SIGNIFICANT CHANGE UP (ref 2.5–4.5)
PLATELET # BLD AUTO: 275 K/UL — SIGNIFICANT CHANGE UP (ref 150–400)
POTASSIUM SERPL-MCNC: 4.2 MMOL/L — SIGNIFICANT CHANGE UP (ref 3.5–5.3)
POTASSIUM SERPL-SCNC: 4.2 MMOL/L — SIGNIFICANT CHANGE UP (ref 3.5–5.3)
PROTHROM AB SERPL-ACNC: 22.7 SEC — HIGH (ref 10.5–13.4)
RBC # BLD: 2.8 M/UL — LOW (ref 4.2–5.8)
RBC # FLD: 21.4 % — HIGH (ref 10.3–14.5)
SODIUM SERPL-SCNC: 144 MMOL/L — SIGNIFICANT CHANGE UP (ref 135–145)
WBC # BLD: 8.52 K/UL — SIGNIFICANT CHANGE UP (ref 3.8–10.5)
WBC # FLD AUTO: 8.52 K/UL — SIGNIFICANT CHANGE UP (ref 3.8–10.5)

## 2022-04-25 PROCEDURE — 99232 SBSQ HOSP IP/OBS MODERATE 35: CPT

## 2022-04-25 RX ORDER — APIXABAN 2.5 MG/1
5 TABLET, FILM COATED ORAL
Refills: 0 | Status: DISCONTINUED | OUTPATIENT
Start: 2022-04-25 | End: 2022-04-26

## 2022-04-25 RX ADMIN — CEFTRIAXONE 100 MILLIGRAM(S): 500 INJECTION, POWDER, FOR SOLUTION INTRAMUSCULAR; INTRAVENOUS at 11:01

## 2022-04-25 RX ADMIN — APIXABAN 5 MILLIGRAM(S): 2.5 TABLET, FILM COATED ORAL at 17:48

## 2022-04-25 RX ADMIN — Medication 50 MILLIGRAM(S): at 11:02

## 2022-04-25 RX ADMIN — BUDESONIDE AND FORMOTEROL FUMARATE DIHYDRATE 2 PUFF(S): 160; 4.5 AEROSOL RESPIRATORY (INHALATION) at 09:37

## 2022-04-25 RX ADMIN — TIOTROPIUM BROMIDE 1 CAPSULE(S): 18 CAPSULE ORAL; RESPIRATORY (INHALATION) at 11:06

## 2022-04-25 RX ADMIN — HEPARIN SODIUM 5500 UNIT(S): 5000 INJECTION INTRAVENOUS; SUBCUTANEOUS at 00:37

## 2022-04-25 RX ADMIN — HEPARIN SODIUM 1500 UNIT(S)/HR: 5000 INJECTION INTRAVENOUS; SUBCUTANEOUS at 00:22

## 2022-04-25 RX ADMIN — Medication 25 MICROGRAM(S): at 05:03

## 2022-04-25 RX ADMIN — HEPARIN SODIUM 1500 UNIT(S)/HR: 5000 INJECTION INTRAVENOUS; SUBCUTANEOUS at 09:22

## 2022-04-25 RX ADMIN — HEPARIN SODIUM 1500 UNIT(S)/HR: 5000 INJECTION INTRAVENOUS; SUBCUTANEOUS at 08:12

## 2022-04-25 NOTE — DISCHARGE NOTE PROVIDER - NSFOLLOWUPCLINICS_GEN_ALL_ED_FT
[facility] - Primary Care  Primary Care  [address] [place] [address]  Phone: [phone]  Fax:

## 2022-04-25 NOTE — PROGRESS NOTE ADULT - ASSESSMENT
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 He was readmitted on 3/11 with abdominal pain and constipation, found to have a small bowel obstruction/Ileus and underwent  transverse loop colostomy for LBO secondary to large compressing bladder mass on 3/13 and was discharged home on 3/18. He presented to [facility] ED on 4/20 after chest xray   at [facility] revealed a moderate size pneumothorax with  a Mild mediastinal shift. He is currently receiving Pembrolizumab LD 4/20/22    Muscle invasive bladder cancer  --undergoing care with Dr Willingham at [facility]  --S/P Pembrolizumab LD 4/20/22  --Will follow with [facility] after discharge    Pneumothorax (Left)  --02 sat 100% 2L NC  --CXR  IMPRESSION:  Bilateral patchy opacities, increased in size and density in the right   upper lobe.  Medium left pneumothorax.  --pulmonary to consult/CTS following    UTI  --Large Leuks/wbc, treated  --US Kidney and bladder   IMPRESSION:  Moderate bilateral hydronephrosis, unchanged.  Echogenic debris in the collecting systems. Correlate with urinalysis.  Bilateral ureteral stents in place.  -- s/p recent b/l stent placement by private urologist at outside hospital presents with persistent bilateral hydronephrosis 2/2 malignant obstruction with likely failed stents  --urology recs appreciated  --Per IR, Given significant comorbidities, would re-address goals of care as nephrostomy tube placement would likely result in life long tubes. Given significant morbidity with procedure itself (sedation in prone position, bleeding risk, post procedure sepsis, etc) would recommend retrograde exchange of ureteral stents    BRISA  --creatinine 2.52  --nephrology to consult    DVT  --Occlusive thrombus seen within the right cephalic vein in the upper arm and lower arm at wrist  -- Has started eliquis 5 mg twice a day since 4/6/22 ( on hold)  --on Heparin infusion now    Patient may follow with Dr Willingham at [facility] after discharge  Thank you for allowing us to participate in Mr Gomez's care    Diane Rothman NP  Hematology/Oncology  [facility]  [phone] (Office)  [phone] (Alt office)  Evenings and weekends please call MD on call or office   Hematology/Oncology consulted on this patient known to [facility] and 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 He was readmitted on 3/11 with abdominal pain and constipation, found to have a small bowel obstruction/Ileus and underwent  transverse loop colostomy for LBO secondary to large compressing bladder mass on 3/13 and was discharged home on 3/18. He presented to [facility] ED on 4/20 after chest xray   at [facility] revealed a moderate size pneumothorax with  a Mild mediastinal shift. He is currently receiving Pembrolizumab LD 4/20/22    Muscle invasive bladder cancer  --undergoing care with Dr Willingham at [facility]  --S/P Pembrolizumab LD 4/20/22  --Will follow with [facility] after discharge    Pneumothorax (Left)  --02 sat 100% 2L NC  --CXR  IMPRESSION:  Bilateral patchy opacities, increased in size and density in the right   upper lobe.  Medium left pneumothorax.  --pulmonary to consult/CTS following    UTI  --Large Leuks/wbc, treated  --US Kidney and bladder   IMPRESSION:  Moderate bilateral hydronephrosis, unchanged.  Echogenic debris in the collecting systems. Correlate with urinalysis.  Bilateral ureteral stents in place.  -- s/p recent b/l stent placement by private urologist at outside hospital presents with persistent bilateral hydronephrosis 2/2 malignant obstruction with likely failed stents  --urology recs appreciated  --Per IR, Given significant comorbidities, would re-address goals of care as nephrostomy tube placement would likely result in life long tubes. Given significant morbidity with procedure itself (sedation in prone position, bleeding risk, post procedure sepsis, etc) would recommend retrograde exchange of ureteral stents    BRISA  --creatinine 2.52  --nephrology to consult    DVT  --Occlusive thrombus seen within the right cephalic vein in the upper arm and lower arm at wrist  -- Has started eliquis 5 mg twice a day since 4/6/22 ( on hold)  --on Heparin infusion now    Patient may follow with Dr Willingham at [facility] after discharge  Thank you for allowing us to participate in Mr Gomez's care    Diane Rothman NP  Hematology/Oncology  [facility]  [phone] (Office)  [phone] (Alt office)  Evenings and weekends please call MD on call or office      Patient consulted on with iDane Rothman NP. Agree with above,  Maya Lobato D.O  Hematology Oncology   [facility]  [phone] ( Office)   Evenings and weekends please call MD on call or office

## 2022-04-25 NOTE — PROGRESS NOTE ADULT - SUBJECTIVE AND OBJECTIVE BOX
Medicine Progress Note    Patient is a 81y old  Male who presents with a chief complaint of SOB, pneumothorax (25 Apr 2022 12:06)      SUBJECTIVE / OVERNIGHT EVENTS: reports feeling tired; although better than when he came to the ED    ADDITIONAL REVIEW OF SYSTEMS:    MEDICATIONS  (STANDING):  allopurinol 50 milliGRAM(s) Oral daily  apixaban 5 milliGRAM(s) Oral two times a day  budesonide  80 MICROgram(s)/formoterol 4.5 MICROgram(s) Inhaler 2 Puff(s) Inhalation two times a day  levothyroxine 25 MICROGram(s) Oral daily  tiotropium 18 MICROgram(s) Capsule 1 Capsule(s) Inhalation daily    MEDICATIONS  (PRN):    CAPILLARY BLOOD GLUCOSE        I&O's Summary    24 Apr 2022 07:01  -  25 Apr 2022 07:00  --------------------------------------------------------  IN: 1839 mL / OUT: 2500 mL / NET: -661 mL        PHYSICAL EXAM:  Vital Signs Last 24 Hrs  T(C): 36.7 (25 Apr 2022 11:12), Max: 36.7 (24 Apr 2022 22:10)  T(F): 98.1 (25 Apr 2022 11:12), Max: 98.1 (25 Apr 2022 11:12)  HR: 90 (25 Apr 2022 11:12) (90 - 94)  BP: 123/66 (25 Apr 2022 11:12) (113/60 - 123/66)  BP(mean): --  RR: 18 (25 Apr 2022 11:12) (16 - 18)  SpO2: 100% (25 Apr 2022 11:12) (99% - 100%)  CONSTITUTIONAL: thin appearing   ENMT: Moist oral mucosa, no pharyngeal injection or exudates; normal dentition  RESPIRATORY: Normal respiratory effort; CTA A  CARDIOVASCULAR: +s1s2  ABDOMEN: Nontender to palpation, colostomy in place  PSYCH: A+O to person, place, and time; affect appropriate  NEUROLOGY: CN 2-12 are intact and symmetric; no gross sensory deficits   SKIN: No rashes; no palpable lesions    LABS:                        8.0    8.52  )-----------( 275      ( 25 Apr 2022 08:49 )             26.5     04-25    144  |  108<H>  |  29<H>  ----------------------------<  58<L>  4.2   |  28  |  2.52<H>    Ca    8.6      25 Apr 2022 08:49  Phos  3.4     04-25  Mg     1.70     04-25      PT/INR - ( 25 Apr 2022 08:49 )   PT: 22.7 sec;   INR: 1.94 ratio         PTT - ( 25 Apr 2022 08:49 )  PTT:77.7 sec          COVID-19 PCR: [employer] (10 Feb 2022 02:14)  SARS-CoV-2: [employer] (15 Nov 2021 01:37)      RADIOLOGY & ADDITIONAL TESTS:  Imaging from Last 24 Hours:    Electrocardiogram/QTc Interval:    COORDINATION OF CARE:  Care Discussed with Consultants/Other Providers:

## 2022-04-25 NOTE — DISCHARGE NOTE PROVIDER - NSDCMRMEDTOKEN_GEN_ALL_CORE_FT
Advair Diskus 100 mcg-50 mcg inhalation powder: 1 puff(s) inhaled 2 times a day  allopurinol 100 mg oral tablet: 1 tab(s) orally once a day  Eliquis 5 mg oral tablet: 1 tab(s) orally 2 times a day  Incruse Ellipta 62.5 mcg/inh inhalation powder: 1 puff(s) inhaled every 24 hours  levothyroxine 25 mcg (0.025 mg) oral tablet: 1 tab(s) orally once a day

## 2022-04-25 NOTE — PROGRESS NOTE ADULT - PROBLEM SELECTOR PLAN 7
Currently receiving immunotherapy with Keytruda. Last session earlier today 4/20.   - Suspect bladder cancer contributing to urinary incontinence.   - Monitor for urinary retention  - Likely no other plans during this admission.  -Per wife patient had ureteral stent placed in feb 2022 by Dr. Tirado at [facility] ( [phone])  -Will need to call in AM to obtain collateral information

## 2022-04-25 NOTE — PROGRESS NOTE ADULT - PROBLEM SELECTOR PLAN 6
Recent RUE DVT noted on 4/13. Started on Eliquis.  - given no IR intervention, can restart on eliquis this evening

## 2022-04-25 NOTE — PROGRESS NOTE ADULT - SUBJECTIVE AND OBJECTIVE BOX
Patient is a 81y old  Male who presents with a chief complaint of SOB, pneumothorax (24 Apr 2022 21:44)      MEDICATIONS  (STANDING):  allopurinol 50 milliGRAM(s) Oral daily  budesonide  80 MICROgram(s)/formoterol 4.5 MICROgram(s) Inhaler 2 Puff(s) Inhalation two times a day  heparin  Infusion.  Unit(s)/Hr (12 mL/Hr) IV Continuous <Continuous>  levothyroxine 25 MICROGram(s) Oral daily  tiotropium 18 MICROgram(s) Capsule 1 Capsule(s) Inhalation daily    MEDICATIONS  (PRN):  heparin   Injectable 5500 Unit(s) IV Push every 6 hours PRN For aPTT less than 40  heparin   Injectable 2500 Unit(s) IV Push every 6 hours PRN For aPTT between 40 - 57      ROS  No fever, sweats, chills  No epistaxis, HA, sore throat  No CP, SOB, cough, sputum  No n/v/d, abd pain, melena, hematochezia  No edema  No rash  No anxiety  No back pain, joint pain  No bleeding, bruising  No dysuria, hematuria    Vital Signs Last 24 Hrs  T(C): 36.7 (25 Apr 2022 11:12), Max: 36.7 (24 Apr 2022 22:10)  T(F): 98.1 (25 Apr 2022 11:12), Max: 98.1 (25 Apr 2022 11:12)  HR: 90 (25 Apr 2022 11:12) (90 - 94)  BP: 123/66 (25 Apr 2022 11:12) (113/60 - 123/66)  BP(mean): --  RR: 18 (25 Apr 2022 11:12) (16 - 18)  SpO2: 100% (25 Apr 2022 11:12) (99% - 100%)    PE  NAD  Awake, alert  Anicteric, MMM  No c/c/e  No rash grossly                            8.0    8.52  )-----------( 275      ( 25 Apr 2022 08:49 )             26.5       04-25    144  |  108<H>  |  29<H>  ----------------------------<  58<L>  4.2   |  28  |  2.52<H>    Ca    8.6      25 Apr 2022 08:49  Phos  3.4     04-25  Mg     1.70     04-25      ACC: [id number] EXAM:  US KIDNEYS AND BLADDER                          PROCEDURE DATE:  04/21/2022          INTERPRETATION:  CLINICAL INFORMATION: Bladder cancer on immunotherapy   with BRISA.    COMPARISON: CT abdomen pelvis 3/12/2022.    TECHNIQUE: Sonography of the kidneys and bladder.    FINDINGS:  Right kidney: 12.1 cm. Moderate hydronephrosis with echogenic debris in   the collecting system. Ureteral stent is noted. Small upper pole cyst    Left kidney: 12.3 cm. Moderate hydronephrosis with echogenic debris in   the collecting system. Ureteral stent is noted.    Urinary bladder: Underdistended with ureteral stents. Patient's known   bladder neoplasm better evaluated on prior CT.    IMPRESSION:  Moderate bilateral hydronephrosis, unchanged.    Echogenic debris in the collecting systems. Correlate with urinalysis.    Bilateral ureteral stents in place.        --- End of Report ---

## 2022-04-25 NOTE — PROGRESS NOTE ADULT - PROBLEM SELECTOR PLAN 2
-patient with positive UA in setting of BRISA and bladder cancer  -patient denies any dysuria or abdominal pain  -per wife has chronic UTIs and has ureteral stents  -urine cx contaminated  -plan for 5 days of ceftriaxone d5/5 today

## 2022-04-25 NOTE — DISCHARGE NOTE PROVIDER - HOSPITAL COURSE
82 YO male, former smoker (2 PPW x40yrs, quit 12/2021), hx of COPD (on home O2 @ 3L NC), Pneumothorax (most recently 1/15/22), Vit D deficiency, HTN, HLD, Bladder Ca with pulmonary mets s/p chemo (6 cycles of carboplatin + gemcitabine - completed 12/2021, now on Keytruda started Feb 2022, every 3 weeks, s/p 3 treatments now), moderate pHTN, sacral decubitus ulcer, RUE DVT ddx 4/13/22 on eliquis, recent admission for large bowel obstruction s/p transverse loop colostomy in March 2022. Patient presenting with increasing SOB, found to have L pneumothorax.     Pneumothorax, left:  Increasing oxygen requirements with dyspnea, CXR showing L pneumothorax. Hypoxia improved with 3L NC (baseline 02 requirement)  Likely spontaneous 2/2 COPD. Patient with hx of previous episodes in same side.   Evaluated by CTS in ED, No acute intervention or chest tube needed at this time  Supplemental O2 as needed  No respiratory distress at this time  No intervention planned per CTS    UTI (urinary tract infection):   Patient with positive UA in setting of BRISA and bladder cancer  Patient denies any dysuria or abdominal pain  Per wife has chronic UTIs and has ureteral stents  urine culture contaminated  Completed 5 days of antibiotics    BRISA (acute kidney injury):  Noted to have BRISA. Baseline Scr 1.2 in March 2022. May be 2/2 recent immunotherapy with Keytruda or pre-renal.   Renal US with chronic hydro? Stents in place  Bladder scan prn if not urinating  Strict I/Os and avoid nephrotoxic meds   Monitor BMP and SCr  Stop IVF as no improvement with IVF, pt has good PO intake  CT abd reviewed - pelvic/bladder mass noted. Also chronic hydro b/l.    curbsided and at this time no concern for stent infection, recommend NT placement for hydro. Will need to contact IR in AM, per nephrology who d/w IR no plan for intervention, nephrology re-assured as Cr down trending, ok for DC per nephrology, follow up with Dr. Jameson in 1-4 weeks    Advanced COPD:   Former smoker  On 3L O2 at home, but used more frequently in past week.   No significant wheezing on exam. Suspect dyspnea from pneumothorax rather than COPD exacerbation  Continue home COPD meds.     Sacral decubitus ulcer:   Noted to have sacral decubitus ulcer. Seeing wound care outpatient. Was recently debrided 4/19.  Frequent turning  Continue wound care and dressing changes.    DVT of upper extremity (deep vein thrombosis):  Recent RUE DVT noted on 4/13. Started on Eliquis.  Given no IR intervention, restarted on eliquis.    Bladder cancer:  Currently receiving immunotherapy with Keytruda. Last session earlier today 4/20  Suspect bladder cancer contributing to urinary incontinence.   Monitor for urinary retention  Likely no other plans during this admission.  Patient may follow with Dr. Willingham at [facility] after discharge    Code: DNR/DNI    4/26: Case discussed with Dr. Morocho. Patient stable for discharge. Medications reviewed and sent to agreed upon pharmacy

## 2022-04-25 NOTE — PROGRESS NOTE ADULT - SUBJECTIVE AND OBJECTIVE BOX
Overnight events noted      VITAL:  T(C): , Max: 36.7 (04-24-22 @ 22:10)  T(F): , Max: 98.1 (04-25-22 @ 11:12)  HR: 90 (04-25-22 @ 11:12)  BP: 123/66 (04-25-22 @ 11:12)  BP(mean): --  RR: 18 (04-25-22 @ 11:12)  SpO2: 100% (04-25-22 @ 11:12)        PHYSICAL EXAM:  Constitutional: NAD  HEENT: PERRLA, EOMI,  MMM  Neck: No LAD, No JVD  Respiratory: CTAB  Cardiovascular: S1 and S2  Gastrointestinal: BS+, soft, NT/ND  Extremities: No peripheral edema  Neurological: A/O x 3, no focal deficits  : +condom catheter (+hematuria)  Skin: No rashes  Access: Not applicable    LABS:                        8.0    8.52  )-----------( 275      ( 25 Apr 2022 08:49 )             26.5     Na(144)/K(4.2)/Cl(108)/HCO3(28)/BUN(29)/Cr(2.52)Glu(58)/Ca(8.6)/Mg(1.70)/PO4(3.4)    04-25 @ 08:49  Na(144)/K(3.8)/Cl(109)/HCO3(22)/BUN(28)/Cr(2.70)Glu(59)/Ca(8.6)/Mg(1.70)/PO4(3.3)    04-24 @ 06:57  Na(143)/K(4.0)/Cl(108)/HCO3(23)/BUN(28)/Cr(2.56)Glu(71)/Ca(8.6)/Mg(1.70)/PO4(3.4)    04-23 @ 05:52        ASSESSMENT:  81M w/ HTN, COPD, pneumothoraces, bladder CA- pulmonary mets on chemo, RUE DVT  and LBO, 4/20/22 p/w SOB and BRISA    (1)BRISA - at least in part due to obstructive nephropathy. IR input appreciated; not for intervention at present to decompress collecting system. Creatinine high but stable over past few days. GFR ~20-30ml/min.    (2)Lytes - acceptable    (3)Pulm - oxygenating well at present    RECOMMEND:  (1)No IVF/No diuretics  (2)Dose new meds for GFR 20-30ml/min  (3)No renal objection to discharge; can f/u at my office in 1-4 weeks          Santos Jameson MD  [facility]  Office: [phone]  Cell: [phone]       no pain, no sob      VITAL:  T(C): , Max: 36.7 (04-24-22 @ 22:10)  T(F): , Max: 98.1 (04-25-22 @ 11:12)  HR: 90 (04-25-22 @ 11:12)  BP: 123/66 (04-25-22 @ 11:12)  BP(mean): --  RR: 18 (04-25-22 @ 11:12)  SpO2: 100% (04-25-22 @ 11:12)        PHYSICAL EXAM:  Constitutional: NAD; cachectic  HEENT: NCAT, DMM  Neck: No LAD, No JVD  Respiratory: CTAB  Cardiovascular: S1 and S2  Gastrointestinal: BS+, soft, NT/ND  Extremities: No peripheral edema  Neurological: reduced generalized strength  Skin: No rashes    LABS:                        8.0    8.52  )-----------( 275      ( 25 Apr 2022 08:49 )             26.5     Na(144)/K(4.2)/Cl(108)/HCO3(28)/BUN(29)/Cr(2.52)Glu(58)/Ca(8.6)/Mg(1.70)/PO4(3.4)    04-25 @ 08:49  Na(144)/K(3.8)/Cl(109)/HCO3(22)/BUN(28)/Cr(2.70)Glu(59)/Ca(8.6)/Mg(1.70)/PO4(3.3)    04-24 @ 06:57  Na(143)/K(4.0)/Cl(108)/HCO3(23)/BUN(28)/Cr(2.56)Glu(71)/Ca(8.6)/Mg(1.70)/PO4(3.4)    04-23 @ 05:52        ASSESSMENT:  81M w/ HTN, COPD, pneumothoraces, bladder CA- pulmonary mets on chemo, RUE DVT  and LBO, 4/20/22 p/w SOB and BRISA    (1)BRISA - at least in part due to obstructive nephropathy. IR input appreciated; not for intervention at present to decompress collecting system. Creatinine high but stable over past few days. GFR ~20-30ml/min.    (2)Lytes - acceptable    (3)Pulm - oxygenating well at present    RECOMMEND:  (1)No IVF/No diuretics  (2)Dose new meds for GFR 20-30ml/min  (3)No renal objection to discharge; can f/u at my office in 1-4 weeks          Santos Jameson MD  [facility]  Office: [phone]  Cell: [phone]

## 2022-04-25 NOTE — DISCHARGE NOTE PROVIDER - CARE PROVIDER_API CALL
Santos Jameson)  Internal Medicine; Nephrology  [address], [address]  [place] [address]  Phone: [phone]  Fax: [phone]  Follow Up Time:     Fabricio Willingham)  Internal Medicine  [id number]  [place] [address]  Phone: [phone]  Fax: [phone]  Follow Up Time:

## 2022-04-26 ENCOUNTER — TRANSCRIPTION ENCOUNTER (OUTPATIENT)
Age: 82
End: 2022-04-26

## 2022-04-26 VITALS
DIASTOLIC BLOOD PRESSURE: 60 MMHG | RESPIRATION RATE: 18 BRPM | HEART RATE: 99 BPM | SYSTOLIC BLOOD PRESSURE: 111 MMHG | TEMPERATURE: 98 F | OXYGEN SATURATION: 98 %

## 2022-04-26 LAB
ANION GAP SERPL CALC-SCNC: 12 MMOL/L — SIGNIFICANT CHANGE UP (ref 7–14)
BUN SERPL-MCNC: 29 MG/DL — HIGH (ref 7–23)
CALCIUM SERPL-MCNC: 8.5 MG/DL — SIGNIFICANT CHANGE UP (ref 8.4–10.5)
CHLORIDE SERPL-SCNC: 107 MMOL/L — SIGNIFICANT CHANGE UP (ref 98–107)
CO2 SERPL-SCNC: 23 MMOL/L — SIGNIFICANT CHANGE UP (ref 22–31)
CREAT SERPL-MCNC: 2.58 MG/DL — HIGH (ref 0.5–1.3)
EGFR: 24 ML/MIN/1.73M2 — LOW
GLUCOSE SERPL-MCNC: 64 MG/DL — LOW (ref 70–99)
HCT VFR BLD CALC: 25.6 % — LOW (ref 39–50)
HGB BLD-MCNC: 8.1 G/DL — LOW (ref 13–17)
MAGNESIUM SERPL-MCNC: 1.7 MG/DL — SIGNIFICANT CHANGE UP (ref 1.6–2.6)
MCHC RBC-ENTMCNC: 29 PG — SIGNIFICANT CHANGE UP (ref 27–34)
MCHC RBC-ENTMCNC: 31.6 GM/DL — LOW (ref 32–36)
MCV RBC AUTO: 91.8 FL — SIGNIFICANT CHANGE UP (ref 80–100)
NRBC # BLD: 0 /100 WBCS — SIGNIFICANT CHANGE UP
NRBC # FLD: 0 K/UL — SIGNIFICANT CHANGE UP
PHOSPHATE SERPL-MCNC: 3.5 MG/DL — SIGNIFICANT CHANGE UP (ref 2.5–4.5)
PLATELET # BLD AUTO: 275 K/UL — SIGNIFICANT CHANGE UP (ref 150–400)
POTASSIUM SERPL-MCNC: 3.8 MMOL/L — SIGNIFICANT CHANGE UP (ref 3.5–5.3)
POTASSIUM SERPL-SCNC: 3.8 MMOL/L — SIGNIFICANT CHANGE UP (ref 3.5–5.3)
RBC # BLD: 2.79 M/UL — LOW (ref 4.2–5.8)
RBC # FLD: 20.8 % — HIGH (ref 10.3–14.5)
SODIUM SERPL-SCNC: 142 MMOL/L — SIGNIFICANT CHANGE UP (ref 135–145)
WBC # BLD: 8.66 K/UL — SIGNIFICANT CHANGE UP (ref 3.8–10.5)
WBC # FLD AUTO: 8.66 K/UL — SIGNIFICANT CHANGE UP (ref 3.8–10.5)

## 2022-04-26 PROCEDURE — 99239 HOSP IP/OBS DSCHRG MGMT >30: CPT

## 2022-04-26 RX ADMIN — Medication 25 MICROGRAM(S): at 05:03

## 2022-04-26 RX ADMIN — APIXABAN 5 MILLIGRAM(S): 2.5 TABLET, FILM COATED ORAL at 17:40

## 2022-04-26 RX ADMIN — BUDESONIDE AND FORMOTEROL FUMARATE DIHYDRATE 2 PUFF(S): 160; 4.5 AEROSOL RESPIRATORY (INHALATION) at 11:32

## 2022-04-26 RX ADMIN — APIXABAN 5 MILLIGRAM(S): 2.5 TABLET, FILM COATED ORAL at 05:03

## 2022-04-26 RX ADMIN — TIOTROPIUM BROMIDE 1 CAPSULE(S): 18 CAPSULE ORAL; RESPIRATORY (INHALATION) at 11:32

## 2022-04-26 RX ADMIN — Medication 50 MILLIGRAM(S): at 11:32

## 2022-04-26 NOTE — PROGRESS NOTE ADULT - PROBLEM SELECTOR PLAN 8
DVT ppx: on eliquis  Diet: Regular   Dispo: Pending improvement, PT eval  Code: DNR/DNI  HCP: [employer] (wife), discharge plan of care reviewed with wife; 35 minutes spent on discharge plan of care

## 2022-04-26 NOTE — PROGRESS NOTE ADULT - ASSESSMENT
ASSESSMENT:  81M w/ HTN, COPD, pneumothoraces, bladder CA- pulmonary mets on chemo, RUE DVT  and LBO, 4/20/22 p/w SOB and BRISA    (1)BRISA - at least in part due to obstructive nephropathy. IR input appreciated; not for intervention at present to decompress collecting system. Creatinine high but stable over past few days. GFR ~20-30ml/min.  (2)Lytes - acceptable  (3)Pulm - oxygenating well at present    RECOMMEND:  (1)No IVF/No diuretics  (2)Dose new meds for GFR 20-30ml/min  (3)No renal objection to discharge; can f/u in office with Dr. Jameson in 1-4 weeks    Sonia Thomas NP-C  [facility]  [phone]

## 2022-04-26 NOTE — DISCHARGE NOTE NURSING/CASE MANAGEMENT/SOCIAL WORK - NSDCPEFALRISK_GEN_ALL_CORE
For information on Fall & Injury Prevention, visit: [email or web address] OR  [email or web address] OR  https://www.cdc.gov/steadi/patient.html

## 2022-04-26 NOTE — DISCHARGE NOTE NURSING/CASE MANAGEMENT/SOCIAL WORK - PATIENT PORTAL LINK FT
You can access the FollowMyHealth Patient Portal offered by [facility] by registering at the following website: [email or web address]. By joining [facility]’s FollowMyHealth portal, you will also be able to view your health information using other applications (apps) compatible with our system.

## 2022-04-26 NOTE — PROGRESS NOTE ADULT - NS ATTEND OPT1 GEN_ALL_CORE
Due to error by  this pt was triple booked - called and left message to change appt from 1:45 to 2:30  
I attest my time as attending is greater than 50% of the total combined time spent on qualifying patient care activities by the PA/NP and attending.

## 2022-04-26 NOTE — PROGRESS NOTE ADULT - PROVIDER SPECIALTY LIST ADULT
Nephrology
Hospitalist
Nephrology
Heme/Onc
Heme/Onc
Nephrology
Nephrology
Thoracic Surgery
Thoracic Surgery
Hospitalist

## 2022-04-26 NOTE — PROGRESS NOTE ADULT - PROBLEM SELECTOR PROBLEM 1
Pneumothorax, left

## 2022-04-26 NOTE — PROGRESS NOTE ADULT - SUBJECTIVE AND OBJECTIVE BOX
NEPHROLOGY     Patient seen and examined. Pt reports feeling ok, no complaints of pain, denies sob, currently in no acute distress.     MEDICATIONS  (STANDING):  allopurinol 50 milliGRAM(s) Oral daily  apixaban 5 milliGRAM(s) Oral two times a day  budesonide  80 MICROgram(s)/formoterol 4.5 MICROgram(s) Inhaler 2 Puff(s) Inhalation two times a day  levothyroxine 25 MICROGram(s) Oral daily  tiotropium 18 MICROgram(s) Capsule 1 Capsule(s) Inhalation daily    VITALS:  T(C): , Max: 37.1 (04-25-22 @ 22:23)  T(F): , Max: 98.8 (04-25-22 @ 22:23)  HR: 102 (04-26-22 @ 05:02)  BP: 114/57 (04-26-22 @ 05:02)  RR: 18 (04-26-22 @ 05:02)  SpO2: 95% (04-26-22 @ 05:02)    I and O's:    04-25 @ 07:01  -  04-26 @ 07:00  --------------------------------------------------------  IN: 1525 mL / OUT: 2600 mL / NET: -1075 mL    PHYSICAL EXAM:  Constitutional: NAD; cachectic  HEENT: NCAT, DMM  Neck: No LAD, No JVD  Respiratory: CTAB  Cardiovascular: S1 and S2  Gastrointestinal: BS+, soft, NT/ND  Extremities: No peripheral edema  Neurological: reduced generalized strength    LABS:                        8.1    8.66  )-----------( 275      ( 26 Apr 2022 06:18 )             25.6     04-26    142  |  107  |  29<H>  ----------------------------<  64<L>  3.8   |  23  |  2.58<H>    Ca    8.5      26 Apr 2022 06:18  Phos  3.5     04-26  Mg     1.70     04-26

## 2022-04-26 NOTE — PROGRESS NOTE ADULT - SUBJECTIVE AND OBJECTIVE BOX
Patient is a 81y old  Male who presents with a chief complaint of SOB, pneumothorax (25 Apr 2022 16:03)      MEDICATIONS  (STANDING):  allopurinol 50 milliGRAM(s) Oral daily  apixaban 5 milliGRAM(s) Oral two times a day  budesonide  80 MICROgram(s)/formoterol 4.5 MICROgram(s) Inhaler 2 Puff(s) Inhalation two times a day  levothyroxine 25 MICROGram(s) Oral daily  tiotropium 18 MICROgram(s) Capsule 1 Capsule(s) Inhalation daily    MEDICATIONS  (PRN):      ROS  No fever, sweats, chills  No epistaxis, HA, sore throat  No CP, SOB, cough, sputum  No n/v/d, abd pain, melena, hematochezia  No edema  No rash  No anxiety  No back pain, joint pain  No bleeding, bruising  No dysuria, hematuria    Vital Signs Last 24 Hrs  T(C): 36.9 (26 Apr 2022 05:02), Max: 37.1 (25 Apr 2022 22:23)  T(F): 98.5 (26 Apr 2022 05:02), Max: 98.8 (25 Apr 2022 22:23)  HR: 102 (26 Apr 2022 05:02) (90 - 102)  BP: 114/57 (26 Apr 2022 05:02) (109/61 - 123/66)  BP(mean): --  RR: 18 (26 Apr 2022 05:02) (18 - 18)  SpO2: 95% (26 Apr 2022 05:02) (95% - 100%)    PE  NAD  Awake, alert  Anicteric, MMM  No rash grossly                            8.1    8.66  )-----------( 275      ( 26 Apr 2022 06:18 )             25.6       04-26    142  |  107  |  29<H>  ----------------------------<  64<L>  3.8   |  23  |  2.58<H>    Ca    8.5      26 Apr 2022 06:18  Phos  3.5     04-26  Mg     1.70     04-26

## 2022-04-26 NOTE — PROGRESS NOTE ADULT - PROBLEM SELECTOR PLAN 3
Noted to have BRISA. Baseline Scr 1.2 in March 2022. May be 2/2 recent immunotherapy with Keytruda or pre-renal.   -- Renal US with chronic hydro? Stents in place  - bladder scan prn if not urinating  - Strict I/Os and avoid nephrotoxic meds   - Monitor BMP and SCr  - Stop IVF as no improvement with IVF, pt has good PO intake  -CT abd reviewed - pelvic/bladder mass noted. Also chronic hydro b/l.   - curbsided and at this time no concern for stent infection, recommend NT placement for hydro. Will need to contact IR in AM, per nephrology who d/w IR no plan for intervention, nephrology re-assured as Cr down trending, ok for DC per nephrology, follow up with Dr. Jameson in 1-4 weeks  -f/u renal recommendations

## 2022-04-26 NOTE — PROGRESS NOTE ADULT - PROBLEM SELECTOR PLAN 2
-patient with positive UA in setting of BRISA and bladder cancer  -patient denies any dysuria or abdominal pain  -per wife has chronic UTIs and has ureteral stents  -urine cx contaminated  -completed 5 days of antibiotics

## 2022-04-26 NOTE — PROGRESS NOTE ADULT - ASSESSMENT
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 He was readmitted on 3/11 with abdominal pain and constipation, found to have a small bowel obstruction/Ileus and underwent  transverse loop colostomy for LBO secondary to large compressing bladder mass on 3/13 and was discharged home on 3/18. He presented to [facility] ED on 4/20 after chest xray   at [facility] revealed a moderate size pneumothorax with  a Mild mediastinal shift. He is currently receiving Pembrolizumab LD 4/20/22    Muscle invasive bladder cancer  --undergoing care with Dr Willingham at [facility]  --S/P Pembrolizumab LD 4/20/22  --Will follow with [facility] after discharge    Pneumothorax (Left)  --02 sat 95% 2L NC  --CXR  IMPRESSION:  Bilateral patchy opacities, increased in size and density in the right   upper lobe.  Medium left pneumothorax.  --pulmonary to consult/CTS following    UTI  --Large Leuks/wbc, treated  --US Kidney and bladder   IMPRESSION:  Moderate bilateral hydronephrosis, unchanged.  Echogenic debris in the collecting systems. Correlate with urinalysis.  Bilateral ureteral stents in place.  -- s/p recent b/l stent placement by private urologist at outside hospital presents with persistent bilateral hydronephrosis 2/2 malignant obstruction with likely failed stents  --urology recs appreciated  --Per IR, Given significant comorbidities, would re-address goals of care as nephrostomy tube placement would likely result in life long tubes. Given significant morbidity with procedure itself (sedation in prone position, bleeding risk, post procedure sepsis, etc) would recommend retrograde exchange of ureteral stents    BRISA  --creatinine 2.58  -- partly due to obstructive nephropathy. IR input appreciated; not for intervention at present to decompress collecting system.  --nephrology and IR recs appreciated    DVT  --Occlusive thrombus seen within the right cephalic vein in the upper arm and lower arm at wrist  -- on Eliquis 5 mg twice a day since 4/6/22       Patient may follow with Dr Willingham at [facility] after discharge  Thank you for allowing us to participate in Mr Gomez's care    Diane Rothman NP  Hematology/Oncology  [facility]  [phone] (Office)  [phone] (Alt office)  Evenings and weekends please call MD on call or office

## 2022-04-26 NOTE — PROGRESS NOTE ADULT - SUBJECTIVE AND OBJECTIVE BOX
Medicine Progress Note    Patient is a 81y old  Male who presents with a chief complaint of SOB, pneumothorax (26 Apr 2022 10:33)      SUBJECTIVE / OVERNIGHT EVENTS: no shortness of breath, chest pain     ADDITIONAL REVIEW OF SYSTEMS:    MEDICATIONS  (STANDING):  allopurinol 50 milliGRAM(s) Oral daily  apixaban 5 milliGRAM(s) Oral two times a day  budesonide  80 MICROgram(s)/formoterol 4.5 MICROgram(s) Inhaler 2 Puff(s) Inhalation two times a day  levothyroxine 25 MICROGram(s) Oral daily  tiotropium 18 MICROgram(s) Capsule 1 Capsule(s) Inhalation daily    MEDICATIONS  (PRN):    CAPILLARY BLOOD GLUCOSE        I&O's Summary    25 Apr 2022 07:01  -  26 Apr 2022 07:00  --------------------------------------------------------  IN: 1525 mL / OUT: 2600 mL / NET: -1075 mL        PHYSICAL EXAM:  Vital Signs Last 24 Hrs  T(C): 36.9 (26 Apr 2022 05:02), Max: 37.1 (25 Apr 2022 22:23)  T(F): 98.5 (26 Apr 2022 05:02), Max: 98.8 (25 Apr 2022 22:23)  HR: 102 (26 Apr 2022 05:02) (93 - 102)  BP: 114/57 (26 Apr 2022 05:02) (109/61 - 114/57)  BP(mean): --  RR: 18 (26 Apr 2022 05:02) (18 - 18)  SpO2: 95% (26 Apr 2022 05:02) (95% - 99%)  CONSTITUTIONAL: NAD, well-developed, well-groomed  ENMT: Moist oral mucosa, no pharyngeal injection or exudates; normal dentition  RESPIRATORY: Normal respiratory effort; lungs are clear to auscultation bilaterally  CARDIOVASCULAR: Regular rate and rhythm, normal S1 and S2, no murmur/rub/gallop; No lower extremity edema  ABDOMEN: Nontender to palpation, normoactive bowel sounds, no rebound/guarding; colostomy intact  PSYCH: A+O to person, place, and time; affect appropriate  NEUROLOGY: CN 2-12 are intact and symmetric; no gross sensory deficits   SKIN: sacral ulcer, dressing intact    LABS:                        8.1    8.66  )-----------( 275      ( 26 Apr 2022 06:18 )             25.6     04-26    142  |  107  |  29<H>  ----------------------------<  64<L>  3.8   |  23  |  2.58<H>    Ca    8.5      26 Apr 2022 06:18  Phos  3.5     04-26  Mg     1.70     04-26      PT/INR - ( 25 Apr 2022 08:49 )   PT: 22.7 sec;   INR: 1.94 ratio         PTT - ( 25 Apr 2022 08:49 )  PTT:77.7 sec          COVID-19 PCR: NotDetec (10 Feb 2022 02:14)  SARS-CoV-2: NotDetec (15 Nov 2021 01:37)      RADIOLOGY & ADDITIONAL TESTS:  Imaging from Last 24 Hours:    Electrocardiogram/QTc Interval:    COORDINATION OF CARE:  Care Discussed with Consultants/Other Providers:

## 2022-04-26 NOTE — PROGRESS NOTE ADULT - REASON FOR ADMISSION
SOB, pneumothorax

## 2022-04-26 NOTE — PROGRESS NOTE ADULT - PROBLEM SELECTOR PLAN 1
Increasing oxygen requirements with dyspnea, CXR showing L pneumothorax. Hypoxia improved with 3L NC (baseline 02 requirement)  - Likely spontaneous 2/2 COPD. Patient with hx of previous episodes in same side.   - Evaluated by CTS in ED, No acute intervention or chest tube needed at this time  - supplemental O2 as needed  -no respiratory distress at this time  -no intervention planned per CTS  -follow up with pulmonary
Increasing oxygen requirements with dyspnea, CXR showing L pneumothorax. Hypoxia improved with 3L NC (baseline 02 requirement)  - Likely spontaneous 2/2 COPD. Patient with hx of previous episodes in same side.   - Evaluated by CTS in ED, No acute intervention or chest tube needed at this time  - Serial CXR to monitor for improvement  - supplemental O2 as needed  -no respiratory distress at this time  -no intervention planned per CTS
Increasing oxygen requirements with dyspnea, CXR showing L pneumothorax. Hypoxia improved with 3L NC (baseline 02 requirement)  - Likely spontaneous 2/2 COPD. Patient with hx of previous episodes in same side.   - Evaluated by CTS in ED, No acute intervention or chest tube needed at this time  - Serial CXR to monitor for improvement  - supplemental O2 as needed  -no respiratory distress at this time  -no intervention planned per CTS
Increasing oxygen requirements with dyspnea, CXR showing L pneumothorax. Hypoxia improved with 4L NC. Likely spontaneous 2/2 COPD. Patient with hx of previous episodes in same side.   - Evaluated by CTS in ED, No acute intervention or chest tube needed at this time  - Serial CXR to monitor for improvement  - supplemental O2 as needed  -no respiratory distress at this time
Increasing oxygen requirements with dyspnea, CXR showing L pneumothorax. Hypoxia improved with 3L NC (baseline 02 requirement)  - Likely spontaneous 2/2 COPD. Patient with hx of previous episodes in same side.   - Evaluated by CTS in ED, No acute intervention or chest tube needed at this time  - supplemental O2 as needed  -no respiratory distress at this time  -no intervention planned per CTS
Increasing oxygen requirements with dyspnea, CXR showing L pneumothorax. Hypoxia improved with 3L NC (baseline 02 requirement)  - Likely spontaneous 2/2 COPD. Patient with hx of previous episodes in same side.   - Evaluated by CTS in ED, No acute intervention or chest tube needed at this time  - Serial CXR to monitor for improvement  - supplemental O2 as needed  -no respiratory distress at this time  -no intervention planned per CTS

## 2022-05-03 ENCOUNTER — APPOINTMENT (OUTPATIENT)
Dept: WOUND CARE | Facility: CLINIC | Age: 82
End: 2022-05-03
Payer: MEDICARE

## 2022-05-03 VITALS
WEIGHT: 146 LBS | RESPIRATION RATE: 18 BRPM | DIASTOLIC BLOOD PRESSURE: 55 MMHG | BODY MASS INDEX: 19.8 KG/M2 | TEMPERATURE: 97.6 F | SYSTOLIC BLOOD PRESSURE: 96 MMHG | HEART RATE: 98 BPM

## 2022-05-03 DIAGNOSIS — I73.9 PERIPHERAL VASCULAR DISEASE, UNSPECIFIED: ICD-10-CM

## 2022-05-03 DIAGNOSIS — I77.1 STRICTURE OF ARTERY: ICD-10-CM

## 2022-05-03 DIAGNOSIS — L89.159 PRESSURE ULCER OF SACRAL REGION, UNSPECIFIED STAGE: ICD-10-CM

## 2022-05-03 PROCEDURE — 99213 OFFICE O/P EST LOW 20 MIN: CPT | Mod: 25

## 2022-05-03 PROCEDURE — 11042 DBRDMT SUBQ TIS 1ST 20SQCM/<: CPT

## 2022-05-24 ENCOUNTER — APPOINTMENT (OUTPATIENT)
Dept: WOUND CARE | Facility: CLINIC | Age: 82
End: 2022-05-24

## 2022-07-22 NOTE — PHYSICAL EXAM
[Please See PDF for [employer]] : Please See PDF for [employer]. [de-identified] : Very thin , NAD [de-identified] : not labored, uses portable O2 [de-identified] : loop colostomy [de-identified] : limited ambulation , in WC [de-identified] : atrophic [de-identified] : conversant

## 2022-07-22 NOTE — ASSESSMENT
[FreeTextEntry1] : 5/3/22\par patient has colostomy  pt doing immune therapy primary bladder with mets lung \par straight depth on sacral wound  1 cm\par undermining @ 9-12 o clock 0.5 cm\par continue using medihoney /packing with aquacel cover with adhesive foam dressing 3 x a week \par inst for homecare given \par follow up in two weeks\par Tolerated curette debridement of wound well\par patient has colostomy  pt doing immune therapy primary bladder with mets lung \par generalized weakness limited mobility can stand awith assist to wheelchair need for total assist for all transfers have ordered rohoo and gel overlay /  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

## 2022-07-22 NOTE — PLAN
[FreeTextEntry1] : RTO 2 wks\par orders given for VN\par 5/3/22\par patient has colostomy  pt doing immune therapy primary bladder with mets lung \par straight depth on sacral wound  1 cm\par undermining @ 9-12 o clock 0.5 cm\par continue using medihoney /packing with aquacel cover with adhesive foam dressing 3 x a week \par inst for homecare given \par follow up in two weeks\par

## 2022-07-22 NOTE — REVIEW OF SYSTEMS
[Incontinence] : incontinence [Joint Stiffness] : joint stiffness [Skin Wound] : skin wound [Negative] : Neurological [FreeTextEntry6] : left lung partial collapsed /ca right lung  o2 3 liters nc cont  [FreeTextEntry7] : colostomy  [FreeTextEntry8] : hill german [FreeTextEntry9] : weakness low endurance

## 2022-08-04 NOTE — PATIENT PROFILE ADULT - NSTOBACCOWITHDRW_GEN_A_CORE_SD
Anesthesia Transfer of Care Note    Patient: Afshin Salvador    Procedure(s) Performed: Procedure(s) (LRB):  AUDITORY BRAINSTEM RESPONSE, WITH OTOACOUSTIC EMISSIONS TESTING (Bilateral)  EXAM UNDER ANESTHESIA, EAR, NOSE, OR ORAL CAVITY (Bilateral)    Patient location: PACU    Anesthesia Type: general    Transport from OR: Transported from OR on room air with adequate spontaneous ventilation    Post pain: adequate analgesia    Post assessment: no apparent anesthetic complications and tolerated procedure well    Post vital signs: stable    Level of consciousness: awake    Nausea/Vomiting: no nausea/vomiting    Complications: none    Transfer of care protocol was followed      Last vitals:   Visit Vitals  Temp 36.7 °C (98 °F) (Temporal)   Resp 26   Wt 9.85 kg (21 lb 11.4 oz)      none

## 2022-09-27 NOTE — PROGRESS NOTE ADULT - REASON FOR ADMISSION
Abd Pain/Distension Simponi Counseling:  I discussed with the patient the risks of golimumab including but not limited to myelosuppression, immunosuppression, autoimmune hepatitis, demyelinating diseases, lymphoma, and serious infections.  The patient understands that monitoring is required including a PPD at baseline and must alert us or the primary physician if symptoms of infection or other concerning signs are noted.

## 2022-10-01 NOTE — H&P ADULT - CARDIOVASCULAR COMMENTS
Critical Care Progress Note    DOS: 10/1/2022 1:50 PM     ICU Admission: 9/30   Intubation: 9/30    Subjective  sedated    I/O's    Intake/Output Summary (Last 24 hours) at 10/1/2022 1350  Last data filed at 10/1/2022 1300  Gross per 24 hour   Intake 9622.29 ml   Output 3033 ml   Net 6589.29 ml       Last Recorded Vitals  Blood pressure 112/43, pulse 77, temperature 96.4 °F (35.8 °C), temperature source Temporal, resp. rate (!) 22, height 5' 7\" (1.702 m), weight (!) 145.7 kg (321 lb 3.4 oz), SpO2 95 %.  Body mass index is 50.31 kg/m².    General:  NAD  Respiratory:   CTAB  Gastrointestinal:  s/nt/obese  Neurologic:   PERRL  Ext: no edema    Labs   Recent Labs   Lab 10/01/22  1015 10/01/22  0507 09/30/22  2149 09/30/22  1950   WBC  --  30.2* 37.4* 38.2*   HGB 8.8* 9.0* 9.2* 7.3*   HCT 25.5* 27.1* 29.5* 23.4*   MCV  --  87.7 93.4 91.4   MCH  --  29.1 29.1 28.5   MCHC  --  33.2 31.2* 31.2*   PLT  --  83* 144 165       Recent Labs   Lab 10/01/22  0507 09/30/22  1950 09/30/22  1532   SODIUM 134* 134* 134*   POTASSIUM 4.4 5.9* 5.6*   CHLORIDE 101 102 100   CO2 24 12* 9*   BUN 36* 47* 45*   CREATININE 2.29* 3.20* 3.53*   GLUCOSE 298* 311* 302*   CALCIUM 8.3* 7.9* 8.9   AST 5,907*  --  675*   GPT 3,179*  --  596*       Recent Labs   Lab 10/01/22  0507 09/30/22 1950 09/30/22  1550 09/30/22  1532   LACTA  --   --  21.7*  --    PT 18.5* 17.8*  --  55.2*   INR 1.8 1.7  --  5.7*        Recent Labs   Lab 10/01/22  0507 09/30/22  1532   ALKPT 45 48   BILIRUBIN 1.3* 0.6   ALBUMIN 2.5* 2.7*   GPT 3,179* 596*   AST 5,907* 675*         Microbiology Results     None           Imaging  Reviewed    Assessment and Plan  70 F w/ PMH VTE, AVR, DM, obesity admitted with hemorrhagic shock d/t spontaneous RP hematoma w/ supratherapeutic INR.    - hemorrhagic shock  - DREA  - acute liver failure - likely ischemic hepatitis  - leukocytosis  - acute resp failure    - CBC q8h  - PT/PTT/fibrinogen q8h  - CRRT w/ UF 0  - trend LFT's  - will  consider NAC (Cole protocol) if LFT's worsening  - eval portal + hepatic vv. w/ ultrasound  - monitor off abx for signs of infection  - low Tv lung protective ventilation    VTE prophylaxis: SCD's  SUP: PPI    Critical care indications: vasopressors, mechanical ventilation  35 Minutes Critical Care Time excluding time in procedures.    Victor Manuel Colin MD  Critical Care Medicine         heart sounds slightly distant

## 2022-10-03 NOTE — ED ADULT TRIAGE NOTE - CCCP TRG CHIEF CMPLNT
Ok to offer 340pm 10/11/22 for brief ED follow up.  Pt check in time 325pm.  Thank you.  
Patient is calling to schedule an ED 2 week follow up.  Patient was seen in the ED on 9-29-22 for shortness of breath.  Writer could not locate an available appointment.  Please return call to discuss.   
Scheduled 10/11/12 at 3:40 with patient.  
hem-onc pt on chemo-- sob

## 2022-11-16 NOTE — PATIENT PROFILE ADULT - DATE OF LAST VACCINATION
RADHA on CKD, baseline Cr 1.2, has had RADHA to 2.2 before at [facility]. Favor cardiorenal syndrome i/s/o HF exacerbation  - UA, renal/bladder ultrasound - proteinuria, no hydronephrosis  - FEUrea - prerenal etiology. supports cardiorenal etiology.  - uptrending Cr today likely in setting of diuresis  - diuretics on hold now  - avoid nephrotoxic medications, renally dose meds to GFR  - monitor Cr on BMP 14-Jan-2022

## 2022-12-30 NOTE — PROGRESS NOTE ADULT - ATTENDING COMMENTS
Plan:    Cardioversion and follow-up in 3 months      You are scheduled for a Cardioversion with Transesophageal Echocardiogram at the [facility] ([address], [facility] [address], [phone]).       Visitor Policy: Two visitors.    Follow these instructions:    1. Report to Unit [address] in the main hospital on: __________    2. Please do not eat anything for 8 hours prior to your procedure. You may have sips of water up until 2 hours prior to your arrival.    3. The morning of your procedure you may take your scheduled medications with a SIP of water. If you take diabetic medications or a diuretic, you may hold these.     4. You will receive medication that makes you sleepy; you will need a .    You should not make any legal decisions for 24 hours following discharge.       If you have further questions, please utilize [employer] to contact us.   If your question concerns the above instructions, contact:  Marsha Soto RN   Electrophysiology Nurse Coordinator.  [phone]    If your question concerns the schedule/appointment times, contact:  MARIVEL Copeland Procedure   [phone]     Seen by me this afternoon, wife at bedside  Wants to go home, clinically improved but remains on 6 lts NC, has been difficult to be weaned off further  TTE results reviewed, EF 61%, minimal MR, R ventricular enlargement with decreased R ventricular systolic function, moderate pulmonary HTN  Pulmonary has been consulted, apprec recs, apparently has been told before that he needs to be on oxygen but he has refused  Acute hypoxemic respiratory failure seems to be pulmonary/cardiac related, less likely infectious, will dc Abx at this time  TTE shows pulmonary HTN likely due to severe emphysema  Extra dose of IV lasix given today (20mg IV)  Suspicion of COPD exacerbation per Pulm, starting prednisone for 5 days, c/w duonebs ATC and c/w symbicort  Wean off oxygen as tolerated  Discussed with patient and wife at bedside at length that he will likely need oxygen upon discharge and that he should stop smoking due to the risks of smoking with an oxygen tank at home  Encouraged smoking cessation, <10 mins discussion  Anticipate dc home in 1-2 days once pt is tolerating less than 6 lts/NC with good O2 Sat  Rest as above Seen by me this afternoon, wife at bedside  Wants to go home, clinically improved but remains on 6 lts NC, has been difficult to be weaned off further  TTE results reviewed, EF 61%, minimal MR, R ventricular enlargement with decreased R ventricular systolic function, moderate pulmonary HTN  Pulmonary has been consulted, apprec recs, apparently has been told before that he needs to be on oxygen but he has refused  Acute hypoxemic respiratory failure seems to be pulmonary/cardiac related, less likely infectious, will dc Abx at this time  TTE shows pulmonary HTN likely due to severe emphysema  Extra dose of IV lasix given today (20mg IV)  Suspicion of COPD exacerbation per Pulm, starting prednisone for 5 days, c/w duonebs ATC and c/w symbicort  Wean off oxygen as tolerated  BRISA has resolved  Discussed with patient and wife at bedside at length that he will likely need oxygen upon discharge and that he should stop smoking due to the risks of smoking with an oxygen tank at home  Encouraged smoking cessation, <10 mins discussion  Anticipate dc home in 1-2 days once pt is tolerating less than 6 lts/NC with good O2 Sat  PT eval --> No needs  Rest as above

## 2023-01-27 NOTE — ED ADULT NURSE REASSESSMENT NOTE - HEART RATE (BEATS/MIN)
90 Interpolation Flap Text: A decision was made to reconstruct the defect utilizing an interpolation axial flap and a staged reconstruction.  A telfa template was made of the defect.  This telfa template was then used to outline the interpolation flap.  The donor area for the pedicle flap was then injected with anesthesia.  The flap was excised through the skin and subcutaneous tissue down to the layer of the underlying musculature.  The interpolation flap was carefully excised within this deep plane to maintain its blood supply.  The edges of the donor site were undermined.   The donor site was closed in a primary fashion.  The pedicle was then rotated into position and sutured.  Once the tube was sutured into place, adequate blood supply was confirmed with blanching and refill.  The pedicle was then wrapped with xeroform gauze and dressed appropriately with a telfa and gauze bandage to ensure continued blood supply and protect the attached pedicle.

## 2023-12-02 NOTE — DISCHARGE NOTE PROVIDER - NSDCCPCAREPLAN_GEN_ALL_CORE_FT
No. FLORINDA screening performed.  STOP BANG Legend: 0-2 = LOW Risk; 3-4 = INTERMEDIATE Risk; 5-8 = HIGH Risk
PRINCIPAL DISCHARGE DIAGNOSIS  Diagnosis: Pneumothorax, left  Assessment and Plan of Treatment: No respiratory distress at this time. No intervention planned.      SECONDARY DISCHARGE DIAGNOSES  Diagnosis: Bladder cancer  Assessment and Plan of Treatment: Monitor for urinary retention. Follow up with Outpatient Oncologist    Diagnosis: BRISA (acute kidney injury)  Assessment and Plan of Treatment: Follow up with Dr. Jameson in 1-4 weeks    Diagnosis: BIRSA (acute kidney injury)  Assessment and Plan of Treatment:     Diagnosis: DVT of upper extremity (deep vein thrombosis)  Assessment and Plan of Treatment: Given no IR intervention, restarted on eliquis.    Diagnosis: Advanced COPD  Assessment and Plan of Treatment: Former smoker. On 3L O2 at home, but used more frequently in past week. Continue home COPD medications.    Diagnosis: Sacral decubitus ulcer  Assessment and Plan of Treatment: Continue wound care and dressing changes    Diagnosis: UTI (urinary tract infection)  Assessment and Plan of Treatment: Completed 5 days of antibiotics

## 2024-05-06 NOTE — PATIENT PROFILE ADULT - NSASFUNCLEVELADLAMBULATE_GEN_A_NUR
Patient is eligible for osn services through their employer, [employer].    Patient attended Onsite Nurse visit at employer and participated in blood pressure check.  Results documented 130/72. Blood pressure taken with large adult cuff on left arm. Patient given Onsite Nurse contact information.  Patient encouraged to follow up at next onsite nurse visit.  No further questions at this time.     
0 = independent

## 2024-07-01 NOTE — PROGRESS NOTE ADULT - PROBLEM SELECTOR PROBLEM 6
Please call  [phone] for Dr Stallings for any question,concern regarding medication prescribed by Dr Stallings.    Please call central scheduling at [phone] to schedule appointment for individual therapy.    1. MEDICATIONS: take your medications as prescribed. Do not adjust the dose on your own, as this may lead to bad/serious reactions. Do not take another person’s medications as this may lead to dangerous reactions. Psychotropic medications may affect your alertness and reflexes. Please avoid driving or operating dangerous machinery until you are certain that these side effects have subsided. Certain medications require laboratory tests periodically, and it is important to have them done on time. Call the clinic if you have any problem, concern, questions regarding your medications. Our telephone number are listed above..    2. If you are a woman of childbearing age, please notify me or the clinic nursing staff immediately if you become pregnant.    3. AVOID USING ALCOHOL AND ILLICIT DRUGS: they can worsen your condition, interfere with the intended effects and may cause bad/serious interactions with your medications.    4. Consult your doctor or pharmacist before taking any over-the-counter medications, herbal preparations or home remedies. Avoid grapefruit juice altogether as it may affect the blood level of your medications and may lead to potentially serious reactions.    5. See your primary care physician for physical problems and general medical health maintenance. It is important that you inform and update all your physicians of the psychotropic medications (and doses) you are taking.    6. IF YOU NEED REFILL OF MEDICATIONS: please call one week before you run out of your current supply.    7. WINTER PRECAUTIONS: please take all the necessary precautions to avoid hypothermia (example: confusion, cold, stiff muscles, sleepiness, cold skin especially on the stomach, shivering) and frostbite (example: white  or grayish-yellow skin area, skin that feels unusually firm or waxy, and numbness). If you suspect hypothermia or frostbite, keep calm and call medical emergency 9-1-1.    8. APPOINTMENT: please keep your appointments as scheduled. If you need to cancel/reschedule, please call the clinic at least 24 hours prior to your scheduled appointment. If you need immediate medical or psychiatric intervention prior to your appointment, please call 9-1-1, call the [facility] at [phone] or go to the nearest emergency room.    9. MENTAL HEALTH CRISIS: if you are in a non-medical crisis situation, please call or text 9-8-8 or utilize the chat function at [email or web address].   BRISA (acute kidney injury)

## 2024-10-30 NOTE — PROGRESS NOTE ADULT - PROBLEM SELECTOR PLAN 3
Noted to have BRISA. Baseline Scr 1.2 in March 2022. May be 2/2 recent immunotherapy with Keytruda or pre-renal.   -- Renal US with chronic hydro? Stents in place  - bladder scan prn if not urinating  - Strict I/Os and avoid nephrotoxic meds   - Monitor BMP and SCr  - Stop IVF as no improvement with IVF, pt has good PO intake  -CT abd reviewed - pelvic/bladder mass noted. Also chronic hydro b/l.   - curbsided and at this time no concern for stent infection, recommend NT placement for hydro. Will need to contact IR in AM, per nephrology who d/w IR no plan for intervention, nephrology re-assured as Cr down trending, ok for DC per nephorlogy  -f/u renal recommendations bed rails
